# Patient Record
Sex: FEMALE | Race: BLACK OR AFRICAN AMERICAN | NOT HISPANIC OR LATINO | Employment: FULL TIME | URBAN - METROPOLITAN AREA
[De-identification: names, ages, dates, MRNs, and addresses within clinical notes are randomized per-mention and may not be internally consistent; named-entity substitution may affect disease eponyms.]

---

## 2017-02-27 ENCOUNTER — HOSPITAL ENCOUNTER (EMERGENCY)
Facility: HOSPITAL | Age: 22
Discharge: HOME | End: 2017-02-27
Attending: EMERGENCY MEDICINE
Payer: MEDICAID

## 2017-02-27 VITALS
TEMPERATURE: 98 F | BODY MASS INDEX: 24.75 KG/M2 | DIASTOLIC BLOOD PRESSURE: 79 MMHG | SYSTOLIC BLOOD PRESSURE: 122 MMHG | RESPIRATION RATE: 18 BRPM | HEIGHT: 64 IN | WEIGHT: 145 LBS | HEART RATE: 71 BPM | OXYGEN SATURATION: 98 %

## 2017-02-27 DIAGNOSIS — N12 PYELONEPHRITIS: Primary | ICD-10-CM

## 2017-02-27 LAB
ANION GAP SERPL CALC-SCNC: 9 MMOL/L
B-HCG UR QL: NEGATIVE
BASOPHILS # BLD AUTO: 0.08 K/UL
BASOPHILS NFR BLD: 1.3 %
BILIRUB UR QL STRIP: NEGATIVE
BUN SERPL-MCNC: 10 MG/DL
CALCIUM SERPL-MCNC: 9.6 MG/DL
CHLORIDE SERPL-SCNC: 106 MMOL/L
CLARITY UR: ABNORMAL
CO2 SERPL-SCNC: 22 MMOL/L
COLOR UR: YELLOW
CREAT SERPL-MCNC: 0.8 MG/DL
CTP QC/QA: YES
DIFFERENTIAL METHOD: ABNORMAL
EOSINOPHIL # BLD AUTO: 0.2 K/UL
EOSINOPHIL NFR BLD: 3.5 %
ERYTHROCYTE [DISTWIDTH] IN BLOOD BY AUTOMATED COUNT: 12.5 %
EST. GFR  (AFRICAN AMERICAN): >60 ML/MIN/1.73 M^2
EST. GFR  (NON AFRICAN AMERICAN): >60 ML/MIN/1.73 M^2
GLUCOSE SERPL-MCNC: 89 MG/DL
GLUCOSE UR QL STRIP: NEGATIVE
GRAM STN SPEC: NORMAL
GRAM STN SPEC: NORMAL
HCT VFR BLD AUTO: 47.2 %
HGB BLD-MCNC: 16.2 G/DL
HGB UR QL STRIP: ABNORMAL
KETONES UR QL STRIP: NEGATIVE
LEUKOCYTE ESTERASE UR QL STRIP: NEGATIVE
LYMPHOCYTES # BLD AUTO: 1.8 K/UL
LYMPHOCYTES NFR BLD: 29.5 %
MCH RBC QN AUTO: 32.7 PG
MCHC RBC AUTO-ENTMCNC: 34.3 %
MCV RBC AUTO: 95 FL
MICROSCOPIC COMMENT: NORMAL
MONOCYTES # BLD AUTO: 0.6 K/UL
MONOCYTES NFR BLD: 9.2 %
NEUTROPHILS # BLD AUTO: 3.4 K/UL
NEUTROPHILS NFR BLD: 56.5 %
NITRITE UR QL STRIP: NEGATIVE
PH UR STRIP: 5 [PH] (ref 5–8)
PLATELET # BLD AUTO: 299 K/UL
PMV BLD AUTO: 10.1 FL
POTASSIUM SERPL-SCNC: 4 MMOL/L
PROT UR QL STRIP: NEGATIVE
RBC # BLD AUTO: 4.96 M/UL
RBC #/AREA URNS HPF: 4 /HPF (ref 0–4)
SODIUM SERPL-SCNC: 137 MMOL/L
SP GR UR STRIP: 1.02 (ref 1–1.03)
URN SPEC COLLECT METH UR: ABNORMAL
UROBILINOGEN UR STRIP-ACNC: NEGATIVE EU/DL
WBC # BLD AUTO: 6.01 K/UL

## 2017-02-27 PROCEDURE — 85025 COMPLETE CBC W/AUTO DIFF WBC: CPT

## 2017-02-27 PROCEDURE — 80048 BASIC METABOLIC PNL TOTAL CA: CPT

## 2017-02-27 PROCEDURE — 81000 URINALYSIS NONAUTO W/SCOPE: CPT

## 2017-02-27 PROCEDURE — 87086 URINE CULTURE/COLONY COUNT: CPT

## 2017-02-27 PROCEDURE — 99284 EMERGENCY DEPT VISIT MOD MDM: CPT | Mod: 25

## 2017-02-27 PROCEDURE — 96374 THER/PROPH/DIAG INJ IV PUSH: CPT

## 2017-02-27 PROCEDURE — 87205 SMEAR GRAM STAIN: CPT

## 2017-02-27 PROCEDURE — 63600175 PHARM REV CODE 636 W HCPCS: Performed by: EMERGENCY MEDICINE

## 2017-02-27 PROCEDURE — 81025 URINE PREGNANCY TEST: CPT | Performed by: EMERGENCY MEDICINE

## 2017-02-27 RX ORDER — ACETAMINOPHEN 500 MG
1000 TABLET ORAL 3 TIMES DAILY PRN
Qty: 30 TABLET | Refills: 0
Start: 2017-02-27 | End: 2020-08-05

## 2017-02-27 RX ORDER — ONDANSETRON 4 MG/1
4 TABLET, FILM COATED ORAL 3 TIMES DAILY PRN
Qty: 20 TABLET | Refills: 0 | Status: SHIPPED | OUTPATIENT
Start: 2017-02-27 | End: 2017-08-07

## 2017-02-27 RX ORDER — KETOROLAC TROMETHAMINE 30 MG/ML
10 INJECTION, SOLUTION INTRAMUSCULAR; INTRAVENOUS
Status: COMPLETED | OUTPATIENT
Start: 2017-02-27 | End: 2017-02-27

## 2017-02-27 RX ORDER — METRONIDAZOLE 500 MG/1
500 TABLET ORAL 3 TIMES DAILY
COMMUNITY
End: 2017-08-07

## 2017-02-27 RX ORDER — AMOXICILLIN AND CLAVULANATE POTASSIUM 875; 125 MG/1; MG/1
1 TABLET, FILM COATED ORAL EVERY 12 HOURS
Qty: 28 TABLET | Refills: 0 | Status: SHIPPED | OUTPATIENT
Start: 2017-02-27 | End: 2017-03-09

## 2017-02-27 RX ORDER — KETOROLAC TROMETHAMINE 10 MG/1
10 TABLET, FILM COATED ORAL EVERY 12 HOURS PRN
Qty: 6 TABLET | Refills: 0 | Status: SHIPPED | OUTPATIENT
Start: 2017-02-27 | End: 2017-03-02

## 2017-02-27 RX ADMIN — KETOROLAC TROMETHAMINE 10 MG: 30 INJECTION, SOLUTION INTRAMUSCULAR at 11:02

## 2017-02-27 NOTE — ED NOTES
Pt reports that she drove herself to the ER but she needs someone to assist her with providing urine sample. Explained that due to the high volume of patients being triaged right now I am unable to assist her with getting on/off the toilet but if she gets the urge to provide sample, she can alert me and I will get assistance. Pt verbalized understanding.

## 2017-02-27 NOTE — ED AVS SNAPSHOT
[facility]  [address] [address]               Heydi SANDOVAL Czaplinski   2017 10:07 AM   ED    Description:  Female : 1995   Department:  [facility]           Your Care was Coordinated By:     Provider Role From To    Antonio Armenta MD Attending Provider 17 1008 --      Reason for Visit     Flank Pain           Diagnoses this Visit        Comments    Pyelonephritis    -  Primary       ED Disposition     ED Disposition Condition Comment    Discharge             To Do List           Follow-up Information     Follow up with ROCÍO Castro MD.    Specialty:  Urology    Why:  Recurrent UTI    Contact information:    [address] [facility]  [address]  [place] [address]  [phone]          Follow up with [facility].    Specialty:  Emergency Medicine    Why:  As needed, If symptoms worsen    Contact information:    [address] [place] [id number] [phone]       These Medications        Disp Refills Start End    ondansetron (ZOFRAN) 4 MG tablet 20 tablet 0 2017     Take 1 tablet (4 mg total) by mouth 3 (three) times daily as needed for Nausea. - Oral    acetaminophen (TYLENOL) 500 MG tablet 30 tablet 0 2017     Take 2 tablets (1,000 mg total) by mouth 3 (three) times daily as needed for Pain. - Oral    amoxicillin-clavulanate 875-125mg (AUGMENTIN) 875-125 mg per tablet 28 tablet 0 2017 3/9/2017    Take 1 tablet by mouth every 12 (twelve) hours. - Oral    ketorolac (TORADOL) 10 mg tablet 6 tablet 0 2017 3/2/2017    Take 1 tablet (10 mg total) by mouth every 12 (twelve) hours as needed for Pain. - Oral      [facility]     [facility] -  Assistance  Registered nurses in the [facility] provide clinical advisement, health education, appointment booking, and other advisory services.  Call for this free service at [phone].              Medications           Message regarding Medications     Verify the changes and/or additions to your medication regime listed below are the same as discussed with your clinician today.  If any of these changes or additions are incorrect, please notify your healthcare provider.        START taking these NEW medications        Refills    ondansetron (ZOFRAN) 4 MG tablet 0    Sig: Take 1 tablet (4 mg total) by mouth 3 (three) times daily as needed for Nausea.    Class: Print    Route: Oral    acetaminophen (TYLENOL) 500 MG tablet 0    Sig: Take 2 tablets (1,000 mg total) by mouth 3 (three) times daily as needed for Pain.    Class: No Print    Route: Oral    amoxicillin-clavulanate 875-125mg (AUGMENTIN) 875-125 mg per tablet 0    Sig: Take 1 tablet by mouth every 12 (twelve) hours.    Class: Print    Route: Oral    ketorolac (TORADOL) 10 mg tablet 0    Sig: Take 1 tablet (10 mg total) by mouth every 12 (twelve) hours as needed for Pain.    Class: Print    Route: Oral      These medications were administered today        Dose Freq    ketorolac injection 10 mg 10 mg ED 1 Time    Sig: Inject 10 mg into the vein ED 1 Time.    Class: Normal    Route: Intravenous           Verify that the below list of medications is an accurate representation of the medications you are currently taking.  If none reported, the list may be blank. If incorrect, please contact your healthcare provider. Carry this list with you in case of emergency.           Current Medications     metronidazole (FLAGYL) 500 MG tablet Take 500 mg by mouth 3 (three) times daily.    acetaminophen (TYLENOL) 500 MG tablet Take 2 tablets (1,000 mg total) by mouth 3 (three) times daily as needed for Pain.    amoxicillin-clavulanate 875-125mg (AUGMENTIN) 875-125 mg per tablet Take 1 tablet by mouth every 12 (twelve) hours.    ketorolac (TORADOL) 10 mg tablet Take 1 tablet (10 mg total) by mouth every 12 (twelve) hours as needed for Pain.    ondansetron  "(ZOFRAN) 4 MG tablet Take 1 tablet (4 mg total) by mouth 3 (three) times daily as needed for Nausea.           Clinical Reference Information           Your Vitals Were     BP                   126/82 (BP Location: Right arm, Patient Position: Sitting, BP Method: Automatic)           Allergies as of 2/27/2017        Reactions    Ciprofloxacin     Patient stated that when taking this medication she burns and itches and upon getting it via IV she can see the redness travel up her arm.      Immunizations Administered on Date of Encounter - 2/27/2017     None      ED Micro, Lab, POCT     Start Ordered       Status Ordering Provider    02/27/17 1243 02/27/17 1243  Urine culture  STAT      Ordered     02/27/17 1013 02/27/17 1012  CBC auto differential  Once      Final result     02/27/17 1013 02/27/17 1012  Basic metabolic panel  STAT      Final result     02/27/17 1013 02/27/17 1012  Gram stain  STAT      Final result     02/27/17 1005 02/27/17 1004  POCT urine pregnancy  Once      Final result     02/27/17 1005 02/27/17 1004  Urinalysis  Once      Final result     02/27/17 1004 02/27/17 1004  Urinalysis Microscopic  Once      Final result       ED Imaging Orders     None        Discharge Instructions           Kidney Infection (Adult, Female)    An infection in one or both kidneys is called "pyelonephritis." It usually happens when bacteria (or rarely, viruses, fungi, or other disease-causing organisms) get into the kidney. The bacteria (or other disease-causing organisms) can enter the kidneys from the bladder or blood traveling from other parts of the body. A kidney infection can become serious. It can cause severe illness, scarring of the kidneys, or kidney failure if not treated properly.  Common causes for this problem include:  · Not keeping the genital area clean and dry, which promotes the growth of bacteria  · Wiping back to front which drags bacteria from the rectum toward the urinary opening " (urethra)  · Wearing tight pants or underwear (this lets moisture build up in the genital area, which helps bacteria grow)  · Holding urine in for long periods of time  · Dehydration  Kidney infections can cause symptoms similar to a bladder infection. Symptoms include:  · Pain (or burning) when urinating  · Having to urinate more often than usual  · Blood in the urine (pink or red)  · Abdominal pain or discomfort, usually in the lower abdomen  · Pain in the side or back  · Pain above the pubic bone  · Fever or chills  · Vomiting  · Loss of appetite  Treatment is oral antibiotics, or in more severe cases, intramuscular or IV antibiotics. These are started right away and may be changed once urine culture results determine the infecting organisms. Treatment helps prevent a more serious kidney infection.  Medicines  Medicines can help in the treatment of a bladder infection:  · Take antibiotics until they are used up, even if you feel better. It is important to finish them to make sure the infection is gone.  · Unless another medicine was prescribed, you can use over-the-counter medicines for pain, fever, or discomfort. If you have chronic liver of kidney disease, talk with your healthcare provider before using these medicines. Also talk with your provider if you've ever had a stomach ulcer or gastrointestinal (GI) bleeding, or are taking blood thinners.  Home care  The following are general care guidelines:  · Stay home from work or school. Rest in bed until your fever breaks and you are feeling better, or as advised by your healthcare provider.  · Drink lots of fluid unless you must restrict fluids for other medical reasons. This will force the medicine into your urinary system and flush the bacteria out of your body. Ask your healthcare provider how much you should drink.  · Avoid sex until you have finished all of your medicine and your symptoms are gone.  · Avoid caffeine, alcohol, and spicy foods. These foods may  irritate the kidneys and bladder.  · Avoid taking bubble baths. Sensitivity to the chemicals in bubble baths can irritate the urethra.  · Make sure you wipe from front to back after using the toilet.  · Wear loose cloths and cotton underwear.  Prevention  These self-care steps can help prevent future infections:  · Drink plenty of fluids to prevent dehydration and flush out the bladder. Do this unless you must restrict fluids for other health reasons, or your healthcare provider told you not to.  · Proper cleaning after going to the bathroom in important. Make sure you wipe from front to back after using the toilet.  · Urinate more often. Don't try to hold urine in for a long time.  · Avoid tight-fitting pants and underwear.  · Improve your diet to prevent constipation. Eat more fruits, vegetables, and fiber. Eat less junk and fatty foods. Constipation can make a urinary tract infection more likely. Talk with your healthcare provider if you have trouble with bowel movements.  · Urinate right after intercourse to flush out the bladder.  Follow-up care  Follow up with your healthcare provider, or as advised. Additional testing may be needed to make sure the infection has cleared. Close follow-up and further testing is very important to find the cause and to prevent future infections.  If a urine culture was done, you will be contacted if your treatment needs to be changed. If directed, you may call to find out the results.  If you had an X-ray, CT scan, or other diagnostic test, you will be notified of any new findings that may affect your care.  Call 911  Call 911 if any of the following occur:  · Trouble breathing  · Fainting or loss of consciousness  · Rapid or very slow heart rate  · Weakness, dizziness, or fainting  · Difficulty arousing or confusion  When to seek medical advice  Call your healthcare provider right away if any of these occur:  · Fever 100.4°F (38°C) or higher after 48 hours of treatment, or as  advised by your healthcare provider  · Not feeling better within 1 to 2 days after starting antibiotics  · Any symptom that continues after 3 days of treatment  · Increasing pain in the stomach, back, side, or groin area  · Repeated vomiting  · Not able to take prescribed medicine due to nausea or another reason  · Bloody, dark-colored, or foul smelling urine  · Trouble urinating or decreased urine output  · No urine for 8 hours, no tears when crying, sunken eyes, or dry mouth  Date Last Reviewed: 10/1/2016  © 1296-5164 [employer]. [address] [place] [address]. All rights reserved. This information is not intended as a substitute for professional medical care. Always follow your healthcare professional's instructions.          [facility] Sign-Up     Activating your [facility] account is as easy as 1-2-3!     1) Visit [email or web address], select Sign Up Now, enter this activation code and your date of birth, then select Next.  [email or web address]  Expires: 4/13/2017 12:44 PM      2) Create a username and password to use when you visit [facility] in the future and select a security question in case you lose your password and select Next.    3) Enter your e-mail address and click Sign Up!    Additional Information  If you have questions, please e-mail [email or web address] or call [phone] to talk to our [facility] staff. Remember, [facility] is NOT to be used for urgent needs. For medical emergencies, dial 911.         Smoking Cessation     If you would like to quit smoking:   You may be eligible for free services if you are a [place] resident and started smoking cigarettes before September 1, 1988.  Call the [employer]oking [employer]ess[employer] ([employer]) toll free at (285) 116-3934 or (293) 052-1134.   Call 6-331-QUIT-NOW if you do not meet the above criteria.             [facility] complies with applicable Federal civil rights laws and does not discriminate on the basis of race, color,  national origin, age, disability, or sex.        Language Assistance Services     ATTENTION: Language assistance services are available, free of charge. Please call [phone].      ATENCIÓN: Si habla kurtis, tiene a pitt disposición servicios gratuitos de asistencia lingüística. Llame al [phone].     CHÚ Ý: N?u b?n nói Ti?ng Vi?t, có các d?ch v? h? tr? ngôn ng? mi?n phí dành cho b?n. G?i s? [phone].

## 2017-02-27 NOTE — ED PROVIDER NOTES
Encounter Date: 2/27/2017       History     Chief Complaint   Patient presents with    Flank Pain     left side X 5 days     Review of patient's allergies indicates:   Allergen Reactions    Ciprofloxacin      Patient stated that when taking this medication she burns and itches and upon getting it via IV she can see the redness travel up her arm.     HPI Comments: Pt presents with bilateral flank pain. Pain is sharp. Left worse than right. Left sided pain started 5 days ago.  Right-sided pain started yesterday.  States this is similar presentation when she was diagnosed with a recently passed kidney stone and a urinary tract infection.  She denies any fever or chills or vomiting.    The history is provided by the patient.     Past Medical History:   Diagnosis Date    Gastroenteritis     Kidney stone     Ovarian cyst 10/21/2013    PID (pelvic inflammatory disease) 10/21/2013     Past Surgical History:   Procedure Laterality Date    NONE  10/21/2013     Family History   Problem Relation Age of Onset    Diabetes Neg Hx     Hypertension Neg Hx     Stroke Neg Hx     Heart disease Neg Hx      Social History   Substance Use Topics    Smoking status: Current Every Day Smoker     Packs/day: 0.50     Types: Cigarettes    Smokeless tobacco: None    Alcohol use Yes     Review of Systems   Constitutional: Negative for fever.   HENT: Negative for sore throat.    Respiratory: Negative for shortness of breath.    Cardiovascular: Negative for chest pain.   Gastrointestinal: Negative for nausea.   Genitourinary: Positive for flank pain, frequency, hematuria and urgency. Negative for decreased urine volume, dysuria, menstrual problem, pelvic pain, vaginal bleeding and vaginal discharge.   Musculoskeletal: Negative for back pain.   Skin: Negative for rash.   Neurological: Negative for weakness.   Hematological: Does not bruise/bleed easily.       Physical Exam   Initial Vitals   BP Pulse Resp Temp SpO2   02/27/17 1002  02/27/17 1002 02/27/17 1002 02/27/17 1002 02/27/17 1002   118/81 87 17 98.2 °F (36.8 °C) 97 %     Physical Exam    Vitals reviewed.  Constitutional: She appears well-developed and well-nourished.   HENT:   Head: Normocephalic and atraumatic.   Nose: Nose normal.   Mouth/Throat: No oropharyngeal exudate.   Eyes: EOM are normal. Pupils are equal, round, and reactive to light.   Neck: Normal range of motion. Neck supple. No JVD present.   Cardiovascular: Regular rhythm and normal heart sounds. Exam reveals no gallop and no friction rub.    No murmur heard.  Pulmonary/Chest: Breath sounds normal. No stridor. No respiratory distress. She has no wheezes. She has no rhonchi. She has no rales. She exhibits no tenderness.   Abdominal: Soft. Bowel sounds are normal. She exhibits no distension and no mass. There is no tenderness. There is CVA tenderness (Tenderness to percussion of the left CVA.  Right-sided does not elicit pain response). There is no rebound and no guarding.   Musculoskeletal: Normal range of motion. She exhibits no edema or tenderness.   Neurological: She is alert and oriented to person, place, and time. She has normal strength. No sensory deficit.   Skin: Skin is warm and dry.   Psychiatric: She has a normal mood and affect. Thought content normal.         ED Course   Procedures  Labs Reviewed   URINALYSIS - Abnormal; Notable for the following:        Result Value    Appearance, UA Hazy (*)     Occult Blood UA 1+ (*)     All other components within normal limits   CBC W/ AUTO DIFFERENTIAL - Abnormal; Notable for the following:     Hemoglobin 16.2 (*)     MCH 32.7 (*)     All other components within normal limits   BASIC METABOLIC PANEL - Abnormal; Notable for the following:     CO2 22 (*)     All other components within normal limits   GRAM STAIN   CULTURE, URINE   URINALYSIS MICROSCOPIC   POCT URINE PREGNANCY             Medical Decision Making:   History:   Old Medical Records: I decided to obtain old  medical records.  Old Records Summarized: records from previous admission(s).       <> Summary of Records: December 4, 2016 the patient was diagnosed with pyelonephritis with possibility of recently passed ureteral stone on CT scan.  Differential Diagnosis:   UTI, pyelonephritis, urosepsis, ureteral stones  Clinical Tests:   Lab Tests: Reviewed and Ordered  Medical decision-making:    The patient received a medical screening exam. If performed, the EKG was independently evaluated by me and is pending final cardiology evaluation.  If performed, all radiographic studies were independently evaluated by me and are pending final radiology evaluation. If labs were ordered, they were reviewed. Vital signs are independently assessed by me.  If performed, the pulse oximetry was independently evaluated by me.  I decided to obtain the patient's past medical record.  If available, I reviewed the patient's past medical record, including most recent labs and radiology reports.    Vital sign review shows the patient is afebrile with a normal pulse and respiratory rate.  Patient's blood pressure is within a normal range.  Patient's pulse ox shows that the patient is oxygenating appropriately and is above 95% on Room Air.     Pt with flank pain and urinary symptoms. UA shows no infection, but gram stain is positive for GNR. Last UC showed a susceptibility to Augmentin.  Will recent urine culture and start Augmentin.  Patient is afebrile and without leukocytosis.  I do not think she is septic.  Will treat the patient for pyelonephritis.  Supportive care and referral to urology for recurrent urinary tract infections.    The results and physical exam findings were reviewed with the patient. Pt agrees with assessment, disposition and treatment plan and has no further questions or complaints at this time.    YANI Armenta M.D. 12:48 PM 2/27/2017                     ED Course     Clinical Impression:   The encounter diagnosis was  Pyelonephritis.          Antonio Armenta MD  02/27/17 1665

## 2017-03-01 LAB — BACTERIA UR CULT: NORMAL

## 2017-08-07 ENCOUNTER — HOSPITAL ENCOUNTER (EMERGENCY)
Facility: HOSPITAL | Age: 22
Discharge: HOME | End: 2017-08-07
Payer: MEDICAID

## 2017-08-07 VITALS
WEIGHT: 148 LBS | OXYGEN SATURATION: 96 % | HEART RATE: 124 BPM | TEMPERATURE: 99 F | DIASTOLIC BLOOD PRESSURE: 81 MMHG | SYSTOLIC BLOOD PRESSURE: 142 MMHG | HEIGHT: 64 IN | BODY MASS INDEX: 25.27 KG/M2 | RESPIRATION RATE: 20 BRPM

## 2017-08-07 DIAGNOSIS — R50.9 FEVER: ICD-10-CM

## 2017-08-07 DIAGNOSIS — J02.9 PHARYNGITIS, UNSPECIFIED ETIOLOGY: Primary | ICD-10-CM

## 2017-08-07 LAB
ALBUMIN SERPL BCP-MCNC: 3.9 G/DL
ALP SERPL-CCNC: 90 U/L
ALT SERPL W/O P-5'-P-CCNC: 11 U/L
ANION GAP SERPL CALC-SCNC: 7 MMOL/L
AST SERPL-CCNC: 16 U/L
B-HCG UR QL: NEGATIVE
BACTERIA #/AREA URNS HPF: ABNORMAL /HPF
BASOPHILS # BLD AUTO: 0.04 K/UL
BASOPHILS NFR BLD: 0.3 %
BILIRUB SERPL-MCNC: 0.6 MG/DL
BILIRUB UR QL STRIP: NEGATIVE
BUN SERPL-MCNC: 7 MG/DL
CALCIUM SERPL-MCNC: 9.6 MG/DL
CHLORIDE SERPL-SCNC: 105 MMOL/L
CLARITY UR: CLEAR
CO2 SERPL-SCNC: 25 MMOL/L
COLOR UR: ABNORMAL
CREAT SERPL-MCNC: 0.8 MG/DL
CTP QC/QA: YES
DEPRECATED S PYO AG THROAT QL EIA: NEGATIVE
DIFFERENTIAL METHOD: ABNORMAL
EOSINOPHIL # BLD AUTO: 0.1 K/UL
EOSINOPHIL NFR BLD: 0.4 %
ERYTHROCYTE [DISTWIDTH] IN BLOOD BY AUTOMATED COUNT: 11.7 %
EST. GFR  (AFRICAN AMERICAN): >60 ML/MIN/1.73 M^2
EST. GFR  (NON AFRICAN AMERICAN): >60 ML/MIN/1.73 M^2
GLUCOSE SERPL-MCNC: 92 MG/DL
GLUCOSE UR QL STRIP: NEGATIVE
HCT VFR BLD AUTO: 47.1 %
HGB BLD-MCNC: 15.8 G/DL
HGB UR QL STRIP: ABNORMAL
KETONES UR QL STRIP: NEGATIVE
LEUKOCYTE ESTERASE UR QL STRIP: NEGATIVE
LYMPHOCYTES # BLD AUTO: 0.7 K/UL
LYMPHOCYTES NFR BLD: 5.1 %
MCH RBC QN AUTO: 32.3 PG
MCHC RBC AUTO-ENTMCNC: 33.5 G/DL
MCV RBC AUTO: 96 FL
MICROSCOPIC COMMENT: ABNORMAL
MONOCYTES # BLD AUTO: 1 K/UL
MONOCYTES NFR BLD: 6.9 %
NEUTROPHILS # BLD AUTO: 12.8 K/UL
NEUTROPHILS NFR BLD: 87.1 %
NITRITE UR QL STRIP: NEGATIVE
PH UR STRIP: 8 [PH] (ref 5–8)
PLATELET # BLD AUTO: 244 K/UL
PMV BLD AUTO: 9.9 FL
POTASSIUM SERPL-SCNC: 4.2 MMOL/L
PROT SERPL-MCNC: 7.9 G/DL
PROT UR QL STRIP: NEGATIVE
RBC # BLD AUTO: 4.89 M/UL
RBC #/AREA URNS HPF: 10 /HPF (ref 0–4)
SODIUM SERPL-SCNC: 137 MMOL/L
SP GR UR STRIP: 1.01 (ref 1–1.03)
SQUAMOUS #/AREA URNS HPF: 3 /HPF
URN SPEC COLLECT METH UR: ABNORMAL
UROBILINOGEN UR STRIP-ACNC: NEGATIVE EU/DL
WBC # BLD AUTO: 14.65 K/UL
WBC #/AREA URNS HPF: 3 /HPF (ref 0–5)

## 2017-08-07 PROCEDURE — 85025 COMPLETE CBC W/AUTO DIFF WBC: CPT

## 2017-08-07 PROCEDURE — 99284 EMERGENCY DEPT VISIT MOD MDM: CPT | Mod: 25

## 2017-08-07 PROCEDURE — 96360 HYDRATION IV INFUSION INIT: CPT

## 2017-08-07 PROCEDURE — 87040 BLOOD CULTURE FOR BACTERIA: CPT | Mod: 59

## 2017-08-07 PROCEDURE — 25000003 PHARM REV CODE 250: Performed by: NURSE PRACTITIONER

## 2017-08-07 PROCEDURE — 80053 COMPREHEN METABOLIC PANEL: CPT

## 2017-08-07 PROCEDURE — 87081 CULTURE SCREEN ONLY: CPT

## 2017-08-07 PROCEDURE — 81025 URINE PREGNANCY TEST: CPT

## 2017-08-07 PROCEDURE — 81000 URINALYSIS NONAUTO W/SCOPE: CPT

## 2017-08-07 PROCEDURE — 63600175 PHARM REV CODE 636 W HCPCS: Performed by: NURSE PRACTITIONER

## 2017-08-07 PROCEDURE — 87880 STREP A ASSAY W/OPTIC: CPT

## 2017-08-07 PROCEDURE — 96372 THER/PROPH/DIAG INJ SC/IM: CPT

## 2017-08-07 RX ORDER — IBUPROFEN 200 MG
200 TABLET ORAL EVERY 6 HOURS PRN
Status: ON HOLD | COMMUNITY
End: 2018-05-09 | Stop reason: HOSPADM

## 2017-08-07 RX ORDER — ACETAMINOPHEN 325 MG/1
650 TABLET ORAL
Status: COMPLETED | OUTPATIENT
Start: 2017-08-07 | End: 2017-08-07

## 2017-08-07 RX ADMIN — SODIUM CHLORIDE 1000 ML: 0.9 INJECTION, SOLUTION INTRAVENOUS at 11:08

## 2017-08-07 RX ADMIN — PENICILLIN G BENZATHINE 1.2 MILLION UNITS: 1200000 INJECTION, SUSPENSION INTRAMUSCULAR at 03:08

## 2017-08-07 RX ADMIN — ACETAMINOPHEN 650 MG: 325 TABLET, FILM COATED ORAL at 11:08

## 2017-08-07 NOTE — ED PROVIDER NOTES
Encounter Date: 8/7/2017       History     Chief Complaint   Patient presents with    Generalized Body Aches     generalized body aches; fever 103.2;symptoms x  4 days; nausea today     Chief complaint fever    History of present illness: Patient is a 22-year-old female who presents with 4 days of back pain that is intermittent and shooting she's tried ibuprofen and Tylenol states that the pain radiates by shocking to all extremities and is currently at 10 over 10.  Her fever has been as high as 103.2 accompanied by chills and decreased appetite she reports that she feels congested and has runny nose due to crying also has some ear pain and some nausea denies urinary changes including frequency urgency hematuria and negative vaginal bleeding or vaginal discharge negative abdominal pain.    The history is provided by the patient. No  was used.     Review of patient's allergies indicates:   Allergen Reactions    Ciprofloxacin      Patient stated that when taking this medication she burns and itches and upon getting it via IV she can see the redness travel up her arm.     Past Medical History:   Diagnosis Date    Gastroenteritis     Kidney infection     Kidney stone     Ovarian cyst 10/21/2013    PID (pelvic inflammatory disease) 10/21/2013     Past Surgical History:   Procedure Laterality Date    MOUTH SURGERY      NONE  10/21/2013     Family History   Problem Relation Age of Onset    Diabetes Neg Hx     Hypertension Neg Hx     Stroke Neg Hx     Heart disease Neg Hx      Social History   Substance Use Topics    Smoking status: Current Every Day Smoker     Packs/day: 0.50     Types: Cigarettes    Smokeless tobacco: Never Used    Alcohol use Yes     Review of Systems   Constitutional: Positive for appetite change (decreased), chills and fever (Fpyp=965.2F). Negative for fatigue.   HENT: Positive for congestion, ear pain and rhinorrhea. Negative for ear discharge, postnasal drip, sinus  pressure, sneezing, sore throat and voice change.    Eyes: Negative for discharge and itching.   Respiratory: Negative for cough, shortness of breath and wheezing.    Cardiovascular: Negative for chest pain, palpitations and leg swelling.   Gastrointestinal: Positive for nausea. Negative for abdominal pain, constipation, diarrhea and vomiting.   Endocrine: Negative for polydipsia, polyphagia and polyuria.   Genitourinary: Negative for dysuria, frequency, hematuria, urgency, vaginal bleeding, vaginal discharge and vaginal pain.   Musculoskeletal: Negative for arthralgias and myalgias.   Skin: Negative for rash and wound.   Neurological: Negative for dizziness, seizures, syncope, weakness and numbness.   Hematological: Negative for adenopathy. Does not bruise/bleed easily.   Psychiatric/Behavioral: Negative for self-injury and suicidal ideas. The patient is not nervous/anxious.        Physical Exam     Initial Vitals [08/07/17 1057]   BP Pulse Resp Temp SpO2   124/73 (!) 115 20 (!) 100.7 °F (38.2 °C) 100 %      MAP       90         Physical Exam    Nursing note and vitals reviewed.  Constitutional: She appears well-developed and well-nourished.   HENT:   Head: Normocephalic and atraumatic.   Right Ear: External ear normal.   Left Ear: External ear normal.   Nose: Nose normal. No epistaxis.   Mouth/Throat: Posterior oropharyngeal edema and posterior oropharyngeal erythema present.   Eyes: Conjunctivae and EOM are normal. Pupils are equal, round, and reactive to light. Right eye exhibits no discharge. Left eye exhibits no discharge.   Neck: Normal range of motion.   Cardiovascular: Regular rhythm, S1 normal, S2 normal and normal heart sounds. Exam reveals no gallop.    No murmur heard.  Pulmonary/Chest: Effort normal and breath sounds normal. No respiratory distress. She has no decreased breath sounds. She has no wheezes. She has no rhonchi. She has no rales.   Abdominal: Soft. Normal appearance and bowel sounds are  normal. She exhibits no distension. There is no tenderness.   Musculoskeletal: Normal range of motion.   Neurological: She is alert and oriented to person, place, and time.   Skin: Skin is dry. Capillary refill takes less than 2 seconds.         ED Course   Procedures  Labs Reviewed   CULTURE, BLOOD   CULTURE, BLOOD   URINALYSIS   CBC W/ AUTO DIFFERENTIAL   COMPREHENSIVE METABOLIC PANEL   POCT URINE PREGNANCY             Medical Decision Making:   Initial Assessment:   22 y.o. female presents for emergent evaluation of fever  Differential Diagnosis:   Febrile illness, viral illness, sepsis  ED Management:  Following a thorough history and physical, the patient had a thorough laboratory workup.  Pregnancy test was negative.  UA is negative for infection, no nitrites, leukocytes, or protein present.  UA was positive for occult blood but this is often positive in women with intact uteri.  Rapid strep test was negative.  CBC was positive for white blood cell count of 14.65 but otherwise negative for anemia with an H&H of 15.8 and 47.1 and platelets were 244 indicating a tendency towards bleeding. The chemistry was negative for hypo-or hyper natremia, kalemia, chloridemia, or other electrolyte abnormalities; BUN and creatinine were within normal limits indicating normal kidney function, ALT and AST were within normal limits indicating normal liver function, there was no transaminitis.  Chest x-ray is negative for cardiopulmonary abnormalities, or bony abnormalities.  Patient was discharged home following Bicillin LA 1.2 million units injection intramuscularly for presumed strep pharyngitis following independent examination by Dr. Rajendra Aguilar.  She was given instructions to Tylenol alternating with ibuprofen and understands that she can follow-up in the emergency department for any new or worrisome symptoms or failure to improve.  She said also follow-up with her primary care provider in a timely manner.    Care of the  patient discussed with Dr. Aguilar who agreed with the assessment and plan.                Attending Attestation:     Physician Attestation Statement for NP/PA:   I have conducted a face to face encounter with this patient in addition to the NP/PA, due to    Other NP/PA Attestation Additions:     Physical Exam: Patient was differential erythema and tender right anterior cervical node 1 x 2 cm no meningismus  Diffuse myalgias to rest, lumbar paraspinal musculature                  ED Course     Clinical Impression:   The primary encounter diagnosis was Pharyngitis, unspecified etiology. A diagnosis of Fever was also pertinent to this visit.    Disposition:   Disposition: Discharged  Condition: Stable                        Costa Eckert, [facility]  08/07/17 1536

## 2017-08-07 NOTE — DISCHARGE INSTRUCTIONS
Use cepacol roxanne for your sore throat, available over the counter. Alternate Tylenol and advil every 3 hours for fever/body aches    Return to the Emergency department for any worsening or failure to improve, otherwise follow up with your primary care provider.

## 2017-08-12 LAB
BACTERIA BLD CULT: NORMAL
BACTERIA BLD CULT: NORMAL
BACTERIA THROAT CULT: NORMAL

## 2018-05-03 ENCOUNTER — HOSPITAL ENCOUNTER (EMERGENCY)
Facility: HOSPITAL | Age: 23
Discharge: HOME | End: 2018-05-03
Attending: EMERGENCY MEDICINE
Payer: MEDICAID

## 2018-05-03 VITALS
HEART RATE: 79 BPM | RESPIRATION RATE: 18 BRPM | DIASTOLIC BLOOD PRESSURE: 86 MMHG | SYSTOLIC BLOOD PRESSURE: 137 MMHG | WEIGHT: 142 LBS | BODY MASS INDEX: 24.24 KG/M2 | TEMPERATURE: 99 F | OXYGEN SATURATION: 96 % | HEIGHT: 64 IN

## 2018-05-03 DIAGNOSIS — O36.80X0 PREGNANCY OF UNKNOWN ANATOMIC LOCATION: ICD-10-CM

## 2018-05-03 DIAGNOSIS — R10.9 LEFT FLANK PAIN: Primary | ICD-10-CM

## 2018-05-03 LAB — HCG INTACT+B SERPL-ACNC: 339 MIU/ML

## 2018-05-03 PROCEDURE — 84702 CHORIONIC GONADOTROPIN TEST: CPT

## 2018-05-03 PROCEDURE — 87086 URINE CULTURE/COLONY COUNT: CPT

## 2018-05-03 PROCEDURE — 99284 EMERGENCY DEPT VISIT MOD MDM: CPT | Mod: 25,27

## 2018-05-03 PROCEDURE — 25000003 PHARM REV CODE 250: Performed by: PHYSICIAN ASSISTANT

## 2018-05-03 PROCEDURE — 87077 CULTURE AEROBIC IDENTIFY: CPT

## 2018-05-03 PROCEDURE — 63600175 PHARM REV CODE 636 W HCPCS: Performed by: PHYSICIAN ASSISTANT

## 2018-05-03 PROCEDURE — 87186 SC STD MICRODIL/AGAR DIL: CPT

## 2018-05-03 PROCEDURE — 96372 THER/PROPH/DIAG INJ SC/IM: CPT

## 2018-05-03 PROCEDURE — 87088 URINE BACTERIA CULTURE: CPT

## 2018-05-03 RX ORDER — LIDOCAINE HYDROCHLORIDE 10 MG/ML
5 INJECTION INFILTRATION; PERINEURAL
Status: COMPLETED | OUTPATIENT
Start: 2018-05-03 | End: 2018-05-03

## 2018-05-03 RX ORDER — CEFTRIAXONE 1 G/1
1 INJECTION, POWDER, FOR SOLUTION INTRAMUSCULAR; INTRAVENOUS
Status: COMPLETED | OUTPATIENT
Start: 2018-05-03 | End: 2018-05-03

## 2018-05-03 RX ORDER — CEPHALEXIN 500 MG/1
1000 CAPSULE ORAL EVERY 8 HOURS
Qty: 84 CAPSULE | Refills: 0 | Status: SHIPPED | OUTPATIENT
Start: 2018-05-03 | End: 2018-05-17

## 2018-05-03 RX ADMIN — CEFTRIAXONE SODIUM 1 G: 1 INJECTION, POWDER, FOR SOLUTION INTRAMUSCULAR; INTRAVENOUS at 10:05

## 2018-05-03 RX ADMIN — LIDOCAINE HYDROCHLORIDE 5 ML: 10 INJECTION, SOLUTION INFILTRATION; PERINEURAL at 10:05

## 2018-05-03 NOTE — DISCHARGE INSTRUCTIONS
Take all antibiotics as prescribed. Drink lots of fluids, stay well hydrated. Establish care with a gynecologist for repeat hcg testing, for management of this pregnancy. Return to this ED if you begin with abdominal pain, if unable to tolerate by mouth intake, if you begin with fever, if any other problems occur.

## 2018-05-03 NOTE — ED NOTES
"Returned from u/s, reports she was informed by the [employer]/s tech that " there was nothing there" requesting repeat urine pregnancy and beta labs, made aware, provider will be made aware, provider Kirk made aware and pt placed in discharge room to speak to provider  "

## 2018-05-03 NOTE — ED PROVIDER NOTES
Encounter Date: 5/3/2018  23-year-old female complains of left flank pain. She was evaluated in the ED for same complaint this morning, also with positive UPT, but left ED before evaluation and treatment was complete.  The patient returning for same complaint.  Vital signs stable. She is alert. She will be seen in exam room 4 full evaluation and treatment.  Marc Feldman PA-C 10:01 a.m.      History   No chief complaint on file.    24yo female presents to ED complaining of left flank pain ×2-3 days.  She also admits to dysuria.  No fever.  No nausea or emesis.  No abdominal pain.  Patient was evaluated at this ED earlier this morning, however had to leave due to some family issue.  Presents to ED for conclusion of her workup.  No radiation of pain.  No alleviating factors.  Worsened with movement.  Flank pain is constant.          Review of patient's allergies indicates:   Allergen Reactions    Ciprofloxacin      Patient stated that when taking this medication she burns and itches and upon getting it via IV she can see the redness travel up her arm.     Past Medical History:   Diagnosis Date    Gastroenteritis     Kidney infection     Kidney stone     Ovarian cyst 10/21/2013    PID (pelvic inflammatory disease) 10/21/2013     Past Surgical History:   Procedure Laterality Date    MOUTH SURGERY      NONE  10/21/2013     Family History   Problem Relation Age of Onset    Diabetes Neg Hx     Hypertension Neg Hx     Stroke Neg Hx     Heart disease Neg Hx      Social History   Substance Use Topics    Smoking status: Current Every Day Smoker     Packs/day: 0.50     Types: Cigarettes    Smokeless tobacco: Never Used    Alcohol use Yes     Review of Systems   Constitutional: Negative for fever.   HENT: Negative for sore throat.    Eyes: Negative.    Respiratory: Negative for shortness of breath.    Cardiovascular: Negative for chest pain.   Gastrointestinal: Negative for abdominal pain, nausea and vomiting.         Left flank pain   Endocrine: Negative.    Genitourinary: Positive for dysuria.   Musculoskeletal: Negative for back pain, neck pain and neck stiffness.   Skin: Negative for rash.   Neurological: Negative for weakness and headaches.   Hematological: Does not bruise/bleed easily.   Psychiatric/Behavioral: Negative.    All other systems reviewed and are negative.      Physical Exam     Initial Vitals   BP Pulse Resp Temp SpO2   -- -- -- -- --      MAP       --         Physical Exam    Nursing note and vitals reviewed.  Constitutional: She appears well-developed and well-nourished. She is not diaphoretic. No distress.   HENT:   Head: Normocephalic and atraumatic.   Eyes: Conjunctivae and EOM are normal. Pupils are equal, round, and reactive to light.   Neck: Normal range of motion. Neck supple. No tracheal deviation present.   Cardiovascular: Intact distal pulses.   Pulmonary/Chest: Breath sounds normal. No stridor. No respiratory distress. She has no wheezes.   Abdominal: Soft. Bowel sounds are normal. She exhibits no distension and no mass. There is no tenderness. There is no rebound and no guarding.   L flank and CVA ttp. Negative ford's. No pain over McBurney's point.   Lymphadenopathy:     She has no cervical adenopathy.   Neurological: She is alert and oriented to person, place, and time.   Skin: Skin is warm and dry. Capillary refill takes less than 2 seconds.   Psychiatric: She has a normal mood and affect. Her behavior is normal. Judgment and thought content normal.       ED Course   Procedures  Labs Reviewed   CULTURE, URINE   HCG, QUANTITATIVE, PREGNANCY            Medical Decision Making:   ED Management:  23-year-old female chief complaint dysuria and left flank pain.  History of nephrolithiasis and pyelonephritis.  No fever.  No nausea or emesis.  No abdominal pain.  No vaginal complaints.  LMP earlier last month.  UPT positive.  Differential this time includes nephrolithiasis, pyelonephritis, UTI.   Overall well-appearing nontoxic.  Vitals reassuring.  Left flank and CVA tenderness on exam.  Pelvic ultrasound to rule out ectopic.  Renal ultrasound rule out obvious kidney abnormality.    Renal u/s with mild hydronephrosis bilaterally. Given urinalysis, flank pain, will treat as pyelonephritis. She is afebrile without significant comorbidity. I do think we can manage this as an outpatient. Pelvic U/S without obvious pregnancy; pregnancy of unknown location. Will get hcg and have pt f/u with OB for repeat testing. She does understand and agree with this treatment plan. Tylenol as needed for discomfort. Return precautions given.  Other:   I have discussed this case with another health care provider.              Attending Attestation:     Physician Attestation Statement for NP/PA:   I discussed this assessment and plan of this patient with the NP/PA, but I did not personally examine the patient. The face to face encounter was performed by the NP/PA.                     Clinical Impression:   The primary encounter diagnosis was Left flank pain. A diagnosis of Pregnancy of unknown anatomic location was also pertinent to this visit.    Disposition:   Disposition: Discharged  Condition: Stable                        Kirk Jones PA-C  05/03/18 1501       Zaheer Crawford MD  05/03/18 1544

## 2018-05-05 LAB — BACTERIA UR CULT: NORMAL

## 2018-05-08 ENCOUNTER — HOSPITAL ENCOUNTER (OUTPATIENT)
Facility: OTHER | Age: 23
Discharge: HOME | End: 2018-05-09
Attending: EMERGENCY MEDICINE | Admitting: OBSTETRICS & GYNECOLOGY
Payer: MEDICAID

## 2018-05-08 DIAGNOSIS — N93.9 VAGINAL BLEEDING: ICD-10-CM

## 2018-05-08 DIAGNOSIS — O36.80X0 PREGNANCY OF UNKNOWN ANATOMIC LOCATION: ICD-10-CM

## 2018-05-08 DIAGNOSIS — O00.202 LEFT OVARIAN PREGNANCY WITHOUT INTRAUTERINE PREGNANCY: Primary | ICD-10-CM

## 2018-05-08 PROBLEM — Z87.42 HISTORY OF ACUTE PID: Status: ACTIVE | Noted: 2018-05-08

## 2018-05-08 LAB
ANION GAP SERPL CALC-SCNC: 8 MMOL/L
B-HCG UR QL: POSITIVE
BASOPHILS # BLD AUTO: 0.1 K/UL
BASOPHILS NFR BLD: 0.8 %
BILIRUB UR QL STRIP: NEGATIVE
BUN SERPL-MCNC: 8 MG/DL
CALCIUM SERPL-MCNC: 9.9 MG/DL
CHLORIDE SERPL-SCNC: 104 MMOL/L
CLARITY UR REFRACT.AUTO: ABNORMAL
CO2 SERPL-SCNC: 25 MMOL/L
COLOR UR AUTO: YELLOW
CREAT SERPL-MCNC: 0.7 MG/DL
CTP QC/QA: YES
DIFFERENTIAL METHOD: ABNORMAL
EOSINOPHIL # BLD AUTO: 0.1 K/UL
EOSINOPHIL NFR BLD: 0.8 %
ERYTHROCYTE [DISTWIDTH] IN BLOOD BY AUTOMATED COUNT: 11.7 %
EST. GFR  (AFRICAN AMERICAN): >60 ML/MIN/1.73 M^2
EST. GFR  (NON AFRICAN AMERICAN): >60 ML/MIN/1.73 M^2
GLUCOSE SERPL-MCNC: 93 MG/DL
GLUCOSE UR QL STRIP: NEGATIVE
HCG INTACT+B SERPL-ACNC: 1839 MIU/ML
HCT VFR BLD AUTO: 45.2 %
HGB BLD-MCNC: 15.3 G/DL
HGB UR QL STRIP: ABNORMAL
IMM GRANULOCYTES # BLD AUTO: 0.08 K/UL
IMM GRANULOCYTES NFR BLD AUTO: 0.7 %
KETONES UR QL STRIP: ABNORMAL
LEUKOCYTE ESTERASE UR QL STRIP: NEGATIVE
LYMPHOCYTES # BLD AUTO: 1.6 K/UL
LYMPHOCYTES NFR BLD: 13.6 %
MCH RBC QN AUTO: 32.5 PG
MCHC RBC AUTO-ENTMCNC: 33.8 G/DL
MCV RBC AUTO: 96 FL
MICROSCOPIC COMMENT: NORMAL
MONOCYTES # BLD AUTO: 0.8 K/UL
MONOCYTES NFR BLD: 6.6 %
NEUTROPHILS # BLD AUTO: 9.3 K/UL
NEUTROPHILS NFR BLD: 77.5 %
NITRITE UR QL STRIP: NEGATIVE
NRBC BLD-RTO: 0 /100 WBC
PH UR STRIP: 5 [PH] (ref 5–8)
PLATELET # BLD AUTO: 328 K/UL
PMV BLD AUTO: 9.7 FL
POTASSIUM SERPL-SCNC: 3.7 MMOL/L
PROT UR QL STRIP: NEGATIVE
RBC # BLD AUTO: 4.71 M/UL
RBC #/AREA URNS AUTO: 1 /HPF (ref 0–4)
SODIUM SERPL-SCNC: 137 MMOL/L
SP GR UR STRIP: 1.02 (ref 1–1.03)
SQUAMOUS #/AREA URNS AUTO: 2 /HPF
URN SPEC COLLECT METH UR: ABNORMAL
UROBILINOGEN UR STRIP-ACNC: NEGATIVE EU/DL
WBC # BLD AUTO: 12.06 K/UL
WBC #/AREA URNS AUTO: 1 /HPF (ref 0–5)

## 2018-05-08 PROCEDURE — 25000003 PHARM REV CODE 250: Performed by: STUDENT IN AN ORGANIZED HEALTH CARE EDUCATION/TRAINING PROGRAM

## 2018-05-08 PROCEDURE — 84702 CHORIONIC GONADOTROPIN TEST: CPT

## 2018-05-08 PROCEDURE — 81025 URINE PREGNANCY TEST: CPT | Performed by: EMERGENCY MEDICINE

## 2018-05-08 PROCEDURE — G0378 HOSPITAL OBSERVATION PER HR: HCPCS

## 2018-05-08 PROCEDURE — 80048 BASIC METABOLIC PNL TOTAL CA: CPT

## 2018-05-08 PROCEDURE — 85025 COMPLETE CBC W/AUTO DIFF WBC: CPT

## 2018-05-08 PROCEDURE — 25000003 PHARM REV CODE 250: Performed by: EMERGENCY MEDICINE

## 2018-05-08 PROCEDURE — 81001 URINALYSIS AUTO W/SCOPE: CPT

## 2018-05-08 PROCEDURE — 99285 EMERGENCY DEPT VISIT HI MDM: CPT

## 2018-05-08 RX ORDER — ACETAMINOPHEN 500 MG
1000 TABLET ORAL
Status: COMPLETED | OUTPATIENT
Start: 2018-05-08 | End: 2018-05-08

## 2018-05-08 RX ORDER — ONDANSETRON 8 MG/1
8 TABLET, ORALLY DISINTEGRATING ORAL EVERY 8 HOURS PRN
Status: DISCONTINUED | OUTPATIENT
Start: 2018-05-08 | End: 2018-05-09 | Stop reason: HOSPADM

## 2018-05-08 RX ORDER — CEPHALEXIN 500 MG/1
1000 CAPSULE ORAL
Status: DISCONTINUED | OUTPATIENT
Start: 2018-05-09 | End: 2018-05-09 | Stop reason: CLARIF

## 2018-05-08 RX ORDER — CEPHALEXIN 500 MG/1
500 CAPSULE ORAL EVERY 12 HOURS
Status: DISCONTINUED | OUTPATIENT
Start: 2018-05-08 | End: 2018-05-08

## 2018-05-08 RX ORDER — SODIUM CHLORIDE 0.9 % (FLUSH) 0.9 %
3 SYRINGE (ML) INJECTION
Status: DISCONTINUED | OUTPATIENT
Start: 2018-05-08 | End: 2018-05-09 | Stop reason: HOSPADM

## 2018-05-08 RX ORDER — IBUPROFEN 600 MG/1
600 TABLET ORAL EVERY 6 HOURS PRN
Status: DISCONTINUED | OUTPATIENT
Start: 2018-05-08 | End: 2018-05-09 | Stop reason: HOSPADM

## 2018-05-08 RX ORDER — CEPHALEXIN 500 MG/1
1000 CAPSULE ORAL
Status: DISCONTINUED | OUTPATIENT
Start: 2018-05-09 | End: 2018-05-09

## 2018-05-08 RX ORDER — SODIUM CHLORIDE, SODIUM LACTATE, POTASSIUM CHLORIDE, CALCIUM CHLORIDE 600; 310; 30; 20 MG/100ML; MG/100ML; MG/100ML; MG/100ML
INJECTION, SOLUTION INTRAVENOUS CONTINUOUS
Status: DISCONTINUED | OUTPATIENT
Start: 2018-05-08 | End: 2018-05-09 | Stop reason: HOSPADM

## 2018-05-08 RX ORDER — MORPHINE SULFATE 15 MG/1
15 TABLET ORAL ONCE
Status: COMPLETED | OUTPATIENT
Start: 2018-05-08 | End: 2018-05-08

## 2018-05-08 RX ORDER — HYDROCODONE BITARTRATE AND ACETAMINOPHEN 5; 325 MG/1; MG/1
1 TABLET ORAL EVERY 4 HOURS PRN
Status: DISCONTINUED | OUTPATIENT
Start: 2018-05-08 | End: 2018-05-09 | Stop reason: HOSPADM

## 2018-05-08 RX ORDER — IBUPROFEN 400 MG/1
400 TABLET ORAL
Status: COMPLETED | OUTPATIENT
Start: 2018-05-08 | End: 2018-05-08

## 2018-05-08 RX ORDER — OXYCODONE AND ACETAMINOPHEN 5; 325 MG/1; MG/1
1 TABLET ORAL
Status: COMPLETED | OUTPATIENT
Start: 2018-05-08 | End: 2018-05-08

## 2018-05-08 RX ADMIN — CEPHALEXIN 500 MG: 500 CAPSULE ORAL at 09:05

## 2018-05-08 RX ADMIN — MORPHINE SULFATE 15 MG: 15 TABLET ORAL at 05:05

## 2018-05-08 RX ADMIN — HYDROCODONE BITARTRATE AND ACETAMINOPHEN 1 TABLET: 5; 325 TABLET ORAL at 10:05

## 2018-05-08 RX ADMIN — ACETAMINOPHEN 1000 MG: 500 TABLET, FILM COATED ORAL at 09:05

## 2018-05-08 RX ADMIN — IBUPROFEN 400 MG: 400 TABLET, FILM COATED ORAL at 04:05

## 2018-05-08 RX ADMIN — SODIUM CHLORIDE, SODIUM LACTATE, POTASSIUM CHLORIDE, AND CALCIUM CHLORIDE: .6; .31; .03; .02 INJECTION, SOLUTION INTRAVENOUS at 10:05

## 2018-05-08 RX ADMIN — OXYCODONE HYDROCHLORIDE AND ACETAMINOPHEN 1 TABLET: 5; 325 TABLET ORAL at 01:05

## 2018-05-08 NOTE — ED NOTES
Pt reports pain on a scale of 7/10. Dr. Giordano notified and gave a verbal order for Oxycodone-acetaminophen 5-325mg (1 tablet).

## 2018-05-08 NOTE — LETTER
May 9, 2018         [address]  [place] [address]  Phone: [phone]  Fax: [phone]       Patient: Heydi Goodman   YOB: 1995  Date of Visit: 05/09/2018    To Whom It May Concern:    Heydi Goodman  was at [facility] on 05/08/2018- 05/09/2018. She may return to work/school on 05/10/2018 with no restrictions. If you have any questions or concerns, or if I can be of further assistance, please do not hesitate to contact me.    Sincerely,        Odette Piña MD

## 2018-05-08 NOTE — ED PROVIDER NOTES
Encounter Date: 5/8/2018    SCRIBE #1 NOTE: I, Mary Prado, am scribing for, and in the presence of, Dr. Giordano .       History     Chief Complaint   Patient presents with    Vaginal Bleeding     spotting, cramping, unknow how pregnant     Time seen by provider: 9:10 AM    This is a 23 y.o. female with medical conditions including PID, Kidney stoned and infection, who presents with complaint of vaginal bleeding. The patient was seen at the [facility] ED 5 days ago for dysuria and was treated for a UTI, incidentally she was also found to be pregnant and had an ultrasound done. No IUP was found at that time. The patient reports she has irregular periods and is not able to state when her LNMP was. Now she return with heavy vaginal bleeding and left lower quadrant/flank soreness as well as pelvic cramping. The patient reports she has had light spotting for the past 2 days but woke up this morning with heavy bleeding. She reports that she had intercourse last night. She also complains of intermittent pelvic cramping, abdominal distention, and left flank pain. She denies any fevers, chills, and states her dysuria has improved.       The history is provided by the patient.     Review of patient's allergies indicates:   Allergen Reactions    Ciprofloxacin      Patient stated that when taking this medication she burns and itches and upon getting it via IV she can see the redness travel up her arm.     Past Medical History:   Diagnosis Date    Gastroenteritis     Kidney infection     Kidney stone     Ovarian cyst 10/21/2013    PID (pelvic inflammatory disease) 10/21/2013     Past Surgical History:   Procedure Laterality Date    MOUTH SURGERY      NONE  10/21/2013     Family History   Problem Relation Age of Onset    Diabetes Neg Hx     Hypertension Neg Hx     Stroke Neg Hx     Heart disease Neg Hx      Social History   Substance Use Topics    Smoking status: Current Every Day Smoker     Packs/day: 0.50      Types: Cigarettes    Smokeless tobacco: Never Used    Alcohol use Yes     Review of Systems   Constitutional: Negative for chills and fever.   HENT: Negative for facial swelling and nosebleeds.    Eyes: Negative for visual disturbance.   Respiratory: Negative for shortness of breath.    Cardiovascular: Negative for chest pain.   Gastrointestinal: Positive for abdominal pain.   Genitourinary: Positive for flank pain, pelvic pain and vaginal bleeding. Negative for dysuria.   Musculoskeletal: Negative for gait problem.   Skin: Negative for rash.   Neurological: Negative for speech difficulty.       Physical Exam     Initial Vitals [05/08/18 0815]   BP Pulse Resp Temp SpO2   123/80 84 18 98.2 °F (36.8 °C) 97 %      MAP       94.33         Physical Exam    Nursing note and vitals reviewed.  Constitutional: She appears well-developed and well-nourished. No distress.   HENT:   Head: Normocephalic and atraumatic.   Eyes: EOM are normal. Pupils are equal, round, and reactive to light.   Neck: Normal range of motion. Neck supple.   Cardiovascular: Normal rate and regular rhythm.   Pulmonary/Chest: Breath sounds normal. No respiratory distress.   Abdominal: Soft. She exhibits no distension. There is tenderness (Minimal lower abdominal tenderness).   Genitourinary:   Genitourinary Comments: Small amount of dark blood in the vault, cervical os is closed   Musculoskeletal: Normal range of motion. She exhibits no edema.   Neurological: She is alert and oriented to person, place, and time. She has normal strength.   Skin: Skin is warm and dry.         ED Course   Procedures  Labs Reviewed   CBC W/ AUTO DIFFERENTIAL - Abnormal; Notable for the following:        Result Value    MCH 32.5 (*)     Immature Granulocytes 0.7 (*)     Gran # (ANC) 9.3 (*)     Immature Grans (Abs) 0.08 (*)     Gran% 77.5 (*)     Lymph% 13.6 (*)     All other components within normal limits   URINALYSIS, REFLEX TO URINE CULTURE - Abnormal; Notable for the  following:     Appearance, UA Hazy (*)     Ketones, UA Trace (*)     Occult Blood UA 3+ (*)     All other components within normal limits    Narrative:     Preferred Collection Type->Urine, Clean Catch   POCT URINE PREGNANCY - Abnormal; Notable for the following:     POC Preg Test, Ur Positive (*)     All other components within normal limits   HCG, QUANTITATIVE, PREGNANCY   BASIC METABOLIC PANEL   URINALYSIS MICROSCOPIC    Narrative:     Preferred Collection Type->Urine, Clean Catch             Medical Decision Making:   History:   Old Medical Records: I decided to obtain old medical records.  Old Records Summarized: records from clinic visits.       <> Summary of Records: Patient seen at [facility] ED on May 3. There she was treated with a shot of ceftriaxone while in the ED and a course of keflex as outpatient for pyelonephritis. Additionally the patient was found to be pregnant with a bHCG of 339. Formal ultrasound was done but did not show IUP or free fluid in the abdomen.   Initial Assessment:   24 yo f, , here with VB x 2 days, lower abd pain.  Being tx'd for cx + pyelo with appropriate abx, reports urinary sx improving.  sono 5 days ago with no IUP, BHCG on 339.    On exam, mild lower abd tend, os closed, + bleeding.  Bedside sono no IUP, no free fluid  Differential Diagnosis:   Early pregnancy with threatened ab  Ectopic pregnancy  ED Management:  Repeat Bhcg  Repeat sono given worsening pain and no IUP            Scribe Attestation:   Scribe #1: I performed the above scribed service and the documentation accurately describes the services I performed. I attest to the accuracy of the note.    Attending Attestation:             Attending ED Notes:   1:14 PM  Ultrasound c/w ectopic on left with free fluid  Transfer request placed, needs stat transfer to [facility] for OB evaluation    1:36 PM  Report given to ED attending at [facility] for transfer             Clinical Impression:   The primary encounter  diagnosis was Left ovarian pregnancy without intrauterine pregnancy. Diagnoses of Vaginal bleeding and Pregnancy of unknown anatomic location were also pertinent to this visit.       I, Dr. Kendra Giordano, personally performed the services described in this documentation. All medical record entries made by the scribe were at my direction and in my presence.  I have reviewed the chart and agree that the record reflects my personal performance and is accurate and complete. Kendra Giordano MD.  10:40 AM 05/10/2018                        Kendra Giordano MD  05/10/18 0856

## 2018-05-08 NOTE — ED PROVIDER NOTES
Encounter Date: 5/8/2018    SCRIBE #1 NOTE: I, Magi Hooper, am scribing for, and in the presence of, Dr. Montano.       History     Chief Complaint   Patient presents with    Vaginal Bleeding     spotting, cramping, unknow how pregnant     Time seen by provider: 5:56 PM    This is a 23 y.o. female who presents with complaint of vaginal bleeding. Patient was transferred from [facility] To be evaluated by Ob/Gyn. She received US today that did not show an IUP, but suspicion for ectopic within the left adnexa. She is unsure many weeks pregnant. She reports assoicated abdominal pain that is located in the LLQ. She states pain has improved. She denies fever, chills, nausea, and vomiting. She reports two previous ovarian cysts.       The history is provided by the patient.     Review of patient's allergies indicates:   Allergen Reactions    Ciprofloxacin      Patient stated that when taking this medication she burns and itches and upon getting it via IV she can see the redness travel up her arm.     Past Medical History:   Diagnosis Date    Gastroenteritis     Kidney infection     Kidney stone     Ovarian cyst 10/21/2013    PID (pelvic inflammatory disease) 10/21/2013     Past Surgical History:   Procedure Laterality Date    MOUTH SURGERY      NONE  10/21/2013     Family History   Problem Relation Age of Onset    Diabetes Neg Hx     Hypertension Neg Hx     Stroke Neg Hx     Heart disease Neg Hx      Social History   Substance Use Topics    Smoking status: Current Every Day Smoker     Packs/day: 0.50     Types: Cigarettes    Smokeless tobacco: Never Used    Alcohol use Yes     Review of Systems   Constitutional: Negative for chills and fever.   HENT: Negative for sore throat.    Respiratory: Negative for shortness of breath.    Cardiovascular: Negative for chest pain.   Gastrointestinal: Positive for abdominal pain. Negative for nausea and vomiting.   Genitourinary: Positive for vaginal  bleeding. Negative for dysuria.   Musculoskeletal: Negative for back pain.   Skin: Negative for rash.   Neurological: Negative for weakness.   Hematological: Does not bruise/bleed easily.       Physical Exam     Initial Vitals [05/08/18 0815]   BP Pulse Resp Temp SpO2   123/80 84 18 98.2 °F (36.8 °C) 97 %      MAP       94.33         Physical Exam    Nursing note and vitals reviewed.  Constitutional: She appears well-developed and well-nourished. No distress.   HENT:   Head: Normocephalic and atraumatic.   Eyes: EOM are normal.   No pallor or icterus.    Abdominal: Soft. There is no tenderness.   No palpable mass above the pelvic brim.     Neurological: She is alert and oriented to person, place, and time.         ED Course   Procedures  Labs Reviewed   CBC W/ AUTO DIFFERENTIAL - Abnormal; Notable for the following:        Result Value    MCH 32.5 (*)     Immature Granulocytes 0.7 (*)     Gran # (ANC) 9.3 (*)     Immature Grans (Abs) 0.08 (*)     Gran% 77.5 (*)     Lymph% 13.6 (*)     All other components within normal limits   URINALYSIS, REFLEX TO URINE CULTURE - Abnormal; Notable for the following:     Appearance, UA Hazy (*)     Ketones, UA Trace (*)     Occult Blood UA 3+ (*)     All other components within normal limits    Narrative:     Preferred Collection Type->Urine, Clean Catch   POCT URINE PREGNANCY - Abnormal; Notable for the following:     POC Preg Test, Ur Positive (*)     All other components within normal limits   HCG, QUANTITATIVE, PREGNANCY   BASIC METABOLIC PANEL   URINALYSIS MICROSCOPIC    Narrative:     Preferred Collection Type->Urine, Clean Catch             Medical Decision Making:   ED Management:  6:52 PM Ob/Gyn is evaluating.    7:44 PM Decision of OB to admit for observation.                  Attending Attestation:           Physician Attestation for Scribe:  Physician Attestation Statement for Scribe #1: I, Dr. Montano, reviewed documentation, as scribed by Magi Hooper in my presence,  and it is both accurate and complete.          patient presents, referred from [facility] due to concern for ectopic pregnancy.  Patient had lower abdominal pain earlier today.  Ultrasound showed a complex mass in tube concerning for ectopic, without intrauterine pregnancy.  My exam patient is comfortable appearing, benign abdomen.  GYN service consult, evaluate the patient, will admit for observation serial exams           Clinical Impression:     1. Left ovarian pregnancy without intrauterine pregnancy    2. Vaginal bleeding                                 Remi Montano II, MD  05/08/18 2023

## 2018-05-08 NOTE — ED NOTES
"Pt is sitting in room, cursing and upset because she is just "sitting in the room, bleeding out and dying" per patient.   "

## 2018-05-08 NOTE — ED NOTES
Spoke with ultrasound dept.and was given instruction to have patient start drinking water for US prep.  Patient was given water, instructed, and she verbalized understanding.

## 2018-05-08 NOTE — ED NOTES
Pt aware we are awaiting acceptance to [facility] for bed assignment.  No c/o.  Resp E/U

## 2018-05-08 NOTE — ED TRIAGE NOTES
Pregnant female coming to ER due to vaginal bleeding and abdominal cramping since this am.  Pt identifiers checked and correct    LOC:   · The pt is awake, alert, and aware of environment with an appropriate affect,  as well as speaking appropriately; AAOx3 to person, place, and situation  APPEARANCE:   · Pt resting comfortably and in no acute distress; clean and well groomed  SKIN:   · Skin is warm and dry; color consistent with ethnicity; pt has normal skin turgor and moist mucus membranes  · Skin intact w/ no breakdown or brusing noted  MUSCULOSKELETAL:   · Pt moving all extremities well; absent of obvious deformities; Ambulates independently  RESPIRATORY:   · Airway is open and patent; respirations are spontaneous; normal effort and rate noted; absent of accessory-muscle use  · Breath sounds auscultated in all lung fields are noted to be clear and absent of adventitious sounds  CARDIAC:   · Pt denies chest pain; normal heart sounds auscultated; Pt has no peripheral edema noted; capillary refill < 3 seconds  · 2+ pulses palpated in all extremities   ABDOMEN:   · Soft and tender to palpation; no abnormal distention noted/reported; bowel sounds present x 4  NEUROLOGIC:   · PERRL, 3mm bilaterally, eyes open spontaneously; pt follows commands; facial expression symmetrical; equal hand  bilaterally; purposeful motor response noted  · Normal sensation to touch reported in distal region of all extremities

## 2018-05-09 VITALS
DIASTOLIC BLOOD PRESSURE: 75 MMHG | HEART RATE: 61 BPM | RESPIRATION RATE: 17 BRPM | WEIGHT: 141.13 LBS | BODY MASS INDEX: 24.1 KG/M2 | HEIGHT: 64 IN | TEMPERATURE: 98 F | SYSTOLIC BLOOD PRESSURE: 135 MMHG | OXYGEN SATURATION: 99 %

## 2018-05-09 PROBLEM — N12 PYELONEPHRITIS: Status: ACTIVE | Noted: 2018-05-09

## 2018-05-09 PROBLEM — N94.9 ADNEXAL CYST: Status: ACTIVE | Noted: 2018-05-09

## 2018-05-09 PROCEDURE — 25000003 PHARM REV CODE 250: Performed by: STUDENT IN AN ORGANIZED HEALTH CARE EDUCATION/TRAINING PROGRAM

## 2018-05-09 PROCEDURE — 99235 HOSP IP/OBS SAME DATE MOD 70: CPT | Mod: ,,, | Performed by: OBSTETRICS & GYNECOLOGY

## 2018-05-09 PROCEDURE — G0378 HOSPITAL OBSERVATION PER HR: HCPCS

## 2018-05-09 RX ORDER — CEPHALEXIN 500 MG/1
1000 CAPSULE ORAL
Status: DISCONTINUED | OUTPATIENT
Start: 2018-05-10 | End: 2018-05-09 | Stop reason: CLARIF

## 2018-05-09 RX ORDER — CEPHALEXIN 500 MG/1
1000 CAPSULE ORAL
Status: DISCONTINUED | OUTPATIENT
Start: 2018-05-10 | End: 2018-05-09

## 2018-05-09 RX ORDER — CEPHALEXIN 500 MG/1
1000 CAPSULE ORAL
Status: DISCONTINUED | OUTPATIENT
Start: 2018-05-09 | End: 2018-05-09 | Stop reason: HOSPADM

## 2018-05-09 RX ORDER — CEPHALEXIN 500 MG/1
1000 CAPSULE ORAL
Status: DISCONTINUED | OUTPATIENT
Start: 2018-05-09 | End: 2018-05-09

## 2018-05-09 RX ORDER — DOCUSATE SODIUM 100 MG/1
100 CAPSULE, LIQUID FILLED ORAL ONCE
Status: COMPLETED | OUTPATIENT
Start: 2018-05-09 | End: 2018-05-09

## 2018-05-09 RX ORDER — CEPHALEXIN 500 MG/1
1000 CAPSULE ORAL
Status: DISCONTINUED | OUTPATIENT
Start: 2018-05-09 | End: 2018-05-09 | Stop reason: CLARIF

## 2018-05-09 RX ADMIN — SODIUM CHLORIDE, SODIUM LACTATE, POTASSIUM CHLORIDE, AND CALCIUM CHLORIDE: .6; .31; .03; .02 INJECTION, SOLUTION INTRAVENOUS at 06:05

## 2018-05-09 RX ADMIN — CEPHALEXIN 1000 MG: 500 CAPSULE ORAL at 05:05

## 2018-05-09 RX ADMIN — DOCUSATE SODIUM 100 MG: 100 CAPSULE, LIQUID FILLED ORAL at 06:05

## 2018-05-09 NOTE — PLAN OF CARE
05/09/18 0928   Final Note   Assessment Type Final Discharge Note   Discharge Disposition Home   Hospital Follow Up  Appt(s) scheduled? Yes   Discharge plans and expectations educations in teach back method with documentation complete? Yes   Right Care Referral Info   Post Acute Recommendation No Care   Referral Type see AVS

## 2018-05-09 NOTE — ASSESSMENT & PLAN NOTE
- Diagnosed at [facility] ED on 5/3  - Continue Keflex from that ED visit - 500mg BID  - Patient denies symptoms currenlty  - UA with ketones and RBC, culture pending

## 2018-05-09 NOTE — SUBJECTIVE & OBJECTIVE
Interval History: Pt doing well this morning. Denies pain. No nausea/vomiting. Patient not having any vaginal bleeding. Ambulating, voiding, reports mild constipation.       Objective:     Vital Signs (Most Recent):  Temp: 98.7 °F (37.1 °C) (05/09/18 0410)  Pulse: (!) 59 (05/09/18 0410)  Resp: 16 (05/09/18 0410)  BP: 126/70 (05/09/18 0410)  SpO2: 95 % (05/09/18 0410) Vital Signs (24h Range):  Temp:  [98.1 °F (36.7 °C)-98.7 °F (37.1 °C)] 98.7 °F (37.1 °C)  Pulse:  [59-84] 59  Resp:  [16-18] 16  SpO2:  [92 %-100 %] 95 %  BP: (122-134)/(61-84) 126/70     Weight: 64 kg (141 lb 1.5 oz)  Body mass index is 24.22 kg/m².      Intake/Output Summary (Last 24 hours) at 05/09/18 0735  Last data filed at 05/09/18 0500   Gross per 24 hour   Intake                0 ml   Output                0 ml   Net                0 ml     Significant Labs:  Recent Lab Results       05/08/18  0938 05/08/18  0930 05/08/18  0831      Immature Granulocytes  0.7(H)      Immature Grans (Abs)  0.08  Comment:  Mild elevation in immature granulocytes is non specific and   can be seen in a variety of conditions including stress response,   acute inflammation, trauma and pregnancy. Correlation with other   laboratory and clinical findings is essential.  (H)      Anion Gap  8      Appearance, UA Hazy(A)       Baso #  0.10      Basophil%  0.8      Bilirubin (UA) Negative       BUN, Bld  8      Calcium  9.9      Chloride  104      CO2  25      Color, UA Yellow       Creatinine  0.7      Differential Method  Automated      eGFR if   >60.0      eGFR if non   >60.0  Comment:  Calculation used to obtain the estimated glomerular filtration  rate (eGFR) is the CKD-EPI equation.         Eos #  0.1      Eosinophil%  0.8      Glucose  93      Glucose, UA Negative       Gran # (ANC)  9.3(H)      Gran%  77.5(H)      hCG Quant  1839  Comment:  Quantitative HCG Reference Ranges:  Males........................<5.0 mIU/mL  Non-Pregnant  Females.........<5.0 mIU/mL  Pregnancy:  Weeks post-LMP...............Range (mIU/mL)  1-10  weeks.....................202-231,000  11-15 weeks..................22,536-234,990  16-22 weeks...................8,007-50,064  23-40 weeks...................1,600-49,413  NOTE:  This assay is not FDA approved for tumor screening,   diagnosis, or monitoring.        Hematocrit  45.2      Hemoglobin  15.3      Ketones, UA Trace(A)       Leukocytes, UA Negative       Lymph #  1.6      Lymph%  13.6(L)      MCH  32.5(H)      MCHC  33.8      MCV  96      Microscopic Comment SEE COMMENT  Comment:  Other formed elements not mentioned in the report are not   present in the microscopic examination.          Mono #  0.8      Mono%  6.6      MPV  9.7      Nitrite, UA Negative       nRBC  0      Occult Blood UA 3+(A)       pH, UA 5.0       Platelets  328      Potassium  3.7      Preg Test, Ur   Positive(A)     Protein, UA Negative  Comment:  Recommend a 24 hour urine protein or a urine   protein/creatinine ratio if globulin induced proteinuria is  clinically suspected.          Acceptable   Yes     RBC  4.71      RBC, UA 1       RDW  11.7      Sodium  137      Specific Gravity, UA 1.020       Specimen UA Urine, Clean Catch       Squam Epithel, UA 2       Urobilinogen, UA Negative       WBC, UA 1       WBC  12.06            Physical Exam:   Constitutional: She is oriented to person, place, and time. She appears well-developed and well-nourished. No distress.    HENT:   Head: Normocephalic and atraumatic.       Pulmonary/Chest: Effort normal. No respiratory distress.        Abdominal: Soft. She exhibits no distension and no mass. There is no tenderness. There is no rebound and no guarding.             Musculoskeletal: Moves all extremeties.       Neurological: She is alert and oriented to person, place, and time.    Skin: Skin is warm and dry. She is not diaphoretic.    Psychiatric: She has a normal mood and affect. Her  behavior is normal. Judgment and thought content normal.

## 2018-05-09 NOTE — PLAN OF CARE
05/09/18 0926   Discharge Assessment   Assessment Type Discharge Planning Assessment   Confirmed/corrected address and phone number on facesheet? Yes   Assessment information obtained from? Patient;Caregiver;Medical Record   Communicated expected length of stay with patient/caregiver yes   Prior to hospitilization cognitive status: Alert/Oriented   Prior to hospitalization functional status: Independent   Current cognitive status: Alert/Oriented   Current Functional Status: Independent   Able to Return to Prior Arrangements yes   Is patient able to care for self after discharge? Yes   Patient currently being followed by outpatient case management? No   Do you have any problems affording any of your prescribed medications? No   Is the patient taking medications as prescribed? yes   Does the patient have transportation home? Yes   Transportation Available family or friend will provide   Discharge Plan A Home   Patient/Family In Agreement With Plan yes

## 2018-05-09 NOTE — HOSPITAL COURSE
05/08/2018 - Admitted to observation with pregnancy of unknown location/possible ectopic. PE benign, no further bleeding. Consents for blood, D&C, laparoscopy, and methotrexate therapy signed in the ED. NPO at midnight, repeat labs in AM.   05/09/2018 - Pt doing well. No nausea/vomiting, no pain, no vaginal bleeding. Stable for dc with precautions and close follow up. Instructions reviewed with patient.

## 2018-05-09 NOTE — PLAN OF CARE
Discharge Planning:  Patient admitted on 5-8-18  LOS-day 1  Chart reviewed  Care plan discussed  Discussed care plan with treatment team  Current dispo - home today  Case management  to follow  Consults following are: case mgt  Per Ms Valentinsohna. She will follow up for all of her medical needs with Dr Reyes.  Pharmacy- [facility] in [place], [place]

## 2018-05-09 NOTE — ASSESSMENT & PLAN NOTE
Admit to observation overnight due to concern for ectopic (versus threatened/missed ):  - TVUS with no gestational sac and complex cystic structure in L adnexa  - Patient at increased risk for ectopic due to h/o PID  - b-hcg with appropriate rise from 339 on 5/3 to 1839   - Bleeding has stopped, patient denies pain  - Cervix closed, no bleeding, bimanual exam with no tenderness  - CBC: H/H 15/45, WBC 12  - Staff aware  - Consents for blood, D&C, laparoscopy/salpingectomy, and methotrexate signed  - Regular diet now, NPO at midnight  - Maintenance IVF  - Tylenol PRN pain  - Continue home Keflex for pyelonephritis  - Examine in AM and consider repeat b-hCG (versus repeat 48h from last value)

## 2018-05-09 NOTE — HPI
"Heydi Goodman is a 23 y.o.  at unknown gestational age who presented as a transfer from [facility] to  ED with pregnancy of unknown location and US findings concerning for ectopic pregnancy. On presentation this morning, she had CC of severe LLQ pain and pelvic cramping with vaginal bleeding that began this morning when she woke up and was lighter than a period. At [facility] ED she was given morphine x 1. Beta-hCG was 1839. Transabdominal followed by TVUS showed uterus and ovaries WNL, endometrial stripe 0.6cm, moderate volume of free fluid in the pelvis, and a complex cystic structure identified within the left adnexa "suspicious for an ectopic pregnancy." Currently, she says vaginal bleeding is minimal or resolved, and she has no pain.     [place] [facility] [place] with CC of L sided flank/back pain and dysuria. Based on UA and physical exam, as well as h/o pyelonephritis, patient was treated for presumed pyelonephritis and discharged with cefalexin 500 mg BID. She had an incidentally found positive pregnancy test.  Beta-hCG was 339 and US showed no gestational sac. She was instructed to repeat b-hCG in 2 days. She had light spotting at home over the next 2-3 days, before waking up today with significant bleeding and cramping as described above.    PMH is significant for h/o pyelonephritis, h/o recurrent nephrolithiasis, h/o PID with chlamydia infection, and tobacco use. She has never had a PCP and does not know the etiology of her recurrent  infections.     Gynecologic history significant for long history of menometrorrhagia with very irregular cycles and bleeding heavily for long periods of time, requiring blood transfusion in the past. Patient has tried OCPs in the past, but says she did not tolerate them. She has never tried other methods of menstrual cycle regulation or birth control.    Social history significant for recent sexual activity with >1 male partner, although the patient " reports she is  to and continues to be in a relationship with a female partner.

## 2018-05-09 NOTE — H&P
"[facility]  Obstetrics  History & Physical    Patient Name: Heydi Goodman  MRN: [id number]  Admission Date: 2018  Primary Care Provider: Clarissa Ramos MD    Subjective:     Principal Problem:Pregnancy of unknown anatomic location    History of Present Illness:  Heydi Goodman is a 23 y.o.  at unknown gestational age who presented as a transfer from [facility] to  ED with pregnancy of unknown location and US findings concerning for ectopic pregnancy. On presentation this morning, she had CC of severe LLQ pain and pelvic cramping with vaginal bleeding that began this morning when she woke up and was lighter than a period. At [facility] ED she was given morphine x 1. Beta-hCG was 1839. Transabdominal followed by TVUS showed uterus and ovaries WNL, endometrial stripe 0.6cm, moderate volume of free fluid in the pelvis, and a complex cystic structure identified within the left adnexa "suspicious for an ectopic pregnancy." Currently, she says vaginal bleeding is minimal or resolved, and she has no pain.     Patient was seen at the [facility] ED on 5/3/18 with CC of L sided flank/back pain and dysuria. Based on UA and physical exam, as well as h/o pyelonephritis, patient was treated for presumed pyelonephritis and discharged with cefalexin 500 mg BID. She had an incidentally found positive pregnancy test.  Beta-hCG was 339 and US showed no gestational sac. She was instructed to repeat b-hCG in 2 days. She had light spotting at home over the next 2-3 days, before waking up today with significant bleeding and cramping as described above.    PMH is significant for h/o pyelonephritis, h/o recurrent nephrolithiasis, h/o PID with chlamydia infection, and tobacco use. She has never had a PCP and does not know the etiology of her recurrent  infections.     Gynecologic history significant for long history of menometrorrhagia with very irregular cycles and bleeding heavily for long periods of time, " requiring blood transfusion in the past. Patient has tried OCPs in the past, but says she did not tolerate them. She has never tried other methods of menstrual cycle regulation or birth control.    Social history significant for recent sexual activity with >1 male partner, although the patient reports she is  to and continues to be in a relationship with a female partner.      Obstetric History       T0      L0     SAB0   TAB0   Ectopic0   Multiple0   Live Births0       # Outcome Date GA Lbr Fidel/2nd Weight Sex Delivery Anes PTL Lv   1 Current                 Past Medical History:   Diagnosis Date    Gastroenteritis     Kidney infection     Kidney stone     Ovarian cyst 10/21/2013    PID (pelvic inflammatory disease) 10/21/2013     Past Surgical History:   Procedure Laterality Date    MOUTH SURGERY      NONE  10/21/2013     PTA Medications   Medication Sig    cephALEXin (KEFLEX) 500 MG capsule Take 2 capsules (1,000 mg total) by mouth every 8 (eight) hours.    prenatal 21-iron fu-folic acid (PRENATAL COMPLETE) 14 mg iron- 400 mcg Tab Take 1 tablet by mouth once daily.    acetaminophen (TYLENOL) 500 MG tablet Take 2 tablets (1,000 mg total) by mouth 3 (three) times daily as needed for Pain.    ibuprofen (ADVIL,MOTRIN) 200 MG tablet Take 200 mg by mouth every 6 (six) hours as needed for Pain.     Review of patient's allergies indicates:   Allergen Reactions    Ciprofloxacin      Patient stated that when taking this medication she burns and itches and upon getting it via IV she can see the redness travel up her arm.      Family History     None        Social History Main Topics    Smoking status: Current Every Day Smoker     Packs/day: 0.50     Types: Cigarettes    Smokeless tobacco: Never Used    Alcohol use Yes    Drug use: No    Sexual activity: Yes     Partners: Male     Birth control/ protection: None     Review of Systems   Constitutional: Negative.  Negative for activity  change, chills, diaphoresis, fatigue, fever and unexpected weight change.   Respiratory: Negative for cough and shortness of breath.    Cardiovascular: Negative for chest pain and palpitations.   Gastrointestinal: Positive for constipation. Negative for abdominal pain, diarrhea, nausea and vomiting.   Genitourinary: Positive for menorrhagia, menstrual problem, pelvic pain (none currently), vaginal bleeding and dysmenorrhea. Negative for dyspareunia, dysuria (currently resolved), flank pain (currently resolved), frequency, genital sores, hematuria, vaginal discharge, vaginal pain, urinary incontinence, postcoital bleeding and vaginal odor.   Musculoskeletal: Positive for back pain. Negative for myalgias.   Neurological: Negative for headaches.   Psychiatric/Behavioral: Negative for depression.   Breast: Negative for breast pain     Objective:     Vital Signs (Most Recent):  Temp: 98.3 °F (36.8 °C) (05/08/18 2201)  Pulse: 83 (05/08/18 2201)  Resp: 18 (05/08/18 1622)  BP: 130/71 (05/08/18 2201)  SpO2: (!) 92 % (05/08/18 2201) Vital Signs (24h Range):  Temp:  [98.1 °F (36.7 °C)-98.7 °F (37.1 °C)] 98.3 °F (36.8 °C)  Pulse:  [61-84] 83  Resp:  [18] 18  SpO2:  [92 %-100 %] 92 %  BP: (122-134)/(61-84) 130/71     Weight: 64.4 kg (142 lb)  Body mass index is 24.37 kg/m².    Physical Exam:   Constitutional: She is oriented to person, place, and time. She appears well-developed and well-nourished. No distress.    HENT:   Head: Normocephalic and atraumatic.   Mouth/Throat: Oropharynx is clear and moist.    Eyes: Conjunctivae and EOM are normal. No scleral icterus.    Neck: Neck supple.    Cardiovascular: Normal rate, regular rhythm and normal heart sounds.  Exam reveals no edema.     Pulmonary/Chest: Effort normal and breath sounds normal. No respiratory distress.        Abdominal: Soft. Bowel sounds are normal. She exhibits no distension and no mass. There is no tenderness (No tenderness to palpation on superficial or deep  palpation). There is no rebound and no guarding.     Genitourinary: Vagina normal and uterus normal. No vaginal discharge found.   Genitourinary Comments: Vulva and perineum normal appearing without lesions, rash, or loss of normal structures. No blood or discharge at introitus. No masses in bilateral adnexa. Ovaries not palpated. Uterus is midline, small, firm, with smooth contour. No CMT. Cervical os closed, cervix thick and posterior. No tenderness to bimanual palpation. Scant old blood on glove after exam. No red blood.             Musculoskeletal: Normal range of motion and moves all extremeties.       Neurological: She is alert and oriented to person, place, and time.    Skin: Skin is warm and dry. She is not diaphoretic.    Psychiatric: She has a normal mood and affect. Her behavior is normal. Judgment and thought content normal.     Significant Labs:  Lab Results   Component Value Date    GROUPTRH O POS 2011    HEPBSAG Negative 2011     CBC:   Recent Labs  Lab 18  0930   WBC 12.06   RBC 4.71   HGB 15.3   HCT 45.2      MCV 96   MCH 32.5*   MCHC 33.8     I have personallly reviewed all pertinent lab results from the last 24 hours.    Assessment/Plan:     23 y.o. female  at Unknown for:    * Pregnancy of unknown anatomic location    Admit to observation overnight due to concern for ectopic (versus threatened/missed ):  - TVUS with no gestational sac and complex cystic structure in L adnexa  - Patient at increased risk for ectopic due to h/o PID  - b-hcg with appropriate rise from 339 on 5/3 to 1839   - Bleeding has stopped, patient denies pain  - Cervix closed, no bleeding, bimanual exam with no tenderness  - CBC: H/H 15/45, WBC 12  - Staff aware  - Consents for blood, D&C, laparoscopy/salpingectomy, and methotrexate signed  - Regular diet now, NPO at midnight  - Maintenance IVF  - Tylenol PRN pain  - Continue home Keflex for pyelonephritis  - Examine in AM and consider  repeat b-hCG (versus repeat 48h from last value)        Pyelonephritis    - Diagnosed at [facility] ED on 5/3  - Continue Keflex from that ED visit - 500mg BID  - Patient denies symptoms currenlty  - UA with ketones and RBC, culture pending            Dawit Ribeiro MD  Obstetrics  [facility]    Admitted for observation 2nd to pregnancy of unknown location, has ovarian cyst, no gestational sac, appropriate sequential rise in Beta HCG. Risk for ectopic pregnancy.

## 2018-05-09 NOTE — PROGRESS NOTES
"[facility]  Obstetrics  Antepartum Progress Note    Patient Name: Heydi Goodman  MRN: [id number]  Admission Date: 2018  Hospital Length of Stay: 0 days  Attending Physician: Eloy Myles MD  Primary Care Provider: Clarissa Ramos MD    Subjective:     Principal Problem:Pregnancy of unknown anatomic location    HPI:  Heydi Goodman is a 23 y.o.  at unknown gestational age who presented as a transfer from [facility] to  ED with pregnancy of unknown location and US findings concerning for ectopic pregnancy. On presentation this morning, she had CC of severe LLQ pain and pelvic cramping with vaginal bleeding that began this morning when she woke up and was lighter than a period. At [facility] ED she was given morphine x 1. Beta-hCG was 1839. Transabdominal followed by TVUS showed uterus and ovaries WNL, endometrial stripe 0.6cm, moderate volume of free fluid in the pelvis, and a complex cystic structure identified within the left adnexa "suspicious for an ectopic pregnancy." Currently, she says vaginal bleeding is minimal or resolved, and she has no pain.     Patient was seen at the [facility] ED on 5/3/18 with CC of L sided flank/back pain and dysuria. Based on UA and physical exam, as well as h/o pyelonephritis, patient was treated for presumed pyelonephritis and discharged with cefalexin 500 mg BID. She had an incidentally found positive pregnancy test.  Beta-hCG was 339 and US showed no gestational sac. She was instructed to repeat b-hCG in 2 days. She had light spotting at home over the next 2-3 days, before waking up today with significant bleeding and cramping as described above.    PMH is significant for h/o pyelonephritis, h/o recurrent nephrolithiasis, h/o PID with chlamydia infection, and tobacco use. She has never had a PCP and does not know the etiology of her recurrent  infections.     Gynecologic history significant for long history of menometrorrhagia with very " irregular cycles and bleeding heavily for long periods of time, requiring blood transfusion in the past. Patient has tried OCPs in the past, but says she did not tolerate them. She has never tried other methods of menstrual cycle regulation or birth control.    Social history significant for recent sexual activity with >1 male partner, although the patient reports she is  to and continues to be in a relationship with a female partner.    Hospital Course:  05/08/2018 - Admitted to observation with pregnancy of unknown location/possible ectopic. PE benign, no further bleeding. Consents for blood, D&C, laparoscopy, and methotrexate therapy signed in the ED. NPO at midnight, repeat labs in AM.   05/09/2018 - Pt doing well. No nausea/vomiting, no pain, no vaginal bleeding. Stable for dc with precautions and close follow up. Instructions reviewed with patient.    Interval History: Pt doing well this morning. Denies pain. No nausea/vomiting. Patient not having any vaginal bleeding. Ambulating, voiding, reports mild constipation.       Objective:     Vital Signs (Most Recent):  Temp: 98.7 °F (37.1 °C) (05/09/18 0410)  Pulse: (!) 59 (05/09/18 0410)  Resp: 16 (05/09/18 0410)  BP: 126/70 (05/09/18 0410)  SpO2: 95 % (05/09/18 0410) Vital Signs (24h Range):  Temp:  [98.1 °F (36.7 °C)-98.7 °F (37.1 °C)] 98.7 °F (37.1 °C)  Pulse:  [59-84] 59  Resp:  [16-18] 16  SpO2:  [92 %-100 %] 95 %  BP: (122-134)/(61-84) 126/70     Weight: 64 kg (141 lb 1.5 oz)  Body mass index is 24.22 kg/m².      Intake/Output Summary (Last 24 hours) at 05/09/18 0735  Last data filed at 05/09/18 0500   Gross per 24 hour   Intake                0 ml   Output                0 ml   Net                0 ml     Significant Labs:  Recent Lab Results       05/08/18  0938 05/08/18  0930 05/08/18  0831      Immature Granulocytes  0.7(H)      Immature Grans (Abs)  0.08  Comment:  Mild elevation in immature granulocytes is non specific and   can be seen in a  variety of conditions including stress response,   acute inflammation, trauma and pregnancy. Correlation with other   laboratory and clinical findings is essential.  (H)      Anion Gap  8      Appearance, UA Hazy(A)       Baso #  0.10      Basophil%  0.8      Bilirubin (UA) Negative       BUN, Bld  8      Calcium  9.9      Chloride  104      CO2  25      Color, UA Yellow       Creatinine  0.7      Differential Method  Automated      eGFR if   >60.0      eGFR if non   >60.0  Comment:  Calculation used to obtain the estimated glomerular filtration  rate (eGFR) is the CKD-EPI equation.         Eos #  0.1      Eosinophil%  0.8      Glucose  93      Glucose, UA Negative       Gran # (ANC)  9.3(H)      Gran%  77.5(H)      hCG Quant  1839  Comment:  Quantitative HCG Reference Ranges:  Males........................<5.0 mIU/mL  Non-Pregnant Females.........<5.0 mIU/mL  Pregnancy:  Weeks post-LMP...............Range (mIU/mL)  1-10  weeks.....................202-231,000  11-15 weeks..................22,536-234,990  16-22 weeks...................8,007-50,064  23-40 weeks...................1,600-49,413  NOTE:  This assay is not FDA approved for tumor screening,   diagnosis, or monitoring.        Hematocrit  45.2      Hemoglobin  15.3      Ketones, UA Trace(A)       Leukocytes, UA Negative       Lymph #  1.6      Lymph%  13.6(L)      MCH  32.5(H)      MCHC  33.8      MCV  96      Microscopic Comment SEE COMMENT  Comment:  Other formed elements not mentioned in the report are not   present in the microscopic examination.          Mono #  0.8      Mono%  6.6      MPV  9.7      Nitrite, UA Negative       nRBC  0      Occult Blood UA 3+(A)       pH, UA 5.0       Platelets  328      Potassium  3.7      Preg Test, Ur   Positive(A)     Protein, UA Negative  Comment:  Recommend a 24 hour urine protein or a urine   protein/creatinine ratio if globulin induced proteinuria is  clinically suspected.           Acceptable   Yes     RBC  4.71      RBC, UA 1       RDW  11.7      Sodium  137      Specific Gravity, UA 1.020       Specimen UA Urine, Clean Catch       Squam Epithel, UA 2       Urobilinogen, UA Negative       WBC, UA 1       WBC  12.06            Physical Exam:   Constitutional: She is oriented to person, place, and time. She appears well-developed and well-nourished. No distress.    HENT:   Head: Normocephalic and atraumatic.       Pulmonary/Chest: Effort normal. No respiratory distress.        Abdominal: Soft. She exhibits no distension and no mass. There is no tenderness. There is no rebound and no guarding.             Musculoskeletal: Moves all extremeties.       Neurological: She is alert and oriented to person, place, and time.    Skin: Skin is warm and dry. She is not diaphoretic.    Psychiatric: She has a normal mood and affect. Her behavior is normal. Judgment and thought content normal.       Assessment/Plan:     23 y.o. female  at Unknown for:    * Pregnancy of unknown anatomic location    - TVUS with no gestational sac and complex cystic structure in L adnexa  - Patient at increased risk for ectopic due to h/o PID  - b-hcg with appropriate rise from 339 on 5/3 to 1839   - Bleeding has stopped, patient denies pain  - Cervix closed, no bleeding, bimanual exam with no tenderness  - CBC: H/H 15/45, WBC 12   - Consents for blood, D&C, laparoscopy/salpingectomy, and methotrexate signed  - NPO, maintenance fluids  - Tylenol PRN pain  - Continue home Keflex for pyelonephritis  - anticipate dc home with outpatient followup            Pyelonephritis    - Diagnosed at [facility] ED on 5/3  - Continue Keflex from that ED visit - 500mg BID  - Patient denies symptoms currenlty  - UA with ketones and RBC, culture pending          DISPO: dc home with outpatient follow up.    Mason Morataya MD  Obstetrics  [facility]    Pregnancy of unknown location.  Admitted for  observation  Concern for possible ectopic pregnancy  Has appropriate Beta HCG rise over last 5 days, need sequential Beta HCG on Thursday 5/10/18 and follow up USG

## 2018-05-09 NOTE — NURSING
Patient called RN to room for a clot the size of dime in the toilet during urination.  GYN resident paged and notified of clot.  Resident states Dr. Ibarra is coming to see the patient.  Patient updated.  Samara pads provided.

## 2018-05-09 NOTE — ED NOTES
Completed hourly rounding on pt. Pt lying supine in bed, AAOx4, GCS 15, calm, cooperative, responding appropriately to questions, respirations unlabored with equal bilateral chest expansion, NAD noted. Addressed pt needs. Pt denies need to use the bathroom at this time. Bed locked and in low position, call light in reach. WCTM.

## 2018-05-09 NOTE — DISCHARGE SUMMARY
"[facility]  Gynecology  Discharge Summary      Patient Name: Heydi Goodman  MRN: [id number]  Admission Date: 2018  Hospital Length of Stay: 0 days  Discharge Date and Time:  2018 8:52 AM  Attending Physician: Eloy Myles MD   Discharging Provider: Mason Morataya MD  Primary Care Provider: Clarissa Ramos MD    HPI: Heydi Goodman is a 23 y.o.  at unknown gestational age who presented as a transfer from [facility] to  ED with pregnancy of unknown location and US findings concerning for ectopic pregnancy. On presentation this morning, she had CC of severe LLQ pain and pelvic cramping with vaginal bleeding that began this morning when she woke up and was lighter than a period. At [facility] ED she was given morphine x 1. Beta-hCG was 1839. Transabdominal followed by TVUS showed uterus and ovaries WNL, endometrial stripe 0.6cm, moderate volume of free fluid in the pelvis, and a complex cystic structure identified within the left adnexa "suspicious for an ectopic pregnancy." Currently, she says vaginal bleeding is minimal or resolved, and she has no pain.     Patient was seen at the [facility] ED on 5/3/18 with CC of L sided flank/back pain and dysuria. Based on UA and physical exam, as well as h/o pyelonephritis, patient was treated for presumed pyelonephritis and discharged with cefalexin 500 mg BID. She had an incidentally found positive pregnancy test.  Beta-hCG was 339 and US showed no gestational sac. She was instructed to repeat b-hCG in 2 days. She had light spotting at home over the next 2-3 days, before waking up today with significant bleeding and cramping as described above.    PMH is significant for h/o pyelonephritis, h/o recurrent nephrolithiasis, h/o PID with chlamydia infection, and tobacco use. She has never had a PCP and does not know the etiology of her recurrent  infections.     Gynecologic history significant for long history of menometrorrhagia with very " irregular cycles and bleeding heavily for long periods of time, requiring blood transfusion in the past. Patient has tried OCPs in the past, but says she did not tolerate them. She has never tried other methods of menstrual cycle regulation or birth control.    Social history significant for recent sexual activity with >1 male partner, although the patient reports she is  to and continues to be in a relationship with a female partner.    * No surgery found *     Hospital Course:   05/08/2018 - Admitted to observation with pregnancy of unknown location/possible ectopic. PE benign, no further bleeding. Consents for blood, D&C, laparoscopy, and methotrexate therapy signed in the ED. NPO at midnight, repeat labs in AM.   05/09/2018 - Pt doing well. No nausea/vomiting, no pain, no vaginal bleeding. Stable for dc with precautions and close follow up. Instructions reviewed with patient.    Consults         Status Ordering Provider     Inpatient consult to Obstetrics  Once     Provider:  (Not yet assigned)    Completed RICH GASPAR          Final Active Diagnoses:    Diagnosis Date Noted POA    PRINCIPAL PROBLEM:  Pregnancy of unknown anatomic location [Z34.90] 05/08/2018 Not Applicable    Adnexal cyst [N94.9] 05/09/2018 Unknown    Pyelonephritis [N12] 05/09/2018 Unknown    History of acute PID [Z87.42] 05/08/2018 Not Applicable      Problems Resolved During this Admission:    Diagnosis Date Noted Date Resolved POA        Labs: All labs within the past 24 hours have been reviewed    Immunizations     None        Discharged Condition: good    Disposition: Home or Self Care    Follow Up:  Follow-up Information     Dolores Cruz MD. Schedule an appointment as soon as possible for a visit in 1 day.    Specialty:  Obstetrics and Gynecology  Why:  follow up  Contact information:  [id number]  [address] [id number] [phone]                 Patient Instructions:     hCG, quantitative    Standing Status: Future  Standing Exp. Date: 05/09/19     Diet Adult Regular     Activity as tolerated     Notify your health care provider if you experience any of the following:  temperature >100.4     Notify your health care provider if you experience any of the following:  persistent nausea and vomiting or diarrhea     Notify your health care provider if you experience any of the following:  severe uncontrolled pain     Notify your health care provider if you experience any of the following:  difficulty breathing or increased cough     Notify your health care provider if you experience any of the following:  severe persistent headache     Notify your health care provider if you experience any of the following:  worsening rash     Notify your health care provider if you experience any of the following:  persistent dizziness, light-headedness, or visual disturbances     Notify your health care provider if you experience any of the following:  increased confusion or weakness     Notify your health care provider if you experience any of the following:   Order Comments: Heavy vaginal bleeding       Medications:  Current Discharge Medication List      CONTINUE these medications which have NOT CHANGED    Details   cephALEXin (KEFLEX) 500 MG capsule Take 2 capsules (1,000 mg total) by mouth every 8 (eight) hours.  Qty: 84 capsule, Refills: 0      prenatal 21-iron fu-folic acid (PRENATAL COMPLETE) 14 mg iron- 400 mcg Tab Take 1 tablet by mouth once daily.  Qty: 30 tablet, Refills: 0      acetaminophen (TYLENOL) 500 MG tablet Take 2 tablets (1,000 mg total) by mouth 3 (three) times daily as needed for Pain.  Qty: 30 tablet, Refills: 0    Associated Diagnoses: Pyelonephritis         STOP taking these medications       ibuprofen (ADVIL,MOTRIN) 200 MG tablet Comments:   Reason for Stopping:               Mason Morataya MD  Obstetrics  [facility]      The patient is doing well this morning is having  minimal spotting and no significant cramping, her vital signs are stable; her hematocrit is 45.2 and hemoglobin is 15.3  Discussed the problem of the pregnancy of unknown location, the need for sequential beta-hCG follow-up and appropriately timed pelvic ultrasound, the possibility of an ectopic pregnancy and the potential danger of the ectopic pregnancy.  The patient states she understands the probability the problem, the potential seriousness of the problem, the need for follow-up.  She is scheduled to see a gynecologist in her area on the [facility].  She is scheduled for a follow-up beta hCG to be drawn tomorrow at the [facility] lab.  If she develops severe pain, excessive bleeding, lightheadedness, shoulder pain, dizziness or other problems she is free to get back in touch with me and our group.  Patient is ready for discharge.

## 2018-05-09 NOTE — SUBJECTIVE & OBJECTIVE
Obstetric History       T0      L0     SAB0   TAB0   Ectopic0   Multiple0   Live Births0       # Outcome Date GA Lbr Fidel/2nd Weight Sex Delivery Anes PTL Lv   1 Current                 Past Medical History:   Diagnosis Date    Gastroenteritis     Kidney infection     Kidney stone     Ovarian cyst 10/21/2013    PID (pelvic inflammatory disease) 10/21/2013     Past Surgical History:   Procedure Laterality Date    MOUTH SURGERY      NONE  10/21/2013     PTA Medications   Medication Sig    cephALEXin (KEFLEX) 500 MG capsule Take 2 capsules (1,000 mg total) by mouth every 8 (eight) hours.    prenatal 21-iron fu-folic acid (PRENATAL COMPLETE) 14 mg iron- 400 mcg Tab Take 1 tablet by mouth once daily.    acetaminophen (TYLENOL) 500 MG tablet Take 2 tablets (1,000 mg total) by mouth 3 (three) times daily as needed for Pain.    ibuprofen (ADVIL,MOTRIN) 200 MG tablet Take 200 mg by mouth every 6 (six) hours as needed for Pain.     Review of patient's allergies indicates:   Allergen Reactions    Ciprofloxacin      Patient stated that when taking this medication she burns and itches and upon getting it via IV she can see the redness travel up her arm.      Family History     None        Social History Main Topics    Smoking status: Current Every Day Smoker     Packs/day: 0.50     Types: Cigarettes    Smokeless tobacco: Never Used    Alcohol use Yes    Drug use: No    Sexual activity: Yes     Partners: Male     Birth control/ protection: None     Review of Systems   Constitutional: Negative.  Negative for activity change, chills, diaphoresis, fatigue, fever and unexpected weight change.   Respiratory: Negative for cough and shortness of breath.    Cardiovascular: Negative for chest pain and palpitations.   Gastrointestinal: Positive for constipation. Negative for abdominal pain, diarrhea, nausea and vomiting.   Genitourinary: Positive for menorrhagia, menstrual problem, pelvic pain (none  currently), vaginal bleeding and dysmenorrhea. Negative for dyspareunia, dysuria (currently resolved), flank pain (currently resolved), frequency, genital sores, hematuria, vaginal discharge, vaginal pain, urinary incontinence, postcoital bleeding and vaginal odor.   Musculoskeletal: Positive for back pain. Negative for myalgias.   Neurological: Negative for headaches.   Psychiatric/Behavioral: Negative for depression.   Breast: Negative for breast pain     Objective:     Vital Signs (Most Recent):  Temp: 98.3 °F (36.8 °C) (05/08/18 2201)  Pulse: 83 (05/08/18 2201)  Resp: 18 (05/08/18 1622)  BP: 130/71 (05/08/18 2201)  SpO2: (!) 92 % (05/08/18 2201) Vital Signs (24h Range):  Temp:  [98.1 °F (36.7 °C)-98.7 °F (37.1 °C)] 98.3 °F (36.8 °C)  Pulse:  [61-84] 83  Resp:  [18] 18  SpO2:  [92 %-100 %] 92 %  BP: (122-134)/(61-84) 130/71     Weight: 64.4 kg (142 lb)  Body mass index is 24.37 kg/m².    Physical Exam:   Constitutional: She is oriented to person, place, and time. She appears well-developed and well-nourished. No distress.    HENT:   Head: Normocephalic and atraumatic.   Mouth/Throat: Oropharynx is clear and moist.    Eyes: Conjunctivae and EOM are normal. No scleral icterus.    Neck: Neck supple.    Cardiovascular: Normal rate, regular rhythm and normal heart sounds.  Exam reveals no edema.     Pulmonary/Chest: Effort normal and breath sounds normal. No respiratory distress.        Abdominal: Soft. Bowel sounds are normal. She exhibits no distension and no mass. There is no tenderness (No tenderness to palpation on superficial or deep palpation). There is no rebound and no guarding.     Genitourinary: Vagina normal and uterus normal. No vaginal discharge found.   Genitourinary Comments: Vulva and perineum normal appearing without lesions, rash, or loss of normal structures. No blood or discharge at introitus. No masses in bilateral adnexa. Ovaries not palpated. Uterus is midline, small, firm, with smooth contour.  No CMT. Cervical os closed, cervix thick and posterior. No tenderness to bimanual palpation. Scant old blood on glove after exam. No red blood.             Musculoskeletal: Normal range of motion and moves all extremeties.       Neurological: She is alert and oriented to person, place, and time.    Skin: Skin is warm and dry. She is not diaphoretic.    Psychiatric: She has a normal mood and affect. Her behavior is normal. Judgment and thought content normal.     Significant Labs:  Lab Results   Component Value Date    GROUPTRH O POS 08/31/2011    HEPBSAG Negative 12/06/2011     CBC:   Recent Labs  Lab 05/08/18  0930   WBC 12.06   RBC 4.71   HGB 15.3   HCT 45.2      MCV 96   MCH 32.5*   MCHC 33.8     I have personallly reviewed all pertinent lab results from the last 24 hours.

## 2018-05-09 NOTE — DISCHARGE INSTRUCTIONS
Abdominal Pain and Early Pregnancy    (To rule out ectopic pregnancy or miscarriage)  Our tests show that you are pregnant, but the exact cause of your pain isnt clear.  Some pain and bleeding are common early in pregnancy. Often they stop, and you can go on to have a normal pregnancy and baby. Other times the pain or bleeding can be signs of a miscarriage or ectopic pregnancy. An ectopic pregnancy is a very serious problem. At this time it is unclear if your pregnancy will continue normally, if you will have a miscarriage, or if you could have an ectopic pregnancy. Below is some information about this.  Miscarriage  At this time we dont know whether you will have a miscarriage, or if things will clear up and your pregnancy will continue normally. We understand that this is emotionally difficult. There is little we can say to change the way you feel. But understand that miscarriages are common.  About 1 or 2 out of every 10 pregnancies end this way. Some end even before you know you are pregnant. This happens for a number of reasons, and usually we never figure out why. Its important you know that it is not your fault. It didnt happen because you did anything wrong.  Having sex or exercising does not cause a miscarriage. These activities are usually safe unless you have pain or bleeding or your doctor tells you to stop. Even minor falls wont cause a miscarriage. Miscarriages happen because things were not developing as they were supposed to. No medicine can prevent a miscarriage.  Ectopic pregnancy  In a normal pregnancy, the fertilized egg attaches to the wall of the womb (uterus). In an ectopic or tubal pregnancy, the fertilized egg attaches outside the uterus, usually in the fallopian tube. Very rarely, the egg attaches to an ovary or somewhere else in the abdomen. An ectopic pregnancy is much less common than a miscarriage, but it is very serious. The baby cannot survive, and as it grows it can rupture  the tube. This can cause internal bleeding and even death. Risk factors for an ectopic are:  · An ectopic pregnancy in the past  · Pelvic inflammatory disease, or PID  · Endometriosis  · Smoking  · An IUD  Additional tests  Because we dont know whats causing your symptoms, you will need more tests to figure out what the problem is. You may need:  Ultrasound  An ultrasound can usually find a normal pregnancy as early as 4 to 5 weeks along. If the ultrasound does not show the baby inside the uterus, it means that:  · You have a normal pregnancy less than 4 weeks along, or  · You are having or recently had a miscarriage, or  · You have an ectopic pregnancy  Quantitative HCG  This test measures the amount of a pregnancy hormone in your blood. Comparing today's test result to a repeat test in 2 days will show whether you have a normal pregnancy.  Laparoscopy  This is a type of surgery. The healthcare provider will put a tube with a light inside your belly (abdomen) to look directly at your pelvic organs. This test is used when it is not safe to wait 2 days for blood test results.  Important information  If you do have an ectopic pregnancy, there is a small chance that the growing fetus can tear the fallopian tube. This can cause severe internal bleeding. If this happens, you may have:  · Sudden severe pain in your lower abdomen  · Vaginal bleeding  · Weakness, dizziness, and sometimes fainting  If any of these symptoms occur:  · Call 911 or return immediately to the hospital.  · Do not drive yourself.  · Do not go to your healthcare provider's office or to a clinic. Go to the hospital.   Home care  Follow these guidelines to help care for yourself at home:  · Rest until your next exam. Dont do anything strenuous.  · Eat a light diet with foods that are easy to digest.  · Dont have sex until your healthcare provider says its OK.  Follow-up care  Follow up with your healthcare provider, or as advised. If you were told  to have a repeat blood test in 2 days, its important to get it done.  If you had an X-ray or ultrasound, a radiologist will review it. You will be told of any new findings that may affect your care.  Call 911  Call 911 if you have any of these:  · Severe pain and very heavy bleeding  · Severe lightheadedness, passing out, or fainting  · Rapid heart rate  · Trouble breathing  · Confused or difficulty waking up  When to seek medical advice  Call your healthcare provider right away if any of these occur:  · The pain in your abdomen gets worse, either suddenly or gradually.  · You are dizzy or weak when you stand.  · You have heavy vaginal bleeding. This means soaking 1 pad an hour for 3 hours.  · You have vaginal bleeding for more than 5 days.  · You have repeated vomiting or diarrhea.  · The pain in your abdomen moves to the lower right.  · You have blood in your vomit or bowel movements. This will be dark red or black.  · You have a fever of 100.4ºF (38ºC) or higher, or as directed by your healthcare provider  Date Last Reviewed: 10/1/2016  © 7514-6483 [employer]. [address] [place] [address], [place] [address]. All rights reserved. This information is not intended as a substitute for professional medical care. Always follow your healthcare professional's instructions.

## 2018-05-09 NOTE — ED NOTES
Completed hourly rounding on pt. Pt lying in bed, AAOx4, GCS 15, calm, cooperative, responding appropriately to questions, respirations unlabored with equal bilateral chest expansion, NAD noted. Pt recently returned from bathroom. Bed locked and in low position, call light in reach. TM.

## 2018-05-09 NOTE — ED NOTES
Pt resting in stretcher, AAOx4, GCS 15, calm, cooperative, responding appropriately to questions, visitor at bedside. Pt updated on POC, all questions answered at this time. Pt comfort needs addressed. Pt denies pain at this time. Pt connected to continuous pulse ox, BP cycling. Bed locked and in lowest position, side rails x 2, call light in reach.

## 2018-05-09 NOTE — ED NOTES
Pt resting on left side in stretcher, respirations even unlabored with equal bilateral chest expansion, in NAD, AAOx4, GCS 15, calm, cooperative, responding appropriately to questions. Visitor at bedside. Pt reconnected to continuous pulse ox, BP cuff reapplied. Pt updated on POC. Comfort needs addressed. Phone taken to nursing station for charging. Bed locked and in lowest position, side rails x 2, call light in reach. WCTM.

## 2018-05-09 NOTE — ASSESSMENT & PLAN NOTE
- TVUS with no gestational sac and complex cystic structure in L adnexa  - Patient at increased risk for ectopic due to h/o PID  - b-hcg with appropriate rise from 339 on 5/3 to 1839 5/8  - Bleeding has stopped, patient denies pain  - Cervix closed, no bleeding, bimanual exam with no tenderness  - CBC: H/H 15/45, WBC 12   - Consents for blood, D&C, laparoscopy/salpingectomy, and methotrexate signed  - NPO, maintenance fluids  - Tylenol PRN pain  - Continue home Keflex for pyelonephritis  - anticipate dc home with outpatient followup

## 2018-05-10 ENCOUNTER — TELEPHONE (OUTPATIENT)
Dept: OBSTETRICS AND GYNECOLOGY | Facility: CLINIC | Age: 23
End: 2018-05-10

## 2018-05-10 ENCOUNTER — INITIAL PRENATAL (OUTPATIENT)
Dept: OBSTETRICS AND GYNECOLOGY | Facility: CLINIC | Age: 23
End: 2018-05-10
Payer: MEDICAID

## 2018-05-10 ENCOUNTER — LAB VISIT (OUTPATIENT)
Dept: LAB | Facility: OTHER | Age: 23
End: 2018-05-10
Payer: MEDICAID

## 2018-05-10 VITALS
WEIGHT: 140.19 LBS | SYSTOLIC BLOOD PRESSURE: 120 MMHG | DIASTOLIC BLOOD PRESSURE: 70 MMHG | BODY MASS INDEX: 24.07 KG/M2

## 2018-05-10 DIAGNOSIS — O20.9 VAGINAL BLEEDING AFFECTING EARLY PREGNANCY: ICD-10-CM

## 2018-05-10 DIAGNOSIS — Z32.01 POSITIVE PREGNANCY TEST: Primary | ICD-10-CM

## 2018-05-10 DIAGNOSIS — O36.80X0 PREGNANCY OF UNKNOWN ANATOMIC LOCATION: ICD-10-CM

## 2018-05-10 LAB — HCG INTACT+B SERPL-ACNC: 3011 MIU/ML

## 2018-05-10 PROCEDURE — 99214 OFFICE O/P EST MOD 30 MIN: CPT | Mod: S$PBB,TH,, | Performed by: OBSTETRICS & GYNECOLOGY

## 2018-05-10 PROCEDURE — 99999 PR PBB SHADOW E&M-EST. PATIENT-LVL II: CPT | Mod: PBBFAC,,, | Performed by: OBSTETRICS & GYNECOLOGY

## 2018-05-10 PROCEDURE — 88175 CYTOPATH C/V AUTO FLUID REDO: CPT

## 2018-05-10 PROCEDURE — 87491 CHLMYD TRACH DNA AMP PROBE: CPT

## 2018-05-10 PROCEDURE — 99212 OFFICE O/P EST SF 10 MIN: CPT | Mod: PBBFAC,TH | Performed by: OBSTETRICS & GYNECOLOGY

## 2018-05-10 PROCEDURE — 84702 CHORIONIC GONADOTROPIN TEST: CPT

## 2018-05-10 PROCEDURE — 87086 URINE CULTURE/COLONY COUNT: CPT

## 2018-05-10 PROCEDURE — 36415 COLL VENOUS BLD VENIPUNCTURE: CPT

## 2018-05-10 NOTE — PROGRESS NOTES
Chief Complaint   Patient presents with    Initial Prenatal Visit       HPI:  23 y.o. female     No LMP recorded (lmp unknown). Patient is pregnant.    - 5/3/2018 HCG=339 U/S: no IUP; complex cyst, likely corpus luteum, in the left adnexa  - 2018 HCG=1839 U/S: no IUPd; complex mass in left adnexa    - Seen in ED on 5/3/2018 for flank pain and found to be incidentally pregnant; treated with keflex  - Returned to ED on 2018 with vaginal bleeding  - Admitted at [facility] from 2018 to 2018 for serial abdominal exams  - Exam and bleeding improved, and HCG beryl appropriately    Contraception: NONE  Pap: No result found, NO RECENT DOCUMENTED PAP  Mammogram: N/A    Past Medical History:   Diagnosis Date    Gastroenteritis     Kidney infection     Kidney stone     Ovarian cyst 10/21/2013    PID (pelvic inflammatory disease) 10/21/2013     Past Surgical History:   Procedure Laterality Date    MOUTH SURGERY      NONE  10/21/2013       Social History   Substance Use Topics    Smoking status: Current Every Day Smoker     Packs/day: 0.50     Types: Cigarettes    Smokeless tobacco: Never Used    Alcohol use Yes     Family History   Problem Relation Age of Onset    Diabetes Neg Hx     Hypertension Neg Hx     Stroke Neg Hx     Heart disease Neg Hx      OB History    Para Term  AB Living   1             SAB TAB Ectopic Multiple Live Births                  # Outcome Date GA Lbr Fidel/2nd Weight Sex Delivery Anes PTL Lv   1 Current                   MEDICATIONS: Reviewed with patient.  ALLERGIES: Ciprofloxacin     ROS:  Review of Systems   Constitutional: Negative for fever.   Respiratory: Negative for shortness of breath.    Cardiovascular: Negative for chest pain.   Gastrointestinal: Negative for abdominal pain, constipation, nausea and vomiting.   Genitourinary: Positive for pelvic pain and vaginal bleeding. Negative for vaginal discharge.   Neurological: Negative for headaches.        PHYSICAL EXAM:    /70   Wt 63.6 kg (140 lb 3.4 oz)   LMP  (LMP Unknown)   BMI 24.07 kg/m²     Physical Exam:   Constitutional: She is oriented to person, place, and time. She appears well-developed.    HENT:   Head: Normocephalic.       Pulmonary/Chest: Effort normal.        Abdominal: She exhibits no mass. There is no hepatosplenomegaly. There is no tenderness.     Genitourinary: Uterus is not enlarged and not tender. Cervix is normal. Right adnexum displays no tenderness and no fullness. Left adnexum displays no tenderness and no fullness. There is bleeding in the vagina. No vaginal discharge found.   Genitourinary Comments: External genitalia: Normal  Urethra: No tenderness; normal meatus  Bladder: No tenderness               Neurological: She is alert and oriented to person, place, and time.     Psychiatric: She has a normal mood and affect.         ASSESSMENT & PLAN:   Positive pregnancy test  -     hCG, quantitative; Future; Expected date: 05/10/2018  -     US Pelvis Complete Non OB; Future; Expected date: 05/17/2018  -     Type & Screen  -     Liquid-based pap smear, screening  -     C. trachomatis/N. gonorrhoeae by AMP DNA Cervix  -     Urine culture    Vaginal bleeding affecting early pregnancy  -     hCG, quantitative; Future; Expected date: 05/10/2018  -     US Pelvis Complete Non OB; Future; Expected date: 05/17/2018  -     Type & Screen          - Old blood in vault on exam  - No tenderness on exam  - Repeat HCG drawn this morning; result pending  - Repeat HCG on Monday; repeat u/s on Wednesday; return to clinic on Thursday  - Strict pain and bleeding precautions given

## 2018-05-10 NOTE — TELEPHONE ENCOUNTER
----- Message from Hanna Seay sent at 5/10/2018 11:39 AM CDT -----            Name of Who is Calling: gianni      What is the request in detail: pt.would like to know can dr. york prescribe stool softener.      Can the clinic reply by [facility]: no      What Number to Call Back [facility]: [phone]

## 2018-05-12 LAB — BACTERIA UR CULT: NO GROWTH

## 2018-05-13 LAB
C TRACH DNA SPEC QL NAA+PROBE: NOT DETECTED
N GONORRHOEA DNA SPEC QL NAA+PROBE: NOT DETECTED

## 2018-05-14 ENCOUNTER — TELEPHONE (OUTPATIENT)
Dept: OBSTETRICS AND GYNECOLOGY | Facility: CLINIC | Age: 23
End: 2018-05-14

## 2018-05-14 ENCOUNTER — HOSPITAL ENCOUNTER (OUTPATIENT)
Dept: RADIOLOGY | Facility: OTHER | Age: 23
Discharge: HOME | End: 2018-05-14
Attending: OBSTETRICS & GYNECOLOGY
Payer: MEDICAID

## 2018-05-14 ENCOUNTER — ROUTINE PRENATAL (OUTPATIENT)
Dept: OBSTETRICS AND GYNECOLOGY | Facility: CLINIC | Age: 23
End: 2018-05-14
Payer: MEDICAID

## 2018-05-14 ENCOUNTER — HOSPITAL ENCOUNTER (OUTPATIENT)
Facility: OTHER | Age: 23
Discharge: HOME | End: 2018-05-15
Attending: OBSTETRICS & GYNECOLOGY | Admitting: OBSTETRICS & GYNECOLOGY
Payer: MEDICAID

## 2018-05-14 VITALS
WEIGHT: 138.88 LBS | BODY MASS INDEX: 23.84 KG/M2 | DIASTOLIC BLOOD PRESSURE: 70 MMHG | SYSTOLIC BLOOD PRESSURE: 120 MMHG

## 2018-05-14 DIAGNOSIS — Z98.890 S/P DILATION AND CURETTAGE: ICD-10-CM

## 2018-05-14 DIAGNOSIS — O36.80X0 PREGNANCY OF UNKNOWN ANATOMIC LOCATION: Primary | ICD-10-CM

## 2018-05-14 DIAGNOSIS — O00.102 LEFT TUBAL PREGNANCY WITHOUT INTRAUTERINE PREGNANCY: Primary | ICD-10-CM

## 2018-05-14 DIAGNOSIS — R10.2 FEMALE PELVIC PAIN: ICD-10-CM

## 2018-05-14 DIAGNOSIS — O20.9 VAGINAL BLEEDING AFFECTING EARLY PREGNANCY: ICD-10-CM

## 2018-05-14 DIAGNOSIS — O20.9 BLEEDING IN EARLY PREGNANCY: ICD-10-CM

## 2018-05-14 DIAGNOSIS — Z32.01 POSITIVE PREGNANCY TEST: ICD-10-CM

## 2018-05-14 DIAGNOSIS — O00.90 ECTOPIC PREGNANCY: ICD-10-CM

## 2018-05-14 PROCEDURE — 76817 TRANSVAGINAL US OBSTETRIC: CPT | Mod: 26,,, | Performed by: RADIOLOGY

## 2018-05-14 PROCEDURE — 25000003 PHARM REV CODE 250: Performed by: STUDENT IN AN ORGANIZED HEALTH CARE EDUCATION/TRAINING PROGRAM

## 2018-05-14 PROCEDURE — G0379 DIRECT REFER HOSPITAL OBSERV: HCPCS

## 2018-05-14 PROCEDURE — 76801 OB US < 14 WKS SINGLE FETUS: CPT | Mod: 26,,, | Performed by: RADIOLOGY

## 2018-05-14 PROCEDURE — 99999 PR PBB SHADOW E&M-EST. PATIENT-LVL II: CPT | Mod: PBBFAC,,, | Performed by: OBSTETRICS & GYNECOLOGY

## 2018-05-14 PROCEDURE — 76801 OB US < 14 WKS SINGLE FETUS: CPT | Mod: TC

## 2018-05-14 PROCEDURE — G0378 HOSPITAL OBSERVATION PER HR: HCPCS

## 2018-05-14 PROCEDURE — 99212 OFFICE O/P EST SF 10 MIN: CPT | Mod: PBBFAC,25,TH | Performed by: OBSTETRICS & GYNECOLOGY

## 2018-05-14 PROCEDURE — 99214 OFFICE O/P EST MOD 30 MIN: CPT | Mod: S$PBB,TH,, | Performed by: OBSTETRICS & GYNECOLOGY

## 2018-05-14 RX ORDER — ACETAMINOPHEN 325 MG/1
650 TABLET ORAL EVERY 4 HOURS PRN
Status: DISCONTINUED | OUTPATIENT
Start: 2018-05-14 | End: 2018-05-16 | Stop reason: HOSPADM

## 2018-05-14 RX ORDER — ONDANSETRON 8 MG/1
8 TABLET, ORALLY DISINTEGRATING ORAL EVERY 8 HOURS PRN
Status: DISCONTINUED | OUTPATIENT
Start: 2018-05-14 | End: 2018-05-15 | Stop reason: SDUPTHER

## 2018-05-14 RX ORDER — DOCUSATE SODIUM 100 MG/1
100 CAPSULE, LIQUID FILLED ORAL 2 TIMES DAILY
COMMUNITY
End: 2020-08-05

## 2018-05-14 RX ORDER — SODIUM CHLORIDE 0.9 % (FLUSH) 0.9 %
3 SYRINGE (ML) INJECTION
Status: DISCONTINUED | OUTPATIENT
Start: 2018-05-14 | End: 2018-05-16 | Stop reason: HOSPADM

## 2018-05-14 RX ORDER — HYDROCODONE BITARTRATE AND ACETAMINOPHEN 10; 325 MG/1; MG/1
1 TABLET ORAL EVERY 4 HOURS PRN
Status: DISCONTINUED | OUTPATIENT
Start: 2018-05-14 | End: 2018-05-14

## 2018-05-14 RX ORDER — HYDROCODONE BITARTRATE AND ACETAMINOPHEN 5; 325 MG/1; MG/1
1 TABLET ORAL EVERY 4 HOURS PRN
Status: DISCONTINUED | OUTPATIENT
Start: 2018-05-14 | End: 2018-05-16 | Stop reason: HOSPADM

## 2018-05-14 RX ADMIN — HYDROCODONE BITARTRATE AND ACETAMINOPHEN 1 TABLET: 10; 325 TABLET ORAL at 08:05

## 2018-05-14 NOTE — PROGRESS NOTES
Chief Complaint   Patient presents with    Follow-up     u/s       HPI:  23 y.o. female     No LMP recorded (lmp unknown). Patient is pregnant.     - Patient returns today with c/o worsening pain, particularly sharp pain in her left pelvis  - She continues to note some vaginal bleeding    - 5/3/2018 HCG=339    U/S: no IUP; complex cyst, likely corpus luteum, in the left adnexa  - 2018 HCG=1839    U/S: no IUP; complex mass in left adnexa  - 5/10/2018 HCG=3011  - 2018 HCG=pending    U/S: no IUP; complex mass in the left adnexa, separate from uterus and ovary, concerning for ectopic (verbal report)    - Seen in ED on 5/3/2018 for flank pain and found to be incidentally pregnant; treated with keflex  - Returned to ED on 2018 with vaginal bleeding  - Admitted at [facility] from 2018 to 2018 for serial abdominal exams  - Exam and bleeding improved, and HCG beryl appropriately    Contraception: NONE  Pap: 2018, Pending  Mammogram: N/A    Past Medical History:   Diagnosis Date    Gastroenteritis     Kidney infection     Kidney stone     Ovarian cyst 10/21/2013    PID (pelvic inflammatory disease) 10/21/2013     Past Surgical History:   Procedure Laterality Date    MOUTH SURGERY      NONE  10/21/2013       Social History   Substance Use Topics    Smoking status: Current Every Day Smoker     Packs/day: 0.50     Types: Cigarettes    Smokeless tobacco: Never Used    Alcohol use Yes     Family History   Problem Relation Age of Onset    Diabetes Neg Hx     Hypertension Neg Hx     Stroke Neg Hx     Heart disease Neg Hx      OB History    Para Term  AB Living   1             SAB TAB Ectopic Multiple Live Births                  # Outcome Date GA Lbr Fidel/2nd Weight Sex Delivery Anes PTL Lv   1 Current                   MEDICATIONS: Reviewed with patient.  ALLERGIES: Ciprofloxacin     ROS:  Review of Systems   Constitutional: Negative for fever.   Respiratory: Negative for  shortness of breath.    Cardiovascular: Negative for chest pain.   Gastrointestinal: Negative for abdominal pain, constipation, nausea and vomiting.   Genitourinary: Positive for pelvic pain and vaginal bleeding. Negative for vaginal discharge.   Neurological: Negative for headaches.       PHYSICAL EXAM:    /70   Wt 63 kg (138 lb 14.2 oz)   LMP  (LMP Unknown)   BMI 23.84 kg/m²     Physical Exam:   Constitutional: She is oriented to person, place, and time. She appears well-developed.    HENT:   Head: Normocephalic.       Pulmonary/Chest: Effort normal.        Abdominal: She exhibits no mass. There is no hepatosplenomegaly. There is no tenderness.     Genitourinary: Uterus is not enlarged and not tender. Cervix is normal. Right adnexum displays no tenderness and no fullness. Left adnexum displays no tenderness and no fullness. There is bleeding in the vagina. No vaginal discharge found.   Genitourinary Comments: External genitalia: Normal  Urethra: No tenderness; normal meatus  Bladder: No tenderness               Neurological: She is alert and oriented to person, place, and time.     Psychiatric: She has a normal mood and affect.         ASSESSMENT & PLAN:   Female pelvic pain    Bleeding in early pregnancy  -     US Pelvis Complete Non OB; Future; Expected date: 05/14/2018          - Reviewed u/s images and spoke with Radiology  - Likely left ectopic pregnancy  - HCG pending  - Recommended admission to hospital  - Counseled patient on management options including D&C with methotrexate therapy versus laparoscopic salpingectomy  - Counseled patient on increased risk of failure of ectopic therapy when HCG level is above 5000  - If HCG is less than 5000, will plan for suction D&C with frozen path followed by methotrexate  - If HCG is greater than 5000, will plan for diagnostic laparoscopy with possible salpingectomy    - Patient plans to leave for an hour to pick-up her   - Strongly recommended direct  admission.  Patient understands risk of rupture and internal bleeding.  Against medical advice, she plans to delay admission until later this evening.  - Will arrange for direct admission this evening  - Patient given strict precautions to proceed to the hospital immediately for worsening pain

## 2018-05-14 NOTE — TELEPHONE ENCOUNTER
----- Message from Kendra Spencer sent at 5/14/2018  9:40 AM CDT -----  Contact: RICARDO GUPTA [[id number]]            Name of Who is Calling: RICARDO GUPTA [[id number]]      What is the request in detail: pt would like to discuss side pain she is experiencing. Please call      Can the clinic reply by [facility]: no    [facility] [phone]

## 2018-05-14 NOTE — TELEPHONE ENCOUNTER
Returned pt call and pt stated that she is having side pains. Scheduled pt 5/14/2018 for appt. Pt verbalized understanding

## 2018-05-15 ENCOUNTER — ANESTHESIA EVENT (OUTPATIENT)
Dept: SURGERY | Facility: OTHER | Age: 23
End: 2018-05-15
Payer: MEDICAID

## 2018-05-15 ENCOUNTER — ANESTHESIA (OUTPATIENT)
Dept: SURGERY | Facility: OTHER | Age: 23
End: 2018-05-15
Payer: MEDICAID

## 2018-05-15 VITALS
WEIGHT: 138.88 LBS | HEIGHT: 64 IN | DIASTOLIC BLOOD PRESSURE: 68 MMHG | RESPIRATION RATE: 18 BRPM | HEART RATE: 71 BPM | BODY MASS INDEX: 23.71 KG/M2 | SYSTOLIC BLOOD PRESSURE: 112 MMHG | TEMPERATURE: 99 F | OXYGEN SATURATION: 99 %

## 2018-05-15 PROBLEM — Z98.890 S/P DILATION AND CURETTAGE: Status: ACTIVE | Noted: 2018-05-15

## 2018-05-15 LAB
ALBUMIN SERPL BCP-MCNC: 3.7 G/DL
ALP SERPL-CCNC: 68 U/L
ALT SERPL W/O P-5'-P-CCNC: 21 U/L
ANION GAP SERPL CALC-SCNC: 7 MMOL/L
AST SERPL-CCNC: 17 U/L
BILIRUB SERPL-MCNC: 0.4 MG/DL
BUN SERPL-MCNC: 11 MG/DL
CALCIUM SERPL-MCNC: 9.2 MG/DL
CHLORIDE SERPL-SCNC: 105 MMOL/L
CO2 SERPL-SCNC: 25 MMOL/L
CREAT SERPL-MCNC: 0.7 MG/DL
EST. GFR  (AFRICAN AMERICAN): >60 ML/MIN/1.73 M^2
EST. GFR  (NON AFRICAN AMERICAN): >60 ML/MIN/1.73 M^2
GLUCOSE SERPL-MCNC: 88 MG/DL
POTASSIUM SERPL-SCNC: 4.1 MMOL/L
PROT SERPL-MCNC: 6.6 G/DL
SODIUM SERPL-SCNC: 137 MMOL/L

## 2018-05-15 PROCEDURE — 00840 ANES IPER PX LOWER ABD NOS: CPT | Performed by: OBSTETRICS & GYNECOLOGY

## 2018-05-15 PROCEDURE — 37000008 HC ANESTHESIA 1ST 15 MINUTES: Performed by: OBSTETRICS & GYNECOLOGY

## 2018-05-15 PROCEDURE — G0378 HOSPITAL OBSERVATION PER HR: HCPCS

## 2018-05-15 PROCEDURE — 63600175 PHARM REV CODE 636 W HCPCS: Performed by: NURSE ANESTHETIST, CERTIFIED REGISTERED

## 2018-05-15 PROCEDURE — 36415 COLL VENOUS BLD VENIPUNCTURE: CPT

## 2018-05-15 PROCEDURE — 36000704 HC OR TIME LEV I 1ST 15 MIN: Performed by: OBSTETRICS & GYNECOLOGY

## 2018-05-15 PROCEDURE — 58120 DILATION AND CURETTAGE: CPT | Mod: ,,, | Performed by: OBSTETRICS & GYNECOLOGY

## 2018-05-15 PROCEDURE — 88305 TISSUE EXAM BY PATHOLOGIST: CPT | Performed by: PATHOLOGY

## 2018-05-15 PROCEDURE — 88331 PATH CONSLTJ SURG 1 BLK 1SPC: CPT | Mod: 26,,, | Performed by: PATHOLOGY

## 2018-05-15 PROCEDURE — 80053 COMPREHEN METABOLIC PANEL: CPT

## 2018-05-15 PROCEDURE — 88331 PATH CONSLTJ SURG 1 BLK 1SPC: CPT | Performed by: PATHOLOGY

## 2018-05-15 PROCEDURE — 25000003 PHARM REV CODE 250: Performed by: STUDENT IN AN ORGANIZED HEALTH CARE EDUCATION/TRAINING PROGRAM

## 2018-05-15 PROCEDURE — 63600175 PHARM REV CODE 636 W HCPCS: Performed by: OBSTETRICS & GYNECOLOGY

## 2018-05-15 PROCEDURE — 88305 TISSUE EXAM BY PATHOLOGIST: CPT | Mod: 26,,, | Performed by: PATHOLOGY

## 2018-05-15 PROCEDURE — 99900035 HC TECH TIME PER 15 MIN (STAT)

## 2018-05-15 PROCEDURE — 63600175 PHARM REV CODE 636 W HCPCS: Performed by: ANESTHESIOLOGY

## 2018-05-15 PROCEDURE — 71000033 HC RECOVERY, INTIAL HOUR: Performed by: OBSTETRICS & GYNECOLOGY

## 2018-05-15 PROCEDURE — 36000705 HC OR TIME LEV I EA ADD 15 MIN: Performed by: OBSTETRICS & GYNECOLOGY

## 2018-05-15 PROCEDURE — 25000003 PHARM REV CODE 250: Performed by: NURSE ANESTHETIST, CERTIFIED REGISTERED

## 2018-05-15 PROCEDURE — 37000009 HC ANESTHESIA EA ADD 15 MINS: Performed by: OBSTETRICS & GYNECOLOGY

## 2018-05-15 PROCEDURE — 25000003 PHARM REV CODE 250: Performed by: ANESTHESIOLOGY

## 2018-05-15 RX ORDER — OXYCODONE HYDROCHLORIDE 5 MG/1
5 TABLET ORAL
Status: DISCONTINUED | OUTPATIENT
Start: 2018-05-15 | End: 2018-05-15 | Stop reason: HOSPADM

## 2018-05-15 RX ORDER — SODIUM CHLORIDE 0.9 % (FLUSH) 0.9 %
3 SYRINGE (ML) INJECTION
Status: DISCONTINUED | OUTPATIENT
Start: 2018-05-15 | End: 2018-05-16 | Stop reason: HOSPADM

## 2018-05-15 RX ORDER — HYDROMORPHONE HYDROCHLORIDE 2 MG/ML
0.4 INJECTION, SOLUTION INTRAMUSCULAR; INTRAVENOUS; SUBCUTANEOUS EVERY 5 MIN PRN
Status: DISCONTINUED | OUTPATIENT
Start: 2018-05-15 | End: 2018-05-15 | Stop reason: HOSPADM

## 2018-05-15 RX ORDER — PROPOFOL 10 MG/ML
VIAL (ML) INTRAVENOUS
Status: DISCONTINUED | OUTPATIENT
Start: 2018-05-15 | End: 2018-05-15

## 2018-05-15 RX ORDER — ONDANSETRON 2 MG/ML
4 INJECTION INTRAMUSCULAR; INTRAVENOUS DAILY PRN
Status: DISCONTINUED | OUTPATIENT
Start: 2018-05-15 | End: 2018-05-15 | Stop reason: HOSPADM

## 2018-05-15 RX ORDER — IBUPROFEN 600 MG/1
600 TABLET ORAL EVERY 6 HOURS PRN
Status: DISCONTINUED | OUTPATIENT
Start: 2018-05-15 | End: 2018-05-16 | Stop reason: HOSPADM

## 2018-05-15 RX ORDER — LIDOCAINE HCL/PF 100 MG/5ML
SYRINGE (ML) INTRAVENOUS
Status: DISCONTINUED | OUTPATIENT
Start: 2018-05-15 | End: 2018-05-15

## 2018-05-15 RX ORDER — ONDANSETRON 8 MG/1
8 TABLET, ORALLY DISINTEGRATING ORAL EVERY 8 HOURS PRN
Status: DISCONTINUED | OUTPATIENT
Start: 2018-05-15 | End: 2018-05-15 | Stop reason: SDUPTHER

## 2018-05-15 RX ORDER — FENTANYL CITRATE 50 UG/ML
INJECTION, SOLUTION INTRAMUSCULAR; INTRAVENOUS
Status: DISCONTINUED | OUTPATIENT
Start: 2018-05-15 | End: 2018-05-15

## 2018-05-15 RX ORDER — HYDROCODONE BITARTRATE AND ACETAMINOPHEN 5; 325 MG/1; MG/1
1 TABLET ORAL EVERY 4 HOURS PRN
Qty: 14 TABLET | Refills: 0 | Status: SHIPPED | OUTPATIENT
Start: 2018-05-15 | End: 2020-08-05 | Stop reason: ALTCHOICE

## 2018-05-15 RX ORDER — ONDANSETRON 2 MG/ML
4 INJECTION INTRAMUSCULAR; INTRAVENOUS DAILY PRN
Status: DISCONTINUED | OUTPATIENT
Start: 2018-05-15 | End: 2018-05-15 | Stop reason: SDUPTHER

## 2018-05-15 RX ORDER — OXYCODONE HYDROCHLORIDE 5 MG/1
5 TABLET ORAL
Status: DISCONTINUED | OUTPATIENT
Start: 2018-05-15 | End: 2018-05-15 | Stop reason: SDUPTHER

## 2018-05-15 RX ORDER — METHOTREXATE 25 MG/ML
50 INJECTION INTRA-ARTERIAL; INTRAMUSCULAR; INTRATHECAL; INTRAVENOUS ONCE
Status: COMPLETED | OUTPATIENT
Start: 2018-05-15 | End: 2018-05-15

## 2018-05-15 RX ORDER — FENTANYL CITRATE 50 UG/ML
25 INJECTION, SOLUTION INTRAMUSCULAR; INTRAVENOUS EVERY 5 MIN PRN
Status: DISCONTINUED | OUTPATIENT
Start: 2018-05-15 | End: 2018-05-15 | Stop reason: SDUPTHER

## 2018-05-15 RX ORDER — HYDROMORPHONE HYDROCHLORIDE 2 MG/ML
0.4 INJECTION, SOLUTION INTRAMUSCULAR; INTRAVENOUS; SUBCUTANEOUS EVERY 5 MIN PRN
Status: DISCONTINUED | OUTPATIENT
Start: 2018-05-15 | End: 2018-05-15 | Stop reason: SDUPTHER

## 2018-05-15 RX ORDER — MIDAZOLAM HYDROCHLORIDE 1 MG/ML
INJECTION INTRAMUSCULAR; INTRAVENOUS
Status: DISCONTINUED | OUTPATIENT
Start: 2018-05-15 | End: 2018-05-15

## 2018-05-15 RX ORDER — HYDROCODONE BITARTRATE AND ACETAMINOPHEN 5; 325 MG/1; MG/1
1 TABLET ORAL EVERY 4 HOURS PRN
Status: DISCONTINUED | OUTPATIENT
Start: 2018-05-15 | End: 2018-05-16 | Stop reason: HOSPADM

## 2018-05-15 RX ORDER — DIPHENHYDRAMINE HYDROCHLORIDE 50 MG/ML
25 INJECTION INTRAMUSCULAR; INTRAVENOUS EVERY 4 HOURS PRN
Status: DISCONTINUED | OUTPATIENT
Start: 2018-05-15 | End: 2018-05-16 | Stop reason: HOSPADM

## 2018-05-15 RX ORDER — DIPHENHYDRAMINE HCL 25 MG
25 CAPSULE ORAL EVERY 4 HOURS PRN
Status: DISCONTINUED | OUTPATIENT
Start: 2018-05-15 | End: 2018-05-16 | Stop reason: HOSPADM

## 2018-05-15 RX ORDER — SODIUM CHLORIDE, SODIUM LACTATE, POTASSIUM CHLORIDE, CALCIUM CHLORIDE 600; 310; 30; 20 MG/100ML; MG/100ML; MG/100ML; MG/100ML
INJECTION, SOLUTION INTRAVENOUS CONTINUOUS PRN
Status: DISCONTINUED | OUTPATIENT
Start: 2018-05-15 | End: 2018-05-15

## 2018-05-15 RX ORDER — MEPERIDINE HYDROCHLORIDE 50 MG/ML
12.5 INJECTION INTRAMUSCULAR; INTRAVENOUS; SUBCUTANEOUS ONCE AS NEEDED
Status: DISCONTINUED | OUTPATIENT
Start: 2018-05-15 | End: 2018-05-15 | Stop reason: HOSPADM

## 2018-05-15 RX ORDER — IBUPROFEN 600 MG/1
600 TABLET ORAL EVERY 8 HOURS PRN
Qty: 30 TABLET | Refills: 1 | Status: SHIPPED | OUTPATIENT
Start: 2018-05-15 | End: 2020-08-05

## 2018-05-15 RX ORDER — ONDANSETRON 8 MG/1
8 TABLET, ORALLY DISINTEGRATING ORAL EVERY 8 HOURS PRN
Status: DISCONTINUED | OUTPATIENT
Start: 2018-05-15 | End: 2018-05-16 | Stop reason: HOSPADM

## 2018-05-15 RX ORDER — ONDANSETRON 2 MG/ML
INJECTION INTRAMUSCULAR; INTRAVENOUS
Status: DISCONTINUED | OUTPATIENT
Start: 2018-05-15 | End: 2018-05-15

## 2018-05-15 RX ORDER — FENTANYL CITRATE 50 UG/ML
25 INJECTION, SOLUTION INTRAMUSCULAR; INTRAVENOUS EVERY 5 MIN PRN
Status: COMPLETED | OUTPATIENT
Start: 2018-05-15 | End: 2018-05-15

## 2018-05-15 RX ORDER — MEPERIDINE HYDROCHLORIDE 50 MG/ML
12.5 INJECTION INTRAMUSCULAR; INTRAVENOUS; SUBCUTANEOUS ONCE AS NEEDED
Status: DISCONTINUED | OUTPATIENT
Start: 2018-05-15 | End: 2018-05-15 | Stop reason: SDUPTHER

## 2018-05-15 RX ADMIN — HYDROCODONE BITARTRATE AND ACETAMINOPHEN 1 TABLET: 5; 325 TABLET ORAL at 08:05

## 2018-05-15 RX ADMIN — FENTANYL CITRATE 25 MCG: 50 INJECTION, SOLUTION INTRAMUSCULAR; INTRAVENOUS at 02:05

## 2018-05-15 RX ADMIN — MIDAZOLAM HYDROCHLORIDE 2 MG: 1 INJECTION, SOLUTION INTRAMUSCULAR; INTRAVENOUS at 01:05

## 2018-05-15 RX ADMIN — LIDOCAINE HYDROCHLORIDE 75 MG: 20 INJECTION, SOLUTION INTRAVENOUS at 01:05

## 2018-05-15 RX ADMIN — METHOTREXATE 85 MG: 25 INJECTION, SOLUTION INTRA-ARTERIAL; INTRAMUSCULAR; INTRATHECAL; INTRAVENOUS at 08:05

## 2018-05-15 RX ADMIN — ONDANSETRON 4 MG: 2 INJECTION INTRAMUSCULAR; INTRAVENOUS at 01:05

## 2018-05-15 RX ADMIN — HYDROCODONE BITARTRATE AND ACETAMINOPHEN 1 TABLET: 5; 325 TABLET ORAL at 09:05

## 2018-05-15 RX ADMIN — HYDROCODONE BITARTRATE AND ACETAMINOPHEN 1 TABLET: 5; 325 TABLET ORAL at 12:05

## 2018-05-15 RX ADMIN — PROPOFOL 200 MG: 10 INJECTION, EMULSION INTRAVENOUS at 01:05

## 2018-05-15 RX ADMIN — FENTANYL CITRATE 100 MCG: 50 INJECTION, SOLUTION INTRAMUSCULAR; INTRAVENOUS at 01:05

## 2018-05-15 RX ADMIN — CARBOXYMETHYLCELLULOSE SODIUM 2 DROP: 2.5 SOLUTION/ DROPS OPHTHALMIC at 01:05

## 2018-05-15 RX ADMIN — OXYCODONE HYDROCHLORIDE 5 MG: 5 TABLET ORAL at 02:05

## 2018-05-15 RX ADMIN — HYDROCODONE BITARTRATE AND ACETAMINOPHEN 1 TABLET: 5; 325 TABLET ORAL at 05:05

## 2018-05-15 RX ADMIN — SODIUM CHLORIDE, SODIUM LACTATE, POTASSIUM CHLORIDE, AND CALCIUM CHLORIDE: 600; 310; 30; 20 INJECTION, SOLUTION INTRAVENOUS at 01:05

## 2018-05-15 NOTE — TRANSFER OF CARE
"Anesthesia Transfer of Care Note    Patient: Heydi Goodman    Procedure(s) Performed: Procedure(s) (LRB):  DILATION-CURETTAGE OF UTERUS (D AND C) (N/A)    Patient location: PACU    Anesthesia Type: general    Transport from OR: Transported from OR on room air with adequate spontaneous ventilation    Post pain: adequate analgesia    Post assessment: no apparent anesthetic complications    Post vital signs: stable    Level of consciousness: awake    Nausea/Vomiting: no nausea/vomiting    Complications: none    Transfer of care protocol was followed      Last vitals:   Visit Vitals  BP (!) 112/55 (BP Location: Right arm, Patient Position: Lying)   Pulse 72   Temp 36.6 °C (97.9 °F) (Oral)   Resp 18   Ht 5' 4" (1.626 m)   Wt 63 kg (138 lb 14.2 oz)   LMP  (Within Months)   SpO2 98%   Breastfeeding? No   BMI 23.84 kg/m²     "

## 2018-05-15 NOTE — INTERVAL H&P NOTE
The patient has been examined and the H&P has been reviewed:    I concur with the findings and no changes have occurred since H&P was written.    Surgery risks, benefits and alternative options discussed and understood by patient/family.          Active Hospital Problems    Diagnosis  POA    *Ectopic pregnancy [O00.90]  Yes      Resolved Hospital Problems    Diagnosis Date Resolved POA   No resolved problems to display.

## 2018-05-15 NOTE — HOSPITAL COURSE
05/14/2018 Admitted for observation and planned surgical procedure for suspected ectopic pregnancy.

## 2018-05-15 NOTE — ASSESSMENT & PLAN NOTE
- Beta decreased from  3011 to 2262  - Patient is good candidate for MTX given beta levels and ectopic size  - CMP shows normal liver function  - benign abdominal exam  - To OR for d&c today, plan for MTX therapy after

## 2018-05-15 NOTE — PROGRESS NOTES
[facility]  Obstetrics  Postpartum Progress Note    Patient Name: Heydi Goodman  MRN: [id number]  Admission Date: 5/14/2018  Hospital Length of Stay: 0 days  Attending Physician: DEON Morris MD  Primary Care Provider: Dolores Cruz MD    Subjective:     Principal Problem:Ectopic pregnancy    Hospital course: 05/14/2018 Admitted for observation and planned surgical procedure for suspected ectopic pregnancy.    Interval History: Pt is doing well today. Denies pain. No nausea/vomiting. Tolerating regular diet (now NPO since midnight) - ambulating without difficulty. Denies vaginal bleeding.    Objective:     Vital Signs (Most Recent):  Temp: 98.4 °F (36.9 °C) (05/15/18 0355)  Pulse: 65 (05/15/18 0355)  Resp: 18 (05/15/18 0355)  BP: (!) 94/44 (05/15/18 0355)  SpO2: 98 % (05/15/18 0355) Vital Signs (24h Range):  Temp:  [98.4 °F (36.9 °C)] 98.4 °F (36.9 °C)  Pulse:  [] 65  Resp:  [16-18] 18  SpO2:  [96 %-98 %] 98 %  BP: ()/(44-84) 94/44     Weight: 63 kg (138 lb 14.2 oz)  Body mass index is 23.84 kg/m².      Intake/Output Summary (Last 24 hours) at 05/15/18 0630  Last data filed at 05/15/18 0000   Gross per 24 hour   Intake              320 ml   Output              400 ml   Net              -80 ml       Significant Labs:  Lab Results   Component Value Date    GROUPTRH O POS 08/31/2011    HEPBSAG Negative 12/06/2011     No results for input(s): HGB, HCT in the last 48 hours.    I have personallly reviewed all pertinent lab results from the last 24 hours.    Physical Exam:   Constitutional: She is oriented to person, place, and time. She appears well-developed and well-nourished. No distress.    HENT:   Head: Normocephalic and atraumatic.       Pulmonary/Chest: Effort normal. No respiratory distress.        Abdominal: Soft. She exhibits no distension. There is no tenderness. There is no rebound and no guarding.             Musculoskeletal: Moves all extremeties.        Neurological: She is alert and oriented to person, place, and time.    Skin: Skin is warm and dry. She is not diaphoretic.    Psychiatric: She has a normal mood and affect. Her behavior is normal. Judgment and thought content normal.       Assessment/Plan:     23 y.o. female  for:    * Ectopic pregnancy    - Beta decreased from  3011 to 2262  - Patient is good candidate for MTX given beta levels and ectopic size  - CMP shows normal liver function  - benign abdominal exam  - To OR for d&c today, plan for MTX therapy after            Disposition: As patient meets milestones, will plan to discharge after surgery.    Mason Morataya MD  Obstetrics  [facility]

## 2018-05-15 NOTE — ANESTHESIA POSTPROCEDURE EVALUATION
"Anesthesia Post Evaluation    Patient: Heydi Goodman    Procedure(s) Performed: Procedure(s) (LRB):  DILATION-CURETTAGE OF UTERUS (D AND C) (N/A)    Final Anesthesia Type: general  Patient location during evaluation: PACU  Patient participation: Yes- Able to Participate  Level of consciousness: awake and alert  Post-procedure vital signs: reviewed and stable  Pain management: adequate  Airway patency: patent  PONV status at discharge: No PONV  Anesthetic complications: no      Cardiovascular status: blood pressure returned to baseline  Respiratory status: unassisted  Hydration status: euvolemic  Follow-up not needed.        Visit Vitals  /80 (Patient Position: Lying)   Pulse 71   Temp 36.9 °C (98.4 °F) (Oral)   Resp 16   Ht 5' 4" (1.626 m)   Wt 63 kg (138 lb 14.2 oz)   LMP  (Within Months)   SpO2 100%   Breastfeeding? No   BMI 23.84 kg/m²       Pain/Jessie Score: Pain Assessment Performed: Yes (5/15/2018  2:45 PM)  Presence of Pain: complains of pain/discomfort (5/15/2018  2:16 PM)  Pain Rating Prior to Med Admin: 5 (5/15/2018  2:40 PM)  Pain Rating Post Med Admin: 3 (5/15/2018  2:45 PM)  Jessie Score: 10 (5/15/2018  2:45 PM)      "

## 2018-05-15 NOTE — PLAN OF CARE
Problem: Patient Care Overview  Goal: Plan of Care Review  Outcome: Ongoing (interventions implemented as appropriate)  · PROBLEM: ectopic pregnacy  ·   · INTERVENTION: pain managed with po pain medication, uneventful night, peripads red spotting,red blood small clots mixed with urine.  Seen by inhouse OB-GYN, anticipate D&C in am, npo after midnight.   ·   · EVALUATION/PLAN: continue current plan of care, mother at bedside with patient.   ·   ·

## 2018-05-15 NOTE — SUBJECTIVE & OBJECTIVE
Hospital course: 05/14/2018 Admitted for observation and planned surgical procedure for suspected ectopic pregnancy.    Interval History: Pt is doing well today. Denies pain. No nausea/vomiting. Tolerating regular diet (now NPO since midnight) - ambulating without difficulty. Denies vaginal bleeding.    Objective:     Vital Signs (Most Recent):  Temp: 98.4 °F (36.9 °C) (05/15/18 0355)  Pulse: 65 (05/15/18 0355)  Resp: 18 (05/15/18 0355)  BP: (!) 94/44 (05/15/18 0355)  SpO2: 98 % (05/15/18 0355) Vital Signs (24h Range):  Temp:  [98.4 °F (36.9 °C)] 98.4 °F (36.9 °C)  Pulse:  [] 65  Resp:  [16-18] 18  SpO2:  [96 %-98 %] 98 %  BP: ()/(44-84) 94/44     Weight: 63 kg (138 lb 14.2 oz)  Body mass index is 23.84 kg/m².      Intake/Output Summary (Last 24 hours) at 05/15/18 0630  Last data filed at 05/15/18 0000   Gross per 24 hour   Intake              320 ml   Output              400 ml   Net              -80 ml       Significant Labs:  Lab Results   Component Value Date    GROUPTRH O POS 08/31/2011    HEPBSAG Negative 12/06/2011     No results for input(s): HGB, HCT in the last 48 hours.    I have personallly reviewed all pertinent lab results from the last 24 hours.    Physical Exam:   Constitutional: She is oriented to person, place, and time. She appears well-developed and well-nourished. No distress.    HENT:   Head: Normocephalic and atraumatic.       Pulmonary/Chest: Effort normal. No respiratory distress.        Abdominal: Soft. She exhibits no distension. There is no tenderness. There is no rebound and no guarding.             Musculoskeletal: Moves all extremeties.       Neurological: She is alert and oriented to person, place, and time.    Skin: Skin is warm and dry. She is not diaphoretic.    Psychiatric: She has a normal mood and affect. Her behavior is normal. Judgment and thought content normal.

## 2018-05-15 NOTE — PLAN OF CARE
SW met with pt at bedside to complete discharge assessment, mother, Nancy present.  Pt Clarissa Ramos PCP and uses [facility] [address] in [place].  Pt lives with mother and brother, 20yo.  Pt's mother will provide transportation home.  No needs identified at this time by SW or pt.     05/15/18 0933   Discharge Assessment   Assessment Type Discharge Planning Assessment   Confirmed/corrected address and phone number on facesheet? Yes   Assessment information obtained from? Patient   Communicated expected length of stay with patient/caregiver no   Prior to hospitilization cognitive status: Alert/Oriented   Prior to hospitalization functional status: Independent   Current cognitive status: Alert/Oriented   Current Functional Status: Independent   Lives With parent(s);sibling(s)   Able to Return to Prior Arrangements yes   Is patient able to care for self after discharge? Yes   Patient's perception of discharge disposition home or selfcare   Readmission Within The Last 30 Days no previous admission in last 30 days   Patient currently being followed by outpatient case management? No   Patient currently receives any other outside agency services? No   Equipment Currently Used at Home none   Do you have any problems affording any of your prescribed medications? No   Is the patient taking medications as prescribed? yes   Does the patient have transportation home? Yes   Transportation Available family or friend will provide   Does the patient receive services at the [facility]? No   Discharge Plan A Home   Patient/Family In Agreement With Plan yes

## 2018-05-15 NOTE — DISCHARGE SUMMARY
Operative Report    Procedure: Suction Dilation and Curettage    Date of Surgery 05/15/2018    Surgeon: Dr. Joseph Morris MD    Assistant: Mason Morataya MD PGY1    Pre-Op Diagnosis: ectopic pregnancy    Post-op Diagnosis:  same    EBL: minimal    IVF: 500 cc    Urine Output: 25 cc     Specimen: endometrial contents    Findings:   1. Normal appearing external genitalia  2. Uterus sounded to 3.75 inches  3. Cervix serially dilated to size of #8 Hegar  4. #7 suction tip used to perform suction curettage of uterine cavity  5. Sharp curettage performed until gritty feeling observed in all 4 uterine quadrants    Procedure in Detail:  The patient was taken to the OR where general anesthesia was found to be adequate.  She was placed in the dorsal lithotomy position and prepped and draped in the normal sterile fashion.  The weighted speculum was inserted into the posterior vagina, and the anterior lip of the cervix was grasped with the single tooth tenaculum.  The cervix was dilated with the Froilan dilators until the 7mm suction catheter was able to be inserted into the cervix without difficulty.  The uterine cavity was suctioned with multiple passes of the  catheter.  A suni curette was then used for gentle curettage to remove any remaining products of conception until the four quadrants of the uterine cavity were left with a gritty texture.  One final pass of the suction curette was made to ensure that all products had been removed.  The single tooth tenaculum was removed from the anterior lip of the cervix, and the cervix was noted to be hemostatic.  The weighted speculum was removed from the vagina.  Sponge, lap, and instrument count were correct times two.  The endometrial contents were sent to pathology for evaluation. The patient tolerated the procedure well.  She was transferred to the PACU in stable condition.      Mason Morataya MD  PGY1, Obstetrics & Gynecology  [facility]

## 2018-05-15 NOTE — DISCHARGE SUMMARY
[facility]  Brief Op Note/Discharge Note  Short Stay    Admit Date: 5/14/2018    Discharge Date: 05/15/2018    Attending Physician: DEON Morris MD     Surgery Date: 5/15/2018     Surgeon(s) and Role:     * DEON Morris MD - Primary    Assisting Surgeon: Mason Morataya MD PGY1    Pre-op Diagnosis:  Ectopic pregnancy [O00.90]  Pregnancy of unknown anatomic location [Z34.90]    Post-op Diagnosis:  Post-Op Diagnosis Codes:     * Ectopic pregnancy [O00.90]     * Pregnancy of unknown anatomic location [Z34.90]    Procedure(s) (LRB):  DILATION-CURETTAGE OF UTERUS (D AND C) (N/A)    Anesthesia: General    Findings/Key Components:   *please see op note for full report*  1. Normal appearing external genitalia  2. Uterus sounded to 3.75 inches  3. Cervix serially dilated to size of #8 Hegar  4. #7 suction tip used to perform suction curettage of uterine cavity  5. Sharp curettage performed until gritty feeling observed in all 4 uterine quadrants    Estimated Blood Loss: * No values recorded between 5/15/2018  1:37 PM and 5/15/2018  2:00 PM *         Specimens:   Specimen (12h ago through future)    Start     Ordered    05/15/18 1346  Specimen to Pathology - Surgery  Once     Comments:  1. Products of conception (FROZEN)2.  Endometrial scrapings (FROZEN)      05/15/18 1348          Discharge Provider: Mason Morataya    Diagnoses:  Active Hospital Problems    Diagnosis  POA    *Ectopic pregnancy [O00.90]  Yes    S/P dilation and curettage [Z98.890]  Not Applicable      Resolved Hospital Problems    Diagnosis Date Resolved POA   No resolved problems to display.       Discharged Condition: good    Hospital Course:   Patient was admitted for outpatient procedure as above, and tolerated the procedure well with no complications. Please see operative report for further details. Following the procedure, the patient was awakened from anesthesia and transferred to the recovery area in stable condition. She was  discharged to home once ambulating, voiding, tolerating PO intake, and pain was well-controlled. Patient was given 1 dose of methotrexate and counseled extensively regarding pain/bleeding precautions. Patient has a beta hcg ordered in 4 days and in 7 days. Pt to follow up with Dr. Morris in 1 week.     Final Diagnoses: Same as principal problem.    Disposition: Home or Self Care    Follow up/Patient Instructions:    Medications:  Reconciled Home Medications:      Medication List      START taking these medications    hydrocodone-acetaminophen 5-325 mg per tablet  Commonly known as:  NORCO  Take 1 tablet by mouth every 4 (four) hours as needed for Pain.     ibuprofen 600 MG tablet  Commonly known as:  ADVIL,MOTRIN  Take 1 tablet (600 mg total) by mouth every 8 (eight) hours as needed for Pain.        CONTINUE taking these medications    acetaminophen 500 MG tablet  Commonly known as:  TYLENOL  Take 2 tablets (1,000 mg total) by mouth 3 (three) times daily as needed for Pain.     cephALEXin 500 MG capsule  Commonly known as:  KEFLEX  Take 2 capsules (1,000 mg total) by mouth every 8 (eight) hours.     docusate sodium 100 MG capsule  Commonly known as:  COLACE  Take 100 mg by mouth 2 (two) times daily.     prenatal 21-iron fu-folic acid 14 mg iron- 400 mcg Tab  Commonly known as:  PRENATAL COMPLETE  Take 1 tablet by mouth once daily.            Discharge Procedure Orders  Diet general     Activity as tolerated     Call MD for:  temperature >100.4     Call MD for:  persistent nausea and vomiting     Call MD for:  severe uncontrolled pain     Call MD for:  difficulty breathing, headache or visual disturbances     Call MD for:  hives     Call MD for:  persistent dizziness or light-headedness     Call MD for:  extreme fatigue     Call MD for:   Order Comments: Heavy vaginal bleeding (>1 pad/hr)       Follow-up Information     RODNEY Morris MD. Schedule an appointment as soon as possible for a visit in 4 weeks.     Specialties:  Obstetrics and Gynecology, Obstetrics, Gynecology  Why:  Post Op Check  Contact information:  [address] [id number] [phone]                 Mason Morataya MD  PGY1, OBGYN  [facility]

## 2018-05-15 NOTE — SUBJECTIVE & OBJECTIVE
Obstetric History       T0      L0     SAB0   TAB0   Ectopic0   Multiple0   Live Births0       # Outcome Date GA Lbr Fidel/2nd Weight Sex Delivery Anes PTL Lv   1 Current                 Past Medical History:   Diagnosis Date    Gastroenteritis     Kidney infection     Kidney stone     Ovarian cyst 10/21/2013    PID (pelvic inflammatory disease) 10/21/2013     Past Surgical History:   Procedure Laterality Date    MOUTH SURGERY      NONE  10/21/2013       PTA Medications   Medication Sig    acetaminophen (TYLENOL) 500 MG tablet Take 2 tablets (1,000 mg total) by mouth 3 (three) times daily as needed for Pain.    cephALEXin (KEFLEX) 500 MG capsule Take 2 capsules (1,000 mg total) by mouth every 8 (eight) hours.    docusate sodium (COLACE) 100 MG capsule Take 100 mg by mouth 2 (two) times daily.    prenatal 21-iron fu-folic acid (PRENATAL COMPLETE) 14 mg iron- 400 mcg Tab Take 1 tablet by mouth once daily.       Review of patient's allergies indicates:   Allergen Reactions    Ciprofloxacin      Patient stated that when taking this medication she burns and itches and upon getting it via IV she can see the redness travel up her arm.        Family History     None        Social History Main Topics    Smoking status: Current Every Day Smoker     Packs/day: 0.50     Years: 6.00     Types: Cigarettes    Smokeless tobacco: Never Used    Alcohol use 6.0 oz/week     5 Shots of liquor, 5 Glasses of wine per week      Comment: none since may 1 , normally daily drinker, states alot,     Drug use: No    Sexual activity: Yes     Partners: Male     Birth control/ protection: None     Review of Systems   Constitutional: Negative for appetite change, chills, diaphoresis, fatigue and fever.   Respiratory: Negative for cough and shortness of breath.    Cardiovascular: Negative for chest pain and palpitations.   Gastrointestinal: Negative for abdominal pain, constipation, diarrhea, nausea and vomiting.    Genitourinary: Positive for pelvic pain and vaginal bleeding. Negative for dysuria, flank pain, frequency, genital sores, vaginal discharge, vaginal pain and vaginal odor.   Neurological: Negative for numbness and headaches.   Psychiatric/Behavioral: Negative for depression. The patient is not nervous/anxious.    Breast: Negative for skin changes     Objective:     Vital Signs (Most Recent):  Temp: 98.4 °F (36.9 °C) (05/14/18 2013)  Pulse: 108 (05/14/18 2013)  Resp: 16 (05/14/18 2013)  BP: 121/77 (05/14/18 2013) Vital Signs (24h Range):  Temp:  [98.4 °F (36.9 °C)] 98.4 °F (36.9 °C)  Pulse:  [108] 108  Resp:  [16] 16  BP: (120-121)/(70-77) 121/77        Body mass index is 23.84 kg/m².      Physical Exam:   Constitutional: She is oriented to person, place, and time. She appears well-developed and well-nourished.       Cardiovascular: Normal rate, regular rhythm, normal heart sounds and intact distal pulses.  Exam reveals no friction rub, no clubbing and no edema.    No murmur heard.   Pulmonary/Chest: Effort normal and breath sounds normal. No respiratory distress.        Abdominal: Soft. Bowel sounds are normal. There is no tenderness. There is no guarding.             Musculoskeletal: She exhibits no edema or tenderness.       Neurological: She is alert and oriented to person, place, and time.    Skin: Skin is warm and dry. Nails show no clubbing.    Psychiatric: She has a normal mood and affect. Her behavior is normal.     Significant Labs:  Lab Results   Component Value Date    [facility]    HEPBSAG Negative 12/06/2011       Recent Lab Results       05/14/18  1425      hCG Quant 2262  Comment:  Quantitative HCG Reference Ranges:  Males........................<5.0 mIU/mL  Non-Pregnant Females.........<5.0 mIU/mL  Pregnancy:  Weeks post-LMP...............Range (mIU/mL)  1-10  weeks.....................202-231,000  11-15 weeks..................22,536-360,990  16-22  weeks...................8,029-50,142  23-40 weeks...................1,457-17,368  NOTE:  This assay is not FDA approved for tumor screening,   diagnosis, or monitoring.

## 2018-05-15 NOTE — H&P (VIEW-ONLY)
Chief Complaint   Patient presents with    Follow-up     u/s       HPI:  23 y.o. female     No LMP recorded (lmp unknown). Patient is pregnant.     - Patient returns today with c/o worsening pain, particularly sharp pain in her left pelvis  - She continues to note some vaginal bleeding    - 5/3/2018 HCG=339    U/S: no IUP; complex cyst, likely corpus luteum, in the left adnexa  - 2018 HCG=1839    U/S: no IUP; complex mass in left adnexa  - 5/10/2018 HCG=3011  - 2018 HCG=pending    U/S: no IUP; complex mass in the left adnexa, separate from uterus and ovary, concerning for ectopic (verbal report)    - Seen in ED on 5/3/2018 for flank pain and found to be incidentally pregnant; treated with keflex  - Returned to ED on 2018 with vaginal bleeding  - Admitted at [facility] from 2018 to 2018 for serial abdominal exams  - Exam and bleeding improved, and HCG beryl appropriately    Contraception: NONE  Pap: 2018, Pending  Mammogram: N/A    Past Medical History:   Diagnosis Date    Gastroenteritis     Kidney infection     Kidney stone     Ovarian cyst 10/21/2013    PID (pelvic inflammatory disease) 10/21/2013     Past Surgical History:   Procedure Laterality Date    MOUTH SURGERY      NONE  10/21/2013       Social History   Substance Use Topics    Smoking status: Current Every Day Smoker     Packs/day: 0.50     Types: Cigarettes    Smokeless tobacco: Never Used    Alcohol use Yes     Family History   Problem Relation Age of Onset    Diabetes Neg Hx     Hypertension Neg Hx     Stroke Neg Hx     Heart disease Neg Hx      OB History    Para Term  AB Living   1             SAB TAB Ectopic Multiple Live Births                  # Outcome Date GA Lbr Fidel/2nd Weight Sex Delivery Anes PTL Lv   1 Current                   MEDICATIONS: Reviewed with patient.  ALLERGIES: Ciprofloxacin     ROS:  Review of Systems   Constitutional: Negative for fever.   Respiratory: Negative for  shortness of breath.    Cardiovascular: Negative for chest pain.   Gastrointestinal: Negative for abdominal pain, constipation, nausea and vomiting.   Genitourinary: Positive for pelvic pain and vaginal bleeding. Negative for vaginal discharge.   Neurological: Negative for headaches.       PHYSICAL EXAM:    /70   Wt 63 kg (138 lb 14.2 oz)   LMP  (LMP Unknown)   BMI 23.84 kg/m²     Physical Exam:   Constitutional: She is oriented to person, place, and time. She appears well-developed.    HENT:   Head: Normocephalic.       Pulmonary/Chest: Effort normal.        Abdominal: She exhibits no mass. There is no hepatosplenomegaly. There is no tenderness.     Genitourinary: Uterus is not enlarged and not tender. Cervix is normal. Right adnexum displays no tenderness and no fullness. Left adnexum displays no tenderness and no fullness. There is bleeding in the vagina. No vaginal discharge found.   Genitourinary Comments: External genitalia: Normal  Urethra: No tenderness; normal meatus  Bladder: No tenderness               Neurological: She is alert and oriented to person, place, and time.     Psychiatric: She has a normal mood and affect.         ASSESSMENT & PLAN:   Female pelvic pain    Bleeding in early pregnancy  -     US Pelvis Complete Non OB; Future; Expected date: 05/14/2018          - Reviewed u/s images and spoke with Radiology  - Likely left ectopic pregnancy  - HCG pending  - Recommended admission to hospital  - Counseled patient on management options including D&C with methotrexate therapy versus laparoscopic salpingectomy  - Counseled patient on increased risk of failure of ectopic therapy when HCG level is above 5000  - If HCG is less than 5000, will plan for suction D&C with frozen path followed by methotrexate  - If HCG is greater than 5000, will plan for diagnostic laparoscopy with possible salpingectomy    - Patient plans to leave for an hour to pick-up her   - Strongly recommended direct  admission.  Patient understands risk of rupture and internal bleeding.  Against medical advice, she plans to delay admission until later this evening.  - Will arrange for direct admission this evening  - Patient given strict precautions to proceed to the hospital immediately for worsening pain

## 2018-05-15 NOTE — PLAN OF CARE
05/15/18 1422   Final Note   Assessment Type Final Discharge Note   Discharge Disposition Home   What phone number can be called within the next 1-3 days to see how you are doing after discharge? [phone]   Discharge plans and expectations educations in teach back method with documentation complete? Yes   Right Care Referral Info   Post Acute Recommendation No Care

## 2018-05-15 NOTE — ANESTHESIA PREPROCEDURE EVALUATION
05/15/2018  Heydi Goodman is a 23 y.o., female.    Anesthesia Evaluation    I have reviewed the Patient Summary Reports.    I have reviewed the Nursing Notes.   I have reviewed the Medications.     Review of Systems  Anesthesia Hx:  No problems with previous Anesthesia  Denies Family Hx of Anesthesia complications.   Denies Personal Hx of Anesthesia complications.   Social:  Alcohol Use 1 ppd   Hematology/Oncology:  Hematology Normal   Oncology Normal     EENT/Dental:EENT/Dental Normal   Cardiovascular:  Cardiovascular Normal Exercise tolerance: good     Pulmonary:  Pulmonary Normal    Renal/:  Renal/ Normal  pyelonephritis   Hepatic/GI:   gastroenteritis   Musculoskeletal:  Musculoskeletal Normal    OB/GYN/PEDS:  PID   Neurological:  Neurology Normal    Endocrine:  Endocrine Normal    Dermatological:  Skin Normal    Psych:  Psychiatric Normal           Physical Exam  General:  Well nourished    Airway/Jaw/Neck:  Airway Findings: Mouth Opening: Normal Tongue: Normal  General Airway Assessment: Adult  Mallampati: I  TM Distance: Normal, at least 6 cm  Jaw/Neck Findings:  Neck ROM: Normal ROM      Dental:  Dental Findings: In tact             Anesthesia Plan  Type of Anesthesia, risks & benefits discussed:  Anesthesia Type:  general  Patient's Preference:   Intra-op Monitoring Plan:   Intra-op Monitoring Plan Comments:   Post Op Pain Control Plan:   Post Op Pain Control Plan Comments:   Induction:   IV  Beta Blocker:         Informed Consent: Patient understands risks and agrees with Anesthesia plan.  Questions answered. Anesthesia consent signed with patient.  ASA Score: 2     Day of Surgery Review of History & Physical:    H&P update referred to the surgeon.         Ready For Surgery From Anesthesia Perspective.

## 2018-05-15 NOTE — HPI
24 y/o  admitted for management of likely ectopic pregnancy. Patient seen in clinic today with complaints of lower abdominal pain that is intermittent as well as light vaginal bleeding. HcG has since decreased from 3011 to 2262 She denies nausea, emesis.     - 5/3/2018        HCG=339                          U/S: no IUP; complex cyst, likely corpus luteum, in the left adnexa  - 2018        HCG=1839                          U/S: no IUP; complex mass in left adnexa  - 5/10/2018      HCG=3011  - 2018      HCG= 2262                          U/S: no IUP; complex mass in the left adnexa, separate from uterus and ovary, concerning for ectopic     - Seen in ED on 5/3/2018 for flank pain and found to be incidentally pregnant; treated with keflex  - Returned to ED on 2018 with vaginal bleeding  - Admitted at [facility] from 2018 to 2018 for serial abdominal exams  - Exam and bleeding improved, and HCG beryl appropriately

## 2018-05-15 NOTE — ASSESSMENT & PLAN NOTE
- Beta decreased from  3011 to 2262  - Patient is good candidate for MTX given beta levels and ectopic size, will check CMP for assessment of liver fxn  - Exam not concerning for acute abdomen tonight  - To OR tomorrow for D&C and then likely administration of MTX

## 2018-05-15 NOTE — H&P
[facility]  Obstetrics  History & Physical    Patient Name: Heydi Goodman  MRN: [id number]  Admission Date: 2018  Primary Care Provider: Dolores Cruz MD    Subjective:     Principal Problem:Ectopic pregnancy    History of Present Illness:  22 y/o  admitted for management of likely ectopic pregnancy. Patient seen in clinic today with complaints of lower abdominal pain that is intermittent as well as light vaginal bleeding. HcG has since decreased from 3011 to 2262. Ultrasound findings today concerning for left-sided ectopic. She denies nausea, emesis.     - 5/3/2018        HCG=339                          U/S: no IUP; complex cyst, likely corpus luteum, in the left adnexa  - 2018        HCG=1839                          U/S: no IUP; complex mass in left adnexa  - 5/10/2018      HCG=3011  - 2018      HCG=  2262                          U/S: no IUP; complex mass in the left adnexa, separate from uterus and ovary, concerning for ectopic     - Seen in ED on 5/3/2018 for flank pain and found to be incidentally pregnant; treated with keflex  - Returned to ED on 2018 with vaginal bleeding  - Admitted at [facility] from 2018 to 2018 for serial abdominal exams  - Exam and bleeding improved, and HCG beryl appropriately            Obstetric History       T0      L0     SAB0   TAB0   Ectopic0   Multiple0   Live Births0       # Outcome Date GA Lbr Fidel/2nd Weight Sex Delivery Anes PTL Lv   1 Current                 Past Medical History:   Diagnosis Date    Gastroenteritis     Kidney infection     Kidney stone     Ovarian cyst 10/21/2013    PID (pelvic inflammatory disease) 10/21/2013     Past Surgical History:   Procedure Laterality Date    MOUTH SURGERY      NONE  10/21/2013       PTA Medications   Medication Sig    acetaminophen (TYLENOL) 500 MG tablet Take 2 tablets (1,000 mg total) by mouth 3 (three) times daily as needed for Pain.    cephALEXin  (KEFLEX) 500 MG capsule Take 2 capsules (1,000 mg total) by mouth every 8 (eight) hours.    docusate sodium (COLACE) 100 MG capsule Take 100 mg by mouth 2 (two) times daily.    prenatal 21-iron fu-folic acid (PRENATAL COMPLETE) 14 mg iron- 400 mcg Tab Take 1 tablet by mouth once daily.       Review of patient's allergies indicates:   Allergen Reactions    Ciprofloxacin      Patient stated that when taking this medication she burns and itches and upon getting it via IV she can see the redness travel up her arm.        Family History     None        Social History Main Topics    Smoking status: Current Every Day Smoker     Packs/day: 0.50     Years: 6.00     Types: Cigarettes    Smokeless tobacco: Never Used    Alcohol use 6.0 oz/week     5 Shots of liquor, 5 Glasses of wine per week      Comment: none since may 1 , normally daily drinker, states alot,     Drug use: No    Sexual activity: Yes     Partners: Male     Birth control/ protection: None     Review of Systems   Constitutional: Negative for appetite change, chills, diaphoresis, fatigue and fever.   Respiratory: Negative for cough and shortness of breath.    Cardiovascular: Negative for chest pain and palpitations.   Gastrointestinal: Negative for abdominal pain, constipation, diarrhea, nausea and vomiting.   Genitourinary: Positive for pelvic pain and vaginal bleeding. Negative for dysuria, flank pain, frequency, genital sores, vaginal discharge, vaginal pain and vaginal odor.   Neurological: Negative for numbness and headaches.   Psychiatric/Behavioral: Negative for depression. The patient is not nervous/anxious.    Breast: Negative for skin changes     Objective:     Vital Signs (Most Recent):  Temp: 98.4 °F (36.9 °C) (05/14/18 2013)  Pulse: 108 (05/14/18 2013)  Resp: 16 (05/14/18 2013)  BP: 121/77 (05/14/18 2013) Vital Signs (24h Range):  Temp:  [98.4 °F (36.9 °C)] 98.4 °F (36.9 °C)  Pulse:  [108] 108  Resp:  [16] 16  BP: (120-121)/(70-77) 121/77         Body mass index is 23.84 kg/m².      Physical Exam:   Constitutional: She is oriented to person, place, and time. She appears well-developed and well-nourished.       Cardiovascular: Normal rate, regular rhythm, normal heart sounds and intact distal pulses.  Exam reveals no friction rub, no clubbing and no edema.    No murmur heard.   Pulmonary/Chest: Effort normal and breath sounds normal. No respiratory distress.        Abdominal: Soft. Bowel sounds are normal. There is no tenderness. There is no guarding.             Musculoskeletal: She exhibits no edema or tenderness.       Neurological: She is alert and oriented to person, place, and time.    Skin: Skin is warm and dry. Nails show no clubbing.    Psychiatric: She has a normal mood and affect. Her behavior is normal.     Significant Labs:  Lab Results   Component Value Date    GROUPTRH O POS 2011    HEPBSAG Negative 2011     TV U/S results   Left adnexal mass distinct from left ovary in keeping with ectopic pregnancy.  Moderate volume of complex fluid, likely hemoperitoneum.    COMMUNICATION  This critical result was discovered/received at 4:20 p.m..  The critical information above was relayed directly by me by telephone to Dr. Morris on 18 at 4:47 p.m..      Recent Lab Results       18  1425      hCG Quant 2262  Comment:  Quantitative HCG Reference Ranges:  Males........................<5.0 mIU/mL  Non-Pregnant Females.........<5.0 mIU/mL  Pregnancy:  Weeks post-LMP...............Range (mIU/mL)  1-10  weeks.....................202-231,000  11-15 weeks..................22,536-234,990  16-22 weeks...................8,007-50,064  23-40 weeks...................1,600-49,413  NOTE:  This assay is not FDA approved for tumor screening,   diagnosis, or monitoring.           Assessment/Plan:     23 y.o. female  at Unknown for:    * Ectopic pregnancy    - Beta decreased from  3011 to 2262  - Patient is good candidate for MTX given beta  levels and ectopic size, will check CMP for assessment of renal and liver fxn  - Exam not concerning for acute abdomen tonight  - To OR tomorrow for D&C and then likely administration of MTX            B Jasbir Buchanan MD  Obstetrics  [facility]

## 2018-05-15 NOTE — NURSING
Received to unit, friend with patient. No apparent or verbalized distress. AAO. States she has occasional, intermittent left abdominal pain that is sharp at other times, some generalized cramps.

## 2018-05-16 NOTE — NURSING
Discharge Note    Patient discussed with and verbalized understanding of plan of care ion discharge, instructed to call doctor's office for any further questions or concerns      Methotrexate injections given by [facility] supervisor, in bilateral vertical gluteal with proper care  Stable,  VSS  RA, tolerating   IV in RH removed, cath tip intact, pt tolerated, no bleeding   Wheelchair on discharge to family car with mother  Mother also verbalized understanding of care  Medication list and prescriptions given to patient     No further questions or concerns at this time

## 2018-05-18 ENCOUNTER — LAB VISIT (OUTPATIENT)
Dept: LAB | Facility: OTHER | Age: 23
End: 2018-05-18
Payer: MEDICAID

## 2018-05-18 DIAGNOSIS — O36.80X0 PREGNANCY OF UNKNOWN ANATOMIC LOCATION: ICD-10-CM

## 2018-05-18 LAB
HCG INTACT+B SERPL-ACNC: 1989 MIU/ML
HCG INTACT+B SERPL-ACNC: 1989 MIU/ML

## 2018-05-18 PROCEDURE — 84702 CHORIONIC GONADOTROPIN TEST: CPT

## 2018-05-18 PROCEDURE — 36415 COLL VENOUS BLD VENIPUNCTURE: CPT

## 2018-05-24 ENCOUNTER — LAB VISIT (OUTPATIENT)
Dept: LAB | Facility: OTHER | Age: 23
End: 2018-05-24
Payer: MEDICAID

## 2018-05-24 ENCOUNTER — TELEPHONE (OUTPATIENT)
Dept: OBSTETRICS AND GYNECOLOGY | Facility: HOSPITAL | Age: 23
End: 2018-05-24

## 2018-05-24 ENCOUNTER — TELEPHONE (OUTPATIENT)
Dept: OBSTETRICS AND GYNECOLOGY | Facility: CLINIC | Age: 23
End: 2018-05-24

## 2018-05-24 DIAGNOSIS — O00.90 ECTOPIC PREGNANCY, UNSPECIFIED LOCATION, UNSPECIFIED WHETHER INTRAUTERINE PREGNANCY PRESENT: Primary | ICD-10-CM

## 2018-05-24 DIAGNOSIS — O00.90 ECTOPIC PREGNANCY, UNSPECIFIED LOCATION, UNSPECIFIED WHETHER INTRAUTERINE PREGNANCY PRESENT: ICD-10-CM

## 2018-05-24 LAB — HCG INTACT+B SERPL-ACNC: 613 MIU/ML

## 2018-05-24 PROCEDURE — 84702 CHORIONIC GONADOTROPIN TEST: CPT

## 2018-05-24 PROCEDURE — 36415 COLL VENOUS BLD VENIPUNCTURE: CPT

## 2018-05-24 NOTE — TELEPHONE ENCOUNTER
Called pt and pt stated that she want the nexplanon. Informed pt she would have to fill out a consent form. Pt verbalized understanding

## 2018-05-24 NOTE — TELEPHONE ENCOUNTER
Pt chart reviewed:    Pt given MTX on 5/15. Beta hcg at that time 2262    Orders placed for repeat beta hcg on 5/18 and 5/21.  --blood drawn on 5/18. Used same blood for both above beta hcg orders.  --no repeat done on 5/21    Called patient to recommend repeat beta hcg level today. Order placed. Will f/u results - may need possible repeat MTX dose. Pt verbalized understanding. Says she will go to the lab now for repeat.    Pt also requesting follow up appointment with Dr. Morris for nexplanon placement. Will message his office to set up.

## 2018-05-24 NOTE — TELEPHONE ENCOUNTER
----- Message from Mason Morataya MD sent at 5/24/2018  1:13 PM CDT -----  Please call patient to schedule appt. Desires nexplanon placement. Also needs f/u s/p MTX therapy for ectopic.

## 2018-05-25 ENCOUNTER — TELEPHONE (OUTPATIENT)
Dept: OBSTETRICS AND GYNECOLOGY | Facility: HOSPITAL | Age: 23
End: 2018-05-25

## 2018-05-25 ENCOUNTER — TELEPHONE (OUTPATIENT)
Dept: OBSTETRICS AND GYNECOLOGY | Facility: CLINIC | Age: 23
End: 2018-05-25

## 2018-05-25 DIAGNOSIS — O00.90 ECTOPIC PREGNANCY, UNSPECIFIED LOCATION, UNSPECIFIED WHETHER INTRAUTERINE PREGNANCY PRESENT: Primary | ICD-10-CM

## 2018-05-25 NOTE — TELEPHONE ENCOUNTER
----- Message from Royer Ivy sent at 5/25/2018 10:45 AM CDT -----  Contact: self [phone]  Re:Consult- Patient request sooner appointment than June 14; to see doctor;per in very bad pain;please call to discuss date/time      Thanks

## 2018-05-25 NOTE — TELEPHONE ENCOUNTER
Discussed lab results with patient. Beta hcg down to 613. Appropriate fall following MTX therapy. Needs repeat beta in 1 week. Patient verbalized understanding. Order placed.    Mason Morataya MD  PGY1, OBGYN [facility]

## 2018-06-11 ENCOUNTER — LAB VISIT (OUTPATIENT)
Dept: LAB | Facility: OTHER | Age: 23
End: 2018-06-11
Payer: MEDICAID

## 2018-06-11 ENCOUNTER — TELEPHONE (OUTPATIENT)
Dept: OBSTETRICS AND GYNECOLOGY | Facility: CLINIC | Age: 23
End: 2018-06-11

## 2018-06-11 DIAGNOSIS — Z30.011 ENCOUNTER FOR INITIAL PRESCRIPTION OF CONTRACEPTIVE PILLS: ICD-10-CM

## 2018-06-11 DIAGNOSIS — Z30.011 ENCOUNTER FOR INITIAL PRESCRIPTION OF CONTRACEPTIVE PILLS: Primary | ICD-10-CM

## 2018-06-11 DIAGNOSIS — O00.90 ECTOPIC PREGNANCY, UNSPECIFIED LOCATION, UNSPECIFIED WHETHER INTRAUTERINE PREGNANCY PRESENT: ICD-10-CM

## 2018-06-11 PROBLEM — Z98.890 S/P DILATION AND CURETTAGE: Status: RESOLVED | Noted: 2018-05-15 | Resolved: 2018-06-11

## 2018-06-11 PROBLEM — O36.80X0 PREGNANCY OF UNKNOWN ANATOMIC LOCATION: Status: RESOLVED | Noted: 2018-05-08 | Resolved: 2018-06-11

## 2018-06-11 LAB — HCG INTACT+B SERPL-ACNC: 7.1 MIU/ML

## 2018-06-11 PROCEDURE — 36415 COLL VENOUS BLD VENIPUNCTURE: CPT

## 2018-06-11 PROCEDURE — 84702 CHORIONIC GONADOTROPIN TEST: CPT

## 2018-06-11 RX ORDER — NORGESTIMATE AND ETHINYL ESTRADIOL 0.25-0.035
1 KIT ORAL DAILY
Qty: 28 TABLET | Refills: 12 | Status: SHIPPED | OUTPATIENT
Start: 2018-06-11 | End: 2018-06-12 | Stop reason: SDUPTHER

## 2018-06-11 NOTE — TELEPHONE ENCOUNTER
Returned pt call and pt wanted her medication sent to [facility]. Informed pt that message will be forwarded to . Pt verbalized understanding

## 2018-06-11 NOTE — TELEPHONE ENCOUNTER
----- Message from DEON Morris MD sent at 6/10/2018 12:49 PM CDT -----  Regarding: FW: HCG  Please schedule patient for a lab appointment this week for repeat HCG level.  Thanks.    ----- Message -----  From: DEON Morris MD  Sent: 5/31/2018  12:57 PM  To: DEON Morris MD  Subject: HCG                                              Repeat HCG

## 2018-06-12 ENCOUNTER — TELEPHONE (OUTPATIENT)
Dept: OBSTETRICS AND GYNECOLOGY | Facility: CLINIC | Age: 23
End: 2018-06-12

## 2018-06-12 DIAGNOSIS — O00.90 ECTOPIC PREGNANCY WITHOUT INTRAUTERINE PREGNANCY, UNSPECIFIED LOCATION: Primary | ICD-10-CM

## 2018-06-12 RX ORDER — NORGESTIMATE AND ETHINYL ESTRADIOL 0.25-0.035
1 KIT ORAL DAILY
Qty: 28 TABLET | Refills: 12 | Status: SHIPPED | OUTPATIENT
Start: 2018-06-12 | End: 2018-06-19 | Stop reason: ALTCHOICE

## 2018-06-19 ENCOUNTER — TELEPHONE (OUTPATIENT)
Dept: OBSTETRICS AND GYNECOLOGY | Facility: CLINIC | Age: 23
End: 2018-06-19

## 2018-06-19 DIAGNOSIS — Z30.013 ENCOUNTER FOR INITIAL PRESCRIPTION OF INJECTABLE CONTRACEPTIVE: Primary | ICD-10-CM

## 2018-06-19 RX ORDER — MEDROXYPROGESTERONE ACETATE 150 MG/ML
150 INJECTION, SUSPENSION INTRAMUSCULAR
Qty: 1 ML | Refills: 3 | Status: SHIPPED | OUTPATIENT
Start: 2018-06-19 | End: 2020-08-05

## 2018-06-19 NOTE — TELEPHONE ENCOUNTER
Called pt and informed pt that depo was sent to her pharmacy. Scheduled appt for injection 6/20/18. Also informed pt that she will also have a pregnancy test done before injection. Pt verbalized understanding

## 2018-06-19 NOTE — TELEPHONE ENCOUNTER
Returned pt call and pt stated she would like the depo injections instead of the pills. Informed pt that message will be forwarded to . Pt verbalized understanding

## 2018-06-19 NOTE — TELEPHONE ENCOUNTER
----- Message from Lisa Gary sent at 6/19/2018  2:17 PM CDT -----  Contact: pt            Name of Who is Calling: RICARDO GUPTA [[id number]]      What is the request in detail: pt calling to discuss getting depo infection and not the pill.. Please advise...      Can the clinic reply by [facility]: no    What Number to [facility] Back [facility] not [facility]: [phone]

## 2018-06-20 ENCOUNTER — CLINICAL SUPPORT (OUTPATIENT)
Dept: OBSTETRICS AND GYNECOLOGY | Facility: CLINIC | Age: 23
End: 2018-06-20
Payer: MEDICAID

## 2018-06-20 DIAGNOSIS — R10.2 FEMALE PELVIC PAIN: Primary | ICD-10-CM

## 2018-06-20 LAB
B-HCG UR QL: NEGATIVE
CTP QC/QA: YES

## 2018-06-20 PROCEDURE — 99212 OFFICE O/P EST SF 10 MIN: CPT | Mod: PBBFAC,25

## 2018-06-20 PROCEDURE — 81025 URINE PREGNANCY TEST: CPT | Mod: PBBFAC

## 2018-06-20 PROCEDURE — 99999 PR PBB SHADOW E&M-EST. PATIENT-LVL II: CPT | Mod: PBBFAC,,,

## 2018-06-20 PROCEDURE — 96372 THER/PROPH/DIAG INJ SC/IM: CPT | Mod: PBBFAC

## 2018-06-20 RX ORDER — MEDROXYPROGESTERONE ACETATE 150 MG/ML
150 INJECTION, SUSPENSION INTRAMUSCULAR
Status: DISCONTINUED | OUTPATIENT
Start: 2018-06-20 | End: 2020-08-05

## 2018-06-20 RX ADMIN — MEDROXYPROGESTERONE ACETATE 150 MG: 150 INJECTION, SUSPENSION INTRAMUSCULAR at 03:06

## 2018-09-17 ENCOUNTER — CLINICAL SUPPORT (OUTPATIENT)
Dept: OBSTETRICS AND GYNECOLOGY | Facility: CLINIC | Age: 23
End: 2018-09-17
Payer: MEDICAID

## 2018-09-17 PROCEDURE — 99212 OFFICE O/P EST SF 10 MIN: CPT | Mod: PBBFAC

## 2018-09-17 PROCEDURE — 99999 PR PBB SHADOW E&M-EST. PATIENT-LVL II: CPT | Mod: PBBFAC,,,

## 2018-09-17 PROCEDURE — 96372 THER/PROPH/DIAG INJ SC/IM: CPT | Mod: PBBFAC

## 2018-09-17 RX ADMIN — MEDROXYPROGESTERONE ACETATE 150 MG: 150 INJECTION, SUSPENSION INTRAMUSCULAR at 01:09

## 2018-09-17 NOTE — PROGRESS NOTES
Here for Depo Provera Injection, date due 9/5 - 9/19/18 . Last injection given  6/20/18 . Patient with no current complaints of pain prior to or after injection. Advised to wait 5 minutes. Return between  December 3 - 17 /2018 for next injection.  PATIENT SUPPLIED MEDICATION.  See medication Card for RX information  Order verified, inspected package,storage verified,expiration verified        Site - RD

## 2019-09-26 ENCOUNTER — TELEPHONE (OUTPATIENT)
Dept: OBSTETRICS AND GYNECOLOGY | Facility: CLINIC | Age: 24
End: 2019-09-26

## 2019-09-26 NOTE — TELEPHONE ENCOUNTER
----- Message from Emely Mata sent at 9/26/2019 11:15 AM CDT -----  Contact: Pt  Pt called to schedule her annual wwe and would like a call back at [phone]

## 2020-07-31 ENCOUNTER — HOSPITAL ENCOUNTER (EMERGENCY)
Facility: OTHER | Age: 25
Discharge: HOME | End: 2020-07-31
Attending: EMERGENCY MEDICINE
Payer: MEDICAID

## 2020-07-31 VITALS
SYSTOLIC BLOOD PRESSURE: 134 MMHG | OXYGEN SATURATION: 97 % | TEMPERATURE: 98 F | DIASTOLIC BLOOD PRESSURE: 73 MMHG | HEIGHT: 65 IN | WEIGHT: 145 LBS | RESPIRATION RATE: 18 BRPM | HEART RATE: 75 BPM | BODY MASS INDEX: 24.16 KG/M2

## 2020-07-31 DIAGNOSIS — O36.80X0 PREGNANCY OF UNKNOWN ANATOMIC LOCATION: ICD-10-CM

## 2020-07-31 DIAGNOSIS — Z34.90 PREGNANCY AT EARLY STAGE: Primary | ICD-10-CM

## 2020-07-31 DIAGNOSIS — R10.2 PELVIC PAIN: ICD-10-CM

## 2020-07-31 LAB
ALBUMIN SERPL BCP-MCNC: 4.2 G/DL (ref 3.5–5.2)
ALP SERPL-CCNC: 89 U/L (ref 55–135)
ALT SERPL W/O P-5'-P-CCNC: 17 U/L (ref 10–44)
ANION GAP SERPL CALC-SCNC: 9 MMOL/L (ref 8–16)
AST SERPL-CCNC: 17 U/L (ref 10–40)
B-HCG UR QL: POSITIVE
BASOPHILS # BLD AUTO: 0.09 K/UL (ref 0–0.2)
BASOPHILS NFR BLD: 0.9 % (ref 0–1.9)
BILIRUB SERPL-MCNC: 0.3 MG/DL (ref 0.1–1)
BILIRUB UR QL STRIP: NEGATIVE
BUN SERPL-MCNC: 12 MG/DL (ref 6–20)
CALCIUM SERPL-MCNC: 9.2 MG/DL (ref 8.7–10.5)
CHLORIDE SERPL-SCNC: 105 MMOL/L (ref 95–110)
CLARITY UR: CLEAR
CO2 SERPL-SCNC: 22 MMOL/L (ref 23–29)
COLOR UR: YELLOW
CREAT SERPL-MCNC: 0.9 MG/DL (ref 0.5–1.4)
CTP QC/QA: YES
DIFFERENTIAL METHOD: ABNORMAL
EOSINOPHIL # BLD AUTO: 0.1 K/UL (ref 0–0.5)
EOSINOPHIL NFR BLD: 1.2 % (ref 0–8)
ERYTHROCYTE [DISTWIDTH] IN BLOOD BY AUTOMATED COUNT: 11.8 % (ref 11.5–14.5)
EST. GFR  (AFRICAN AMERICAN): >60 ML/MIN/1.73 M^2
EST. GFR  (NON AFRICAN AMERICAN): >60 ML/MIN/1.73 M^2
GLUCOSE SERPL-MCNC: 123 MG/DL (ref 70–110)
GLUCOSE UR QL STRIP: NEGATIVE
HCG INTACT+B SERPL-ACNC: 33 MIU/ML
HCT VFR BLD AUTO: 44.2 % (ref 37–48.5)
HGB BLD-MCNC: 14.5 G/DL (ref 12–16)
HGB UR QL STRIP: ABNORMAL
IMM GRANULOCYTES # BLD AUTO: 0.05 K/UL (ref 0–0.04)
IMM GRANULOCYTES NFR BLD AUTO: 0.5 % (ref 0–0.5)
KETONES UR QL STRIP: NEGATIVE
LEUKOCYTE ESTERASE UR QL STRIP: NEGATIVE
LYMPHOCYTES # BLD AUTO: 1.8 K/UL (ref 1–4.8)
LYMPHOCYTES NFR BLD: 18.4 % (ref 18–48)
MCH RBC QN AUTO: 32.2 PG (ref 27–31)
MCHC RBC AUTO-ENTMCNC: 32.8 G/DL (ref 32–36)
MCV RBC AUTO: 98 FL (ref 82–98)
MONOCYTES # BLD AUTO: 0.6 K/UL (ref 0.3–1)
MONOCYTES NFR BLD: 6.5 % (ref 4–15)
NEUTROPHILS # BLD AUTO: 7 K/UL (ref 1.8–7.7)
NEUTROPHILS NFR BLD: 72.5 % (ref 38–73)
NITRITE UR QL STRIP: NEGATIVE
NRBC BLD-RTO: 0 /100 WBC
PH UR STRIP: 7 [PH] (ref 5–8)
PLATELET # BLD AUTO: 291 K/UL (ref 150–350)
PMV BLD AUTO: 9.3 FL (ref 9.2–12.9)
POTASSIUM SERPL-SCNC: 4 MMOL/L (ref 3.5–5.1)
PROT SERPL-MCNC: 7.4 G/DL (ref 6–8.4)
PROT UR QL STRIP: NEGATIVE
RBC # BLD AUTO: 4.51 M/UL (ref 4–5.4)
SODIUM SERPL-SCNC: 136 MMOL/L (ref 136–145)
SP GR UR STRIP: 1.02 (ref 1–1.03)
URN SPEC COLLECT METH UR: ABNORMAL
UROBILINOGEN UR STRIP-ACNC: NEGATIVE EU/DL
WBC # BLD AUTO: 9.65 K/UL (ref 3.9–12.7)

## 2020-07-31 PROCEDURE — 80053 COMPREHEN METABOLIC PANEL: CPT

## 2020-07-31 PROCEDURE — 85025 COMPLETE CBC W/AUTO DIFF WBC: CPT

## 2020-07-31 PROCEDURE — 81025 URINE PREGNANCY TEST: CPT | Performed by: EMERGENCY MEDICINE

## 2020-07-31 PROCEDURE — 81003 URINALYSIS AUTO W/O SCOPE: CPT

## 2020-07-31 PROCEDURE — 99284 EMERGENCY DEPT VISIT MOD MDM: CPT

## 2020-07-31 PROCEDURE — 84702 CHORIONIC GONADOTROPIN TEST: CPT

## 2020-07-31 NOTE — DISCHARGE INSTRUCTIONS
Return to [facility] lab Monday from 8 a.m. to 5:00 p.m., across from JOSE R's on second floor

## 2020-07-31 NOTE — ED PROVIDER NOTES
Encounter Date: 2020       History     Chief Complaint   Patient presents with    Abdominal Pain     pt reports abd cramping with spotting. LMP . reports irregular periods     Patient is a 25-year-old A1 female who is presenting to the emergency department with pelvic cramping and vaginal spotting.  Patient had positive pregnancy test this morning.  She is worried because she has had a prior ectopic pregnancy.  She states cramping is diffuse.  She reports light pink blood, only with wiping.  She denies passing tissue or clots.  She denies nausea, vomiting, dysuria, or lightheadedness.    The history is provided by the patient.     Review of patient's allergies indicates:   Allergen Reactions    Ciprofloxacin      Patient stated that when taking this medication she burns and itches and upon getting it via IV she can see the redness travel up her arm.     Past Medical History:   Diagnosis Date    Gastroenteritis     Kidney infection     Kidney stone     Ovarian cyst 10/21/2013    PID (pelvic inflammatory disease) 10/21/2013     Past Surgical History:   Procedure Laterality Date    MOUTH SURGERY      NONE  10/21/2013     Family History   Problem Relation Age of Onset    Diabetes Neg Hx     Hypertension Neg Hx     Stroke Neg Hx     Heart disease Neg Hx      Social History     Tobacco Use    Smoking status: Current Every Day Smoker     Packs/day: 0.50     Years: 6.00     Pack years: 3.00     Types: Cigarettes    Smokeless tobacco: Never Used   Substance Use Topics    Alcohol use: Yes     Alcohol/week: 10.0 standard drinks     Types: 5 Shots of liquor, 5 Glasses of wine per week     Comment: none since may 1 , normally daily drinker, states alot,     Drug use: No     Review of Systems   Constitutional: Negative for chills and fever.   Respiratory: Negative for shortness of breath.    Cardiovascular: Negative for chest pain.   Gastrointestinal: Positive for abdominal pain. Negative for  diarrhea, nausea and vomiting.   Genitourinary: Positive for pelvic pain and vaginal bleeding (spotting). Negative for dysuria.   Musculoskeletal: Negative for arthralgias.   Skin: Negative for rash.   Allergic/Immunologic: Negative for immunocompromised state.   Neurological: Negative for light-headedness.       Physical Exam     Initial Vitals [07/31/20 1247]   BP Pulse Resp Temp SpO2   134/85 90 18 98.9 °F (37.2 °C) 96 %      MAP       --         Physical Exam    Constitutional: Vital signs are normal. She is cooperative.  Non-toxic appearance. She does not have a sickly appearance. No distress.   HENT:   Head: Normocephalic and atraumatic.   Eyes: Conjunctivae and EOM are normal.   Neck: Normal range of motion. Neck supple.   Cardiovascular: Normal rate, regular rhythm and intact distal pulses.   Pulmonary/Chest: Breath sounds normal. No respiratory distress.   Abdominal: Soft. Bowel sounds are normal. There is no abdominal tenderness.   Genitourinary:    Genitourinary Comments: Normal appearance of the external genitalia, no skin lesions, erythema or masses. Pink vaginal mucosa. Cervix pink with no erythema or discharge noted. Cervical os is closed. No CMT, adnexal tenderness.      Musculoskeletal: Normal range of motion.   Neurological: She is alert and oriented to person, place, and time. GCS eye subscore is 4. GCS verbal subscore is 5. GCS motor subscore is 6.   Skin: Skin is warm and dry. No rash noted.         ED Course   Procedures  Labs Reviewed   URINALYSIS - Abnormal; Notable for the following components:       Result Value    Occult Blood UA Trace (*)     All other components within normal limits   CBC W/ AUTO DIFFERENTIAL - Abnormal; Notable for the following components:    Mean Corpuscular Hemoglobin 32.2 (*)     Immature Grans (Abs) 0.05 (*)     All other components within normal limits   COMPREHENSIVE METABOLIC PANEL - Abnormal; Notable for the following components:    CO2 22 (*)     Glucose 123  (*)     All other components within normal limits   POCT URINE PREGNANCY - Abnormal; Notable for the following components:    POC Preg Test, Ur Positive (*)     All other components within normal limits   HCG, QUANTITATIVE, PREGNANCY   TYPE & SCREEN          Imaging Results    None          Medical Decision Making:   Initial Assessment:   Urgent evaluation of a 25 y.o. female with history of ectopic pregnancy presenting to the emergency department complaining of pelvic cramping and vaginal spotting, positive UPT at home today. Patient is afebrile, nontoxic appearing and hemodynamically stable.  -patient has benign abdominal exam.  I do not have high suspicion for ectopic pregnancy.  Will obtain lab work, hormone level  ED Management:  -blood work without gross abnormalities.  -beta HCG is 33, significantly lower than discriminatory zone.  -pelvic exam normal.  No adnexal pain or masses.  -case discussed with OB Gyne, will place patient beta book.  Patient given strict return precautions to the ED. Advised that this may be implantation bleeding, threatened miscarriage.  Patient voices understanding.  -patient was stuck 3 times and type and screen with unable to be obtained due to hemolysis and improper initials.  Do not think we need to stick patient a 4th time for this given that patient is not actively bleeding, relieved pregnancy.  Type and screen from a few years ago is O-positive.                                   Clinical Impression:     1. Pregnancy at early stage    2. Pregnancy of unknown anatomic location    3. Pelvic pain

## 2020-07-31 NOTE — Clinical Note
Heydi JAIME Evaristoclint was seen and treated in our emergency department on 7/31/2020.  She may return to work on 08/04/2020.       If you have any questions or concerns, please don't hesitate to call.      Supa Flores PA-C

## 2020-08-03 ENCOUNTER — LAB VISIT (OUTPATIENT)
Dept: LAB | Facility: OTHER | Age: 25
End: 2020-08-03
Payer: MEDICAID

## 2020-08-03 DIAGNOSIS — O36.80X0 PREGNANCY OF UNKNOWN ANATOMIC LOCATION: ICD-10-CM

## 2020-08-03 LAB — HCG INTACT+B SERPL-ACNC: 188 MIU/ML

## 2020-08-03 PROCEDURE — 84702 CHORIONIC GONADOTROPIN TEST: CPT

## 2020-08-03 PROCEDURE — 36415 COLL VENOUS BLD VENIPUNCTURE: CPT

## 2020-08-04 ENCOUNTER — TELEPHONE (OUTPATIENT)
Dept: OBSTETRICS AND GYNECOLOGY | Facility: CLINIC | Age: 25
End: 2020-08-04

## 2020-08-04 NOTE — TELEPHONE ENCOUNTER
----- Message from Yanique Cuevas sent at 8/4/2020  1:29 PM CDT -----  Name of Who is Calling: RICARDO GUPTA [[id number]]      What is the request in detail: Pt is calling to request lab results. Please contact to further discuss and advise.        Can the clinic reply by [facility]: N      What Number to Call Back [facility] not [facility]:[phone]

## 2020-08-05 ENCOUNTER — ROUTINE PRENATAL (OUTPATIENT)
Dept: OBSTETRICS AND GYNECOLOGY | Facility: CLINIC | Age: 25
End: 2020-08-05
Payer: MEDICAID

## 2020-08-05 ENCOUNTER — HOSPITAL ENCOUNTER (OUTPATIENT)
Dept: RADIOLOGY | Facility: OTHER | Age: 25
Discharge: HOME | End: 2020-08-05
Attending: STUDENT IN AN ORGANIZED HEALTH CARE EDUCATION/TRAINING PROGRAM
Payer: MEDICAID

## 2020-08-05 VITALS — BODY MASS INDEX: 26.6 KG/M2 | DIASTOLIC BLOOD PRESSURE: 80 MMHG | SYSTOLIC BLOOD PRESSURE: 130 MMHG | WEIGHT: 159.81 LBS

## 2020-08-05 DIAGNOSIS — Z87.59 HISTORY OF ECTOPIC PREGNANCY: ICD-10-CM

## 2020-08-05 DIAGNOSIS — O26.891 ABDOMINAL PAIN DURING PREGNANCY IN FIRST TRIMESTER: ICD-10-CM

## 2020-08-05 DIAGNOSIS — O20.9 VAGINAL BLEEDING IN PREGNANCY, FIRST TRIMESTER: Primary | ICD-10-CM

## 2020-08-05 DIAGNOSIS — O20.9 VAGINAL BLEEDING IN PREGNANCY, FIRST TRIMESTER: ICD-10-CM

## 2020-08-05 DIAGNOSIS — R10.9 ABDOMINAL PAIN DURING PREGNANCY IN FIRST TRIMESTER: ICD-10-CM

## 2020-08-05 DIAGNOSIS — R35.0 URINARY FREQUENCY: ICD-10-CM

## 2020-08-05 PROCEDURE — 76817 TRANSVAGINAL US OBSTETRIC: CPT | Mod: 26,,, | Performed by: RADIOLOGY

## 2020-08-05 PROCEDURE — 76801 US OB <14 WEEKS, TRANSABDOM & TRANSVAG, SINGLE GESTATION (XPD): ICD-10-PCS | Mod: 26,,, | Performed by: RADIOLOGY

## 2020-08-05 PROCEDURE — 76801 OB US < 14 WKS SINGLE FETUS: CPT | Mod: 26,,, | Performed by: RADIOLOGY

## 2020-08-05 PROCEDURE — 99999 PR PBB SHADOW E&M-EST. PATIENT-LVL III: CPT | Mod: PBBFAC,,, | Performed by: OBSTETRICS & GYNECOLOGY

## 2020-08-05 PROCEDURE — 76817 US OB <14 WEEKS, TRANSABDOM & TRANSVAG, SINGLE GESTATION (XPD): ICD-10-PCS | Mod: 26,,, | Performed by: RADIOLOGY

## 2020-08-05 PROCEDURE — 99999 PR PBB SHADOW E&M-EST. PATIENT-LVL III: ICD-10-PCS | Mod: PBBFAC,,, | Performed by: OBSTETRICS & GYNECOLOGY

## 2020-08-05 PROCEDURE — 81001 URINALYSIS AUTO W/SCOPE: CPT

## 2020-08-05 PROCEDURE — 99213 OFFICE O/P EST LOW 20 MIN: CPT | Mod: PBBFAC,PO,25 | Performed by: OBSTETRICS & GYNECOLOGY

## 2020-08-05 PROCEDURE — 76801 OB US < 14 WKS SINGLE FETUS: CPT | Mod: TC

## 2020-08-05 PROCEDURE — 84702 CHORIONIC GONADOTROPIN TEST: CPT

## 2020-08-05 PROCEDURE — 99213 OFFICE O/P EST LOW 20 MIN: CPT | Mod: S$PBB,TH,, | Performed by: OBSTETRICS & GYNECOLOGY

## 2020-08-05 PROCEDURE — 99213 PR OFFICE/OUTPT VISIT, EST, LEVL III, 20-29 MIN: ICD-10-PCS | Mod: S$PBB,TH,, | Performed by: OBSTETRICS & GYNECOLOGY

## 2020-08-05 PROCEDURE — 87086 URINE CULTURE/COLONY COUNT: CPT

## 2020-08-05 RX ORDER — CYCLOBENZAPRINE HCL 10 MG
TABLET ORAL
COMMUNITY
Start: 2020-06-18 | End: 2020-08-06

## 2020-08-05 RX ORDER — MELOXICAM 15 MG/1
TABLET ORAL
COMMUNITY
Start: 2020-06-18 | End: 2020-08-06

## 2020-08-05 NOTE — LETTER
August 5, 2020      [facility]  [address] [place] [address]  Phone: [phone]       Patient: Heydi Goodman   YOB: 1995  Date of Visit: 08/05/2020    To Whom It May Concern:    Brad Goodman  was at [facility] on 08/05/2020. She may return to work/school on 8/6/2020 with no restrictions. If you have any questions or concerns, or if I can be of further assistance, please do not hesitate to contact me.    Sincerely,    Citlali Herrera MD

## 2020-08-05 NOTE — PROGRESS NOTES
Reason for visit: Follow-up (Pain and spotting in early pregnancy.  History of ectopic pregnancy.)    HPI:    25 y.o. female     No LMP recorded. Patient is pregnant.     Patient presents today for follow up after being seen in the ED for abdominal pain and bleeding. Patient was found to be pregnant with a beta of 33. She had a repeat 48 hours later which increased to 188. She continues to experience occasional abdominal pain and spotting. She says she notices the spotting a lot after urinating and does notice some increased frequency. Denies dysuria. She does acknowledge a history of ectopic pregnancy which was treated with Methotrexate. Patient strongly desires this pregnancy. She thinks her LMP was in  but has history of very irregular periods.         Past medical, surgical, social, family, and obstetric history: Reviewed and updated in EMR.  Medications: Reviewed and updated in EMR.  Allergies: Ciprofloxacin       Review of Systems   Constitutional: Negative for fever.   Respiratory: Negative for shortness of breath.    Cardiovascular: Negative for chest pain.   Gastrointestinal: Negative for abdominal pain, nausea and vomiting.   Genitourinary: Positive for frequency, pelvic pain and vaginal bleeding (spotting). Negative for menstrual problem.   Musculoskeletal: Positive for back pain.   Neurological: Negative for headaches.         Vitals: /80   Wt 72.5 kg (159 lb 13.3 oz)   BMI 26.60 kg/m²     Physical Exam  Constitutional:       General: She is not in acute distress.     Appearance: Normal appearance. She is well-developed.   Neck:      Musculoskeletal: Normal range of motion and neck supple.   Pulmonary:      Effort: Pulmonary effort is normal. No respiratory distress.   Abdominal:      General: There is no distension.      Palpations: Abdomen is soft. There is no hepatomegaly, splenomegaly or mass.      Tenderness: There is no abdominal tenderness. There is no guarding or rebound.       Hernia: No hernia is present.   Musculoskeletal:      Right lower leg: No edema.      Left lower leg: No edema.   Neurological:      Mental Status: She is alert and oriented to person, place, and time.   Skin:     Findings: No rash.   Psychiatric:         Mood and Affect: Mood and affect normal.           Assessment & Plan:    Vaginal bleeding in pregnancy, first trimester  -     US OB <14 Wks TransAbd & TransVag, Single Gestation (XPD); Future; Expected date: 08/05/2020  -     hCG, quantitative; Standing  -     hCG, quantitative    Abdominal pain during pregnancy in first trimester  -     US OB <14 Wks TransAbd & TransVag, Single Gestation (XPD); Future; Expected date: 08/05/2020  -     hCG, quantitative; Standing  -     hCG, quantitative    History of ectopic pregnancy  -     US OB <14 Wks TransAbd & TransVag, Single Gestation (XPD); Future; Expected date: 08/05/2020  -     hCG, quantitative; Standing  -     hCG, quantitative    Urinary frequency  -     Urinalysis  -     Urine culture         Patient to have repeat beta hcg drawn today as well has transvaginal ultrasound to look for possible ectopic pregnancy.   Continue to trend beta-hcg 3x a week. Patient in beta book.    Urinalysis with culture sent due to increased urinary frequency.     RTC in 2 weeks.     Citlali Herrera M.D.  OBGYN PGY-2      I have reviewed the Resident's progress note.  I have personally interviewed and examined the patient at bedside and agree with the findings as documented.  All patient questions answered.  -- DEON Morris M.D.    - Strict pain and bleeding precautions given.    Date HCG Comments   7/31 33    8/3 188    8/5 pending

## 2020-08-06 ENCOUNTER — TELEPHONE (OUTPATIENT)
Dept: OBSTETRICS AND GYNECOLOGY | Facility: CLINIC | Age: 25
End: 2020-08-06

## 2020-08-06 ENCOUNTER — HOSPITAL ENCOUNTER (EMERGENCY)
Facility: OTHER | Age: 25
Discharge: HOME | End: 2020-08-06
Attending: EMERGENCY MEDICINE
Payer: MEDICAID

## 2020-08-06 ENCOUNTER — ANESTHESIA EVENT (OUTPATIENT)
Dept: SURGERY | Facility: OTHER | Age: 25
End: 2020-08-06
Payer: MEDICAID

## 2020-08-06 ENCOUNTER — HOSPITAL ENCOUNTER (OUTPATIENT)
Dept: PREADMISSION TESTING | Facility: OTHER | Age: 25
Discharge: HOME | End: 2020-08-06
Attending: OBSTETRICS & GYNECOLOGY
Payer: MEDICAID

## 2020-08-06 VITALS
BODY MASS INDEX: 26.66 KG/M2 | TEMPERATURE: 99 F | BODY MASS INDEX: 26.66 KG/M2 | RESPIRATION RATE: 18 BRPM | SYSTOLIC BLOOD PRESSURE: 125 MMHG | HEIGHT: 65 IN | WEIGHT: 160 LBS | OXYGEN SATURATION: 95 % | HEART RATE: 87 BPM | HEART RATE: 102 BPM | HEIGHT: 65 IN | SYSTOLIC BLOOD PRESSURE: 126 MMHG | DIASTOLIC BLOOD PRESSURE: 79 MMHG | WEIGHT: 160 LBS | DIASTOLIC BLOOD PRESSURE: 67 MMHG | TEMPERATURE: 99 F | OXYGEN SATURATION: 97 %

## 2020-08-06 DIAGNOSIS — O26.90 ABNORMAL PREGNANCY, ANTEPARTUM: Primary | ICD-10-CM

## 2020-08-06 DIAGNOSIS — O00.90 ECTOPIC PREGNANCY WITHOUT INTRAUTERINE PREGNANCY, UNSPECIFIED LOCATION: Primary | ICD-10-CM

## 2020-08-06 LAB
BACTERIA UR CULT: NORMAL
BILIRUB UR QL STRIP: NEGATIVE
CLARITY UR REFRACT.AUTO: CLEAR
COLOR UR AUTO: YELLOW
GLUCOSE UR QL STRIP: NEGATIVE
HCG INTACT+B SERPL-ACNC: 182 MIU/ML
HGB UR QL STRIP: ABNORMAL
KETONES UR QL STRIP: NEGATIVE
LEUKOCYTE ESTERASE UR QL STRIP: NEGATIVE
MICROSCOPIC COMMENT: NORMAL
NITRITE UR QL STRIP: NEGATIVE
PH UR STRIP: 7 [PH] (ref 5–8)
PROT UR QL STRIP: NEGATIVE
RBC #/AREA URNS AUTO: 0 /HPF (ref 0–4)
SP GR UR STRIP: 1 (ref 1–1.03)
URN SPEC COLLECT METH UR: ABNORMAL

## 2020-08-06 PROCEDURE — 99282 EMERGENCY DEPT VISIT SF MDM: CPT | Mod: ,,, | Performed by: OBSTETRICS & GYNECOLOGY

## 2020-08-06 PROCEDURE — 99282 PR EMERGENCY DEPT VISIT,LEVEL II: ICD-10-PCS | Mod: ,,, | Performed by: OBSTETRICS & GYNECOLOGY

## 2020-08-06 PROCEDURE — 99281 EMR DPT VST MAYX REQ PHY/QHP: CPT

## 2020-08-06 RX ORDER — ACETAMINOPHEN 500 MG
1000 TABLET ORAL
Status: CANCELLED | OUTPATIENT
Start: 2020-08-06 | End: 2020-08-06

## 2020-08-06 RX ORDER — SODIUM CHLORIDE, SODIUM LACTATE, POTASSIUM CHLORIDE, CALCIUM CHLORIDE 600; 310; 30; 20 MG/100ML; MG/100ML; MG/100ML; MG/100ML
INJECTION, SOLUTION INTRAVENOUS CONTINUOUS
Status: CANCELLED | OUTPATIENT
Start: 2020-08-06

## 2020-08-06 RX ORDER — ALBUTEROL SULFATE 2.5 MG/.5ML
2.5 SOLUTION RESPIRATORY (INHALATION)
Status: CANCELLED | OUTPATIENT
Start: 2020-08-06 | End: 2020-08-06

## 2020-08-06 RX ORDER — LIDOCAINE HYDROCHLORIDE 10 MG/ML
0.5 INJECTION, SOLUTION EPIDURAL; INFILTRATION; INTRACAUDAL; PERINEURAL ONCE
Status: CANCELLED | OUTPATIENT
Start: 2020-08-06 | End: 2020-08-06

## 2020-08-06 RX ORDER — PREGABALIN 50 MG/1
50 CAPSULE ORAL ONCE
Status: CANCELLED | OUTPATIENT
Start: 2020-08-06 | End: 2020-08-06

## 2020-08-06 NOTE — ED TRIAGE NOTES
Pt sent from PCP for further evaluation of ectopic pregnancy. Pt is supposed to have sx ken to remove left tube, pt reports calling provider for increase in pain and vaginal bleeding. Upon arrival pt reports mild left side pelvic cramping. Pt denies dizziness, fever, chills,n/v/d, sob, cp.

## 2020-08-06 NOTE — ED PROVIDER NOTES
"Encounter Date: 2020       History     Chief Complaint   Patient presents with    Ectopic Pregnancy     pt states that she has sx scheduled tomorrow for tube removal and that her pain was getting worse so they told her to come in but she "feels I can wait till tomorrow"       Patient is a 25-year-old female, , presenting to the emergency department for evaluation of an ectopic pregnancy.  Patient states she has a known left-sided ectopic pregnancy, reports a history of 1 left side also.  She states she is scheduled for surgery tomorrow to remove her to.  She states she contacted her provider today who asked her about pain and bleeding.  She admits that she had a mild increase of bleeding in some mild pain in her back and was told to come to the ED for immediate evaluation.  She denies any severe pain.  She has not taken any medication.  She denies any heavy bleeding.This is the extent of the patient's complaints at this time.       The history is provided by the patient.     Review of patient's allergies indicates:   Allergen Reactions    Ciprofloxacin      Patient stated that when taking this medication she burns and itches and upon getting it via IV she can see the redness travel up her arm.     Past Medical History:   Diagnosis Date    History of ectopic pregnancy 2018    History of kidney stones     History of PID 10/21/2013    History of pyelonephritis      Past Surgical History:   Procedure Laterality Date    DILATION AND CURETTAGE OF UTERUS  2018    MOUTH SURGERY       Family History   Problem Relation Age of Onset    Diabetes Neg Hx     Hypertension Neg Hx     Stroke Neg Hx     Heart disease Neg Hx      Social History     Tobacco Use    Smoking status: Current Every Day Smoker     Packs/day: 0.25     Years: 6.00     Pack years: 1.50     Types: Cigarettes    Smokeless tobacco: Never Used   Substance Use Topics    Alcohol use: Not Currently     Alcohol/week: 10.0 standard drinks     " Types: 5 Glasses of wine, 5 Shots of liquor per week    Drug use: No     Review of Systems   Constitutional: Negative for activity change, appetite change, chills, fatigue and fever.   HENT: Negative for congestion, rhinorrhea and sore throat.    Eyes: Negative for photophobia and visual disturbance.   Respiratory: Negative for cough, shortness of breath and wheezing.    Cardiovascular: Negative for chest pain.   Gastrointestinal: Positive for abdominal pain. Negative for diarrhea, nausea and vomiting.   Genitourinary: Positive for vaginal bleeding. Negative for dysuria, hematuria and urgency.   Musculoskeletal: Negative for back pain, myalgias and neck pain.   Skin: Negative for color change and wound.   Neurological: Negative for weakness and headaches.   Psychiatric/Behavioral: Negative for agitation and confusion.       Physical Exam     Initial Vitals [08/06/20 1353]   BP Pulse Resp Temp SpO2   125/79 87 18 98.7 °F (37.1 °C) 97 %      MAP       --         Physical Exam    Nursing note and vitals reviewed.  Constitutional: Vital signs are normal. She appears well-developed and well-nourished. She is not diaphoretic.  Non-toxic appearance. She does not have a sickly appearance. She does not appear ill. No distress.   Well-appearing,  female unaccompanied the emergency room.  She is smiling on exam, no acute distress.   HENT:   Head: Normocephalic and atraumatic.   Right Ear: External ear normal.   Left Ear: External ear normal.   Nose: Nose normal.   Mouth/Throat: Oropharynx is clear and moist.   Eyes: Conjunctivae and EOM are normal.   Neck: Normal range of motion. Neck supple.   Abdominal: Soft. She exhibits no distension. There is no abdominal tenderness. There is no rebound and no guarding.   No significant tenderness to palpation abdomen.  No rebound or guarding.   Musculoskeletal: Normal range of motion.   Neurological: She is alert and oriented to person, place, and time.   Skin: Skin is  warm.   Psychiatric: She has a normal mood and affect. Her behavior is normal. Judgment and thought content normal.         ED Course   Procedures  Labs Reviewed - No data to display          Medical Decision Making:   Initial Assessment:     Urgent evaluation a 25-year-old female, , presenting to the emergency department for evaluation of an ectopic pregnancy.  Patient is afebrile, nontoxic appearing, hemodynamically stable.  Abdomen is soft nontender.  Patient appears comfortable, smiling laughing on exam.  Per ED chart review, patient has a known left-sided ectopic pregnancy, scheduled for surgery tomorrow.  Will page OBGYN to evaluate her as that is why they sent her in.    ED Management:    2:10 PM Paged OBGYN    2:14 PM Spoke with OBYGN, will come down to see her.    Patient was seen and evaluated by OBGYN.  Stable for discharge home.  Urged to keep her scheduled surgical appointment tomorrow.The patient was instructed to follow up with a primary care provider in 2 days or to return to the emergency department for worsening symptoms. The treatment plan was discussed with the patient who demonstrated understanding and comfort with plan.        This note was created using [employer] Fluency Direct. There may be typographical errors secondary to dictation.                                    Clinical Impression:     1. Ectopic pregnancy without intrauterine pregnancy, unspecified location              ED Disposition Condition    Discharge Stable        ED Prescriptions     None        Follow-up Information     Follow up With Specialties Details Why Contact Info    Surgery   tomorrow as scheduled     [facility], [place], operated by [facility]  If symptoms worsen [address]  [facility] [id number]  [phone]                                     Grace Juarez PA-C  [phone]

## 2020-08-06 NOTE — DISCHARGE INSTRUCTIONS
Information to Prepare you for your Surgery    PRE-ADMIT TESTING -  [phone]    [address]  [place] [facility]          Your surgery has been scheduled at [facility]. We are pleased to have the opportunity to serve you. For Further Information please call [phone].    On the day of surgery please report to the Information Desk on the 1st floor.    · CONTACT YOUR PHYSICIAN'S OFFICE THE DAY PRIOR TO YOUR SURGERY TO OBTAIN YOUR ARRIVAL TIME.     · The evening before surgery do not eat anything after 9 p.m. ( this includes hard candy, chewing gum and mints).  You may only have GATORADE, POWERADE AND WATER  from 9 p.m. until you leave your home.   DO NOT DRINK ANY LIQUIDS ON THE WAY TO THE HOSPITAL.      SPECIAL MEDICATION INSTRUCTIONS: TAKE medications checked off by the Anesthesiologist on your Medication List.    Angiogram Patients: Take medications as instructed by your physician, including aspirin.     Surgery Patients:    If you take ASPIRIN - Your PHYSICIAN/SURGEON will need to inform you IF/OR when you need to stop taking aspirin prior to your surgery.     Do Not take any medications containing IBUPROFEN.  Do Not Wear any make-up or dark nail polish   (especially eye make-up) to surgery. If you come to surgery with makeup on you will be required to remove the makeup or nail polish.    Do not shave your surgical area at least 5 days prior to your surgery. The surgical prep will be performed at the hospital according to Infection Control regulations.    Leave all valuables at home.   Do Not wear any jewelry or watches, including any metal in body piercings. Jewelry must be removed prior to coming to the hospital.  There is a possibility that rings that are unable to be removed may be cut off if they are on the surgical extremity.    Contact Lens must be removed before surgery. Either do not wear the contact lens or bring a case and solution for  storage.  Please bring a container for eyeglasses or dentures as required.  Bring any paperwork your physician has provided, such as consent forms,  history and physicals, doctor's orders, etc.   Bring comfortable clothes that are loose fitting to wear upon discharge. Take into consideration the type of surgery being performed.  Maintain your diet as advised per your physician the day prior to surgery.      Adequate rest the night before surgery is advised.   Park in the Parking lot behind the hospital or in the [facility] across the street from the parking lot. Parking is complimentary.  If you will be discharged the same day as your procedure, please arrange for a responsible adult to drive you home or to accompany you if traveling by taxi.   YOU WILL NOT BE PERMITTED TO DRIVE OR TO LEAVE THE HOSPITAL ALONE AFTER SURGERY.   If you are being discharged the same day, it is strongly recommended that you arrange for someone to remain with you for the first 24 hrs following your surgery.    The Surgeon will speak to your family/visitor after your surgery regarding the outcome of your surgery and post op care.  The Surgeon may speak to you after your surgery, but there is a possibility you may not remember the details.  Please check with your family members regarding the conversation with the Surgeon.    We strongly recommend whoever is bringing you home be present for discharge instructions.  This will ensure a thorough understanding for your post op home care.    ALL CHILDREN MUST ALWAYS BE ACCOMPANIED BY AN ADULT.    Visitors-Refer to current Visitor policy handouts.    Thank you for your cooperation.  The Staff of [facility].                Bathing Instructions with Hibiclens     Shower the evening before and morning of your procedure with Hibiclens:   Wash your face with water and your regular face wash/soap   Apply Hibiclens directly on your skin or on a wet washcloth and wash  gently. When showering: Move away from the shower stream when applying Hibiclens to avoid rinsing off too soon.   Rinse thoroughly with warm water   Do not dilute Hibiclens         Dry off as usual, do not use any deodorant, powder, body lotions, perfume, after shave or cologne.

## 2020-08-06 NOTE — TELEPHONE ENCOUNTER
Spoke with patient about abnormal beta hCG results. Did not go up in 72 hours. This is consistent with abnormal pregnancy. There is concern for ectopic based on patient history of ectopic and ultrasound finding showing significant scarring of left tube cannot rule out ectopic. Patient is stable right now without pain, just occasional spotting.     Patient counseled on options and she would like to proceed with diagnostic scope w/ possible salpingectomy.     Case request placed for 1230 tomorrow.     Citlali Herrera M.D.  [facility] PGY-2

## 2020-08-06 NOTE — ANESTHESIA PREPROCEDURE EVALUATION
08/06/2020  Heydi Goodman is a 25 y.o., female.    Anesthesia Evaluation    I have reviewed the Patient Summary Reports.    I have reviewed the Nursing Notes. I have reviewed the NPO Status.   I have reviewed the Medications.     Review of Systems  Anesthesia Hx:  No problems with previous Anesthesia  History of prior surgery of interest to airway management or planning: Previous anesthesia: General 5/18 D&C with general anesthesia.  Airway issues documented on chart review include laryngeal mask airway used  Denies Family Hx of Anesthesia complications.    Social:  Smoker    Hematology/Oncology:  Hematology Normal   Oncology Normal    -- Denies Anemia (hct 44):    EENT/Dental:EENT/Dental Normal   Cardiovascular:  Cardiovascular Normal     Pulmonary:  Pulmonary Normal    Renal/:   renal calculi    Hepatic/GI:  Hepatic/GI Normal    Musculoskeletal:  Musculoskeletal Normal    OB/GYN/PEDS:  Poss ectopic   Neurological:  Neurology Normal    Endocrine:  Endocrine Normal    Dermatological:  Skin Normal    Psych:  Psychiatric Normal           Physical Exam  General:  Well nourished    Airway/Jaw/Neck:  Airway Findings: Mouth Opening: Normal Tongue: Normal  General Airway Assessment: Adult  Mallampati: I  TM Distance: Normal, at least 6 cm  Jaw/Neck Findings:  Neck ROM: Normal ROM      Dental:  Dental Findings: In tact             Anesthesia Plan  Type of Anesthesia, risks & benefits discussed:  Anesthesia Type:  general  Patient's Preference:   Intra-op Monitoring Plan: standard ASA monitors  Intra-op Monitoring Plan Comments:   Post Op Pain Control Plan: per primary service following discharge from PACU and multimodal analgesia  Post Op Pain Control Plan Comments:   Induction:   IV  Beta Blocker:         Informed Consent: Patient understands risks and agrees with Anesthesia plan.  Questions  answered. Anesthesia consent signed with patient.  ASA Score: 2     Day of Surgery Review of History & Physical:    H&P update referred to the surgeon.     Anesthesia Plan Notes: Recent CBC WNL        Ready For Surgery From Anesthesia Perspective.

## 2020-08-06 NOTE — CONSULTS
Consult Note  Gynecology      SUBJECTIVE:     History of Present Illness:  Patient is a 25 y.o.  with h/o ectopic pregnancy currently being evaluated for abdominal pain and vaginal bleeding. GYN services requested for evaluation of the patient's pain and bleeding. The patient is scheduled for a diagnostic laparoscopy on 20 for a presumed ectopic pregnancy based on patient history and ultrasound finding showing significant scarring of left tube. The patient reports that she received a call from our clinic today to discuss her surgery and during the discussion she reports some increase in pain and bleeding. Our staff advised the patient to come to the ER to be evaluated for these complaints.   However, during my discussion with the patient she states that her pain is located in her back, which she has had for a while. Additionally, she states her bleeding is darker but not increased in amount. She reports no abdominal pain.      GYN History:  LMP 20, cycles regular prior to that  Last pap 5/10/2018  Denies h/o abnormal paps  Sexually active  h/o STDs - PID in 10/2013      PMHx:   Past Medical History:   Diagnosis Date    History of ectopic pregnancy 2018    History of kidney stones     History of PID 10/21/2013    History of pyelonephritis        PSHx:   Past Surgical History:   Procedure Laterality Date    DILATION AND CURETTAGE OF UTERUS  2018    MOUTH SURGERY         All:   Review of patient's allergies indicates:   Allergen Reactions    Ciprofloxacin      Patient stated that when taking this medication she burns and itches and upon getting it via IV she can see the redness travel up her arm.       Meds: (Not in a hospital admission)      SH:   Social History     Socioeconomic History    Marital status:      Spouse name: Not on file    Number of children: Not on file    Years of education: Not on file    Highest education level: Not on file   Occupational History    Not on file    Social Needs    Financial resource strain: Not on file    Food insecurity     Worry: Not on file     Inability: Not on file    Transportation needs     Medical: Not on file     Non-medical: Not on file   Tobacco Use    Smoking status: Current Every Day Smoker     Packs/day: 0.25     Years: 6.00     Pack years: 1.50     Types: Cigarettes    Smokeless tobacco: Never Used   Substance and Sexual Activity    Alcohol use: Not Currently     Alcohol/week: 10.0 standard drinks     Types: 5 Glasses of wine, 5 Shots of liquor per week    Drug use: No    Sexual activity: Not Currently     Partners: Male     Birth control/protection: None   Lifestyle    Physical activity     Days per week: Not on file     Minutes per session: Not on file    Stress: Not on file   Relationships    Social connections     Talks on phone: Not on file     Gets together: Not on file     Attends Rastafari service: Not on file     Active member of club or organization: Not on file     Attends meetings of clubs or organizations: Not on file     Relationship status: Not on file   Other Topics Concern    Not on file   Social History Narrative    Not on file       FH:   Family History   Problem Relation Age of Onset    Diabetes Neg Hx     Hypertension Neg Hx     Stroke Neg Hx     Heart disease Neg Hx        Review of Systems:  Constitutional: no fever or chills  Respiratory: no cough or shortness of breath  Cardiovascular: no chest pain or palpitations  Gastrointestinal: no nausea or vomiting, tolerating diet, negative for change in bowel habits  Genitourinary: no hematuria or dysuria, negative for urinary incontinence, +vaginal bleeding  Integument/Breast: no rash or pruritis, negative for breast lump, nipple discharge and skin lesion(s)  Hematologic/Lymphatic: no easy bruising or lymphadenopathy     OBJECTIVE:     Temp:  [98.7 °F (37.1 °C)] 98.7 °F (37.1 °C)  Pulse:  [] 87  Resp:  [18] 18  SpO2:  [95 %-97 %] 97 %  BP:  (125-126)/(67-79) 125/79    No results found for this or any previous visit (from the past 24 hour(s)).      Physical Exam:  GEN: No apparent distress. Alert and oriented.   NECK: No thyroid tenderness, no palpable masses or nodules.  CV: Regular rate and rhythm.  LUNGS: Normal work of breathing. No distress.  ABDOMEN: Soft, non tender, non-distended. Good bowel sounds. No guarding or rebound tenderness.  EXT: No erythema, no edema  EXT. GENITALIA: appear normal, no lesions noted  VAGINA: Pink, moist, and well-rugated. Some blood noted in vaginal vault, no lesions noted  CERVIX: Appears normal, no lesions noted, no cervical motion tenderness. No active bleeding from cervical os.  UTERUS: 8 wks size, normal contour, mobile, no fundal tenderness  ADNEXA: No palpable masses, no adnexal tenderness        ASSESSMENT/PLAN:     Assessment:  Patient is a 25 y.o.  with h/o ectopic pregnancy currently being evaluated for abdominal pain and vaginal bleeding.     Plan:  - VSS, afebrile  - Pt without abdominal pain on my exam  - No active bleeding noted from cervical os  - Pt stable for discharge, given strict precautions for when to return to ER  - Pt instructed to present tomorrow for scheduled diagnostic laparoscopy      Bobby Erazo M.D.  OB/GYN PGY-1      Discussed plan with staff who was in agreement.      I have reviewed and concur with the resident's history, physical assessment and plan.    Kyung Naqvi MD  Obstetrics and Gynecology  [facility]

## 2020-08-07 ENCOUNTER — ANESTHESIA (OUTPATIENT)
Dept: SURGERY | Facility: OTHER | Age: 25
End: 2020-08-07
Payer: MEDICAID

## 2020-08-07 ENCOUNTER — HOSPITAL ENCOUNTER (OUTPATIENT)
Facility: OTHER | Age: 25
Discharge: HOME | End: 2020-08-07
Attending: OBSTETRICS & GYNECOLOGY | Admitting: OBSTETRICS & GYNECOLOGY
Payer: MEDICAID

## 2020-08-07 VITALS
HEART RATE: 86 BPM | OXYGEN SATURATION: 98 % | SYSTOLIC BLOOD PRESSURE: 110 MMHG | TEMPERATURE: 99 F | DIASTOLIC BLOOD PRESSURE: 60 MMHG | RESPIRATION RATE: 18 BRPM

## 2020-08-07 DIAGNOSIS — Z01.818 PREOP TESTING: ICD-10-CM

## 2020-08-07 DIAGNOSIS — Z98.890 S/P LAPAROSCOPY: Primary | ICD-10-CM

## 2020-08-07 DIAGNOSIS — Z98.890 S/P DILATION AND CURETTAGE: ICD-10-CM

## 2020-08-07 LAB
ABO + RH BLD: NORMAL
BLD GP AB SCN CELLS X3 SERPL QL: NORMAL
HCG INTACT+B SERPL-ACNC: 92 MIU/ML
SARS-COV-2 RDRP RESP QL NAA+PROBE: NEGATIVE

## 2020-08-07 PROCEDURE — 00940 ANES VAGINAL PX NOS: CPT | Performed by: OBSTETRICS & GYNECOLOGY

## 2020-08-07 PROCEDURE — 37000008 HC ANESTHESIA 1ST 15 MINUTES: Performed by: OBSTETRICS & GYNECOLOGY

## 2020-08-07 PROCEDURE — 63600175 PHARM REV CODE 636 W HCPCS: Performed by: NURSE ANESTHETIST, CERTIFIED REGISTERED

## 2020-08-07 PROCEDURE — 58120 DILATION AND CURETTAGE: CPT | Mod: 51,,, | Performed by: OBSTETRICS & GYNECOLOGY

## 2020-08-07 PROCEDURE — 27201423 OPTIME MED/SURG SUP & DEVICES STERILE SUPPLY: Performed by: OBSTETRICS & GYNECOLOGY

## 2020-08-07 PROCEDURE — 25000003 PHARM REV CODE 250: Performed by: SPECIALIST

## 2020-08-07 PROCEDURE — 25000003 PHARM REV CODE 250: Performed by: NURSE ANESTHETIST, CERTIFIED REGISTERED

## 2020-08-07 PROCEDURE — 94761 N-INVAS EAR/PLS OXIMETRY MLT: CPT | Mod: 59

## 2020-08-07 PROCEDURE — 37000009 HC ANESTHESIA EA ADD 15 MINS: Performed by: OBSTETRICS & GYNECOLOGY

## 2020-08-07 PROCEDURE — 25000242 PHARM REV CODE 250 ALT 637 W/ HCPCS: Performed by: ANESTHESIOLOGY

## 2020-08-07 PROCEDURE — 71000016 HC POSTOP RECOV ADDL HR: Performed by: OBSTETRICS & GYNECOLOGY

## 2020-08-07 PROCEDURE — 25000003 PHARM REV CODE 250: Performed by: ANESTHESIOLOGY

## 2020-08-07 PROCEDURE — 71000033 HC RECOVERY, INTIAL HOUR: Performed by: OBSTETRICS & GYNECOLOGY

## 2020-08-07 PROCEDURE — 84702 CHORIONIC GONADOTROPIN TEST: CPT

## 2020-08-07 PROCEDURE — 94640 AIRWAY INHALATION TREATMENT: CPT | Mod: 59

## 2020-08-07 PROCEDURE — 59151 TREAT ECTOPIC PREGNANCY: CPT | Mod: RT,,, | Performed by: OBSTETRICS & GYNECOLOGY

## 2020-08-07 PROCEDURE — 88305 TISSUE EXAM BY PATHOLOGIST: CPT | Mod: 59 | Performed by: PATHOLOGY

## 2020-08-07 PROCEDURE — 86901 BLOOD TYPING SEROLOGIC RH(D): CPT

## 2020-08-07 PROCEDURE — 36000708 HC OR TIME LEV III 1ST 15 MIN: Performed by: OBSTETRICS & GYNECOLOGY

## 2020-08-07 PROCEDURE — 63600175 PHARM REV CODE 636 W HCPCS: Performed by: SPECIALIST

## 2020-08-07 PROCEDURE — 36415 COLL VENOUS BLD VENIPUNCTURE: CPT

## 2020-08-07 PROCEDURE — 58120 PR DILATION/CURETTAGE,DIAGNOSTIC: ICD-10-PCS | Mod: 51,,, | Performed by: OBSTETRICS & GYNECOLOGY

## 2020-08-07 PROCEDURE — 88305 TISSUE EXAM BY PATHOLOGIST: CPT | Mod: 26,,, | Performed by: PATHOLOGY

## 2020-08-07 PROCEDURE — U0002 COVID-19 LAB TEST NON-CDC: HCPCS

## 2020-08-07 PROCEDURE — 59151 PR RX ECTOP PREG BY SCOPE,RMV TUBE/OVRY: ICD-10-PCS | Mod: RT,,, | Performed by: OBSTETRICS & GYNECOLOGY

## 2020-08-07 PROCEDURE — 63600175 PHARM REV CODE 636 W HCPCS: Performed by: ANESTHESIOLOGY

## 2020-08-07 PROCEDURE — 88305 TISSUE EXAM BY PATHOLOGIST: ICD-10-PCS | Mod: 26,,, | Performed by: PATHOLOGY

## 2020-08-07 PROCEDURE — 71000015 HC POSTOP RECOV 1ST HR: Performed by: OBSTETRICS & GYNECOLOGY

## 2020-08-07 PROCEDURE — 71000039 HC RECOVERY, EACH ADD'L HOUR: Performed by: OBSTETRICS & GYNECOLOGY

## 2020-08-07 PROCEDURE — 36000709 HC OR TIME LEV III EA ADD 15 MIN: Performed by: OBSTETRICS & GYNECOLOGY

## 2020-08-07 RX ORDER — LIDOCAINE HYDROCHLORIDE 20 MG/ML
INJECTION INTRAVENOUS
Status: DISCONTINUED | OUTPATIENT
Start: 2020-08-07 | End: 2020-08-07

## 2020-08-07 RX ORDER — SODIUM CHLORIDE, SODIUM LACTATE, POTASSIUM CHLORIDE, CALCIUM CHLORIDE 600; 310; 30; 20 MG/100ML; MG/100ML; MG/100ML; MG/100ML
INJECTION, SOLUTION INTRAVENOUS CONTINUOUS
Status: DISCONTINUED | OUTPATIENT
Start: 2020-08-07 | End: 2020-08-07 | Stop reason: HOSPADM

## 2020-08-07 RX ORDER — NEOSTIGMINE METHYLSULFATE 1 MG/ML
INJECTION, SOLUTION INTRAVENOUS
Status: DISCONTINUED | OUTPATIENT
Start: 2020-08-07 | End: 2020-08-07

## 2020-08-07 RX ORDER — FENTANYL CITRATE 50 UG/ML
INJECTION, SOLUTION INTRAMUSCULAR; INTRAVENOUS
Status: DISCONTINUED | OUTPATIENT
Start: 2020-08-07 | End: 2020-08-07

## 2020-08-07 RX ORDER — DEXAMETHASONE SODIUM PHOSPHATE 4 MG/ML
INJECTION, SOLUTION INTRA-ARTICULAR; INTRALESIONAL; INTRAMUSCULAR; INTRAVENOUS; SOFT TISSUE
Status: DISCONTINUED | OUTPATIENT
Start: 2020-08-07 | End: 2020-08-07

## 2020-08-07 RX ORDER — HYDROMORPHONE HYDROCHLORIDE 2 MG/ML
0.4 INJECTION, SOLUTION INTRAMUSCULAR; INTRAVENOUS; SUBCUTANEOUS EVERY 5 MIN PRN
Status: DISCONTINUED | OUTPATIENT
Start: 2020-08-07 | End: 2020-08-07 | Stop reason: HOSPADM

## 2020-08-07 RX ORDER — MIDAZOLAM HYDROCHLORIDE 1 MG/ML
INJECTION INTRAMUSCULAR; INTRAVENOUS
Status: DISCONTINUED | OUTPATIENT
Start: 2020-08-07 | End: 2020-08-07

## 2020-08-07 RX ORDER — ONDANSETRON 2 MG/ML
4 INJECTION INTRAMUSCULAR; INTRAVENOUS DAILY PRN
Status: DISCONTINUED | OUTPATIENT
Start: 2020-08-07 | End: 2020-08-07 | Stop reason: HOSPADM

## 2020-08-07 RX ORDER — LIDOCAINE HYDROCHLORIDE 10 MG/ML
0.5 INJECTION, SOLUTION EPIDURAL; INFILTRATION; INTRACAUDAL; PERINEURAL ONCE
Status: DISCONTINUED | OUTPATIENT
Start: 2020-08-07 | End: 2020-08-07 | Stop reason: HOSPADM

## 2020-08-07 RX ORDER — OXYCODONE AND ACETAMINOPHEN 5; 325 MG/1; MG/1
1 TABLET ORAL EVERY 4 HOURS PRN
Qty: 15 TABLET | Refills: 0 | Status: SHIPPED | OUTPATIENT
Start: 2020-08-07

## 2020-08-07 RX ORDER — IBUPROFEN 800 MG/1
800 TABLET ORAL 3 TIMES DAILY
Qty: 30 TABLET | Refills: 0 | OUTPATIENT
Start: 2020-08-07 | End: 2023-11-18

## 2020-08-07 RX ORDER — GLYCOPYRROLATE 0.2 MG/ML
INJECTION INTRAMUSCULAR; INTRAVENOUS
Status: DISCONTINUED | OUTPATIENT
Start: 2020-08-07 | End: 2020-08-07

## 2020-08-07 RX ORDER — OXYCODONE HYDROCHLORIDE 5 MG/1
5 TABLET ORAL
Status: DISCONTINUED | OUTPATIENT
Start: 2020-08-07 | End: 2020-08-07 | Stop reason: HOSPADM

## 2020-08-07 RX ORDER — ROCURONIUM BROMIDE 10 MG/ML
INJECTION, SOLUTION INTRAVENOUS
Status: DISCONTINUED | OUTPATIENT
Start: 2020-08-07 | End: 2020-08-07

## 2020-08-07 RX ORDER — PROPOFOL 10 MG/ML
VIAL (ML) INTRAVENOUS
Status: DISCONTINUED | OUTPATIENT
Start: 2020-08-07 | End: 2020-08-07

## 2020-08-07 RX ORDER — SODIUM CHLORIDE 0.9 % (FLUSH) 0.9 %
3 SYRINGE (ML) INJECTION
Status: DISCONTINUED | OUTPATIENT
Start: 2020-08-07 | End: 2020-08-07 | Stop reason: HOSPADM

## 2020-08-07 RX ORDER — ACETAMINOPHEN 500 MG
1000 TABLET ORAL
Status: COMPLETED | OUTPATIENT
Start: 2020-08-07 | End: 2020-08-07

## 2020-08-07 RX ORDER — ALBUTEROL SULFATE 2.5 MG/.5ML
2.5 SOLUTION RESPIRATORY (INHALATION)
Status: COMPLETED | OUTPATIENT
Start: 2020-08-07 | End: 2020-08-07

## 2020-08-07 RX ORDER — PREGABALIN 50 MG/1
50 CAPSULE ORAL ONCE
Status: COMPLETED | OUTPATIENT
Start: 2020-08-07 | End: 2020-08-07

## 2020-08-07 RX ORDER — MEPERIDINE HYDROCHLORIDE 25 MG/ML
12.5 INJECTION INTRAMUSCULAR; INTRAVENOUS; SUBCUTANEOUS ONCE AS NEEDED
Status: DISCONTINUED | OUTPATIENT
Start: 2020-08-07 | End: 2020-08-07 | Stop reason: HOSPADM

## 2020-08-07 RX ORDER — DIPHENHYDRAMINE HYDROCHLORIDE 50 MG/ML
25 INJECTION INTRAMUSCULAR; INTRAVENOUS EVERY 6 HOURS PRN
Status: DISCONTINUED | OUTPATIENT
Start: 2020-08-07 | End: 2020-08-07 | Stop reason: HOSPADM

## 2020-08-07 RX ADMIN — SODIUM CHLORIDE, SODIUM LACTATE, POTASSIUM CHLORIDE, AND CALCIUM CHLORIDE: 600; 310; 30; 20 INJECTION, SOLUTION INTRAVENOUS at 12:08

## 2020-08-07 RX ADMIN — CARBOXYMETHYLCELLULOSE SODIUM 2 DROP: 2.5 SOLUTION/ DROPS OPHTHALMIC at 12:08

## 2020-08-07 RX ADMIN — SODIUM CHLORIDE, SODIUM LACTATE, POTASSIUM CHLORIDE, AND CALCIUM CHLORIDE: 600; 310; 30; 20 INJECTION, SOLUTION INTRAVENOUS at 02:08

## 2020-08-07 RX ADMIN — FENTANYL CITRATE 100 MCG: 50 INJECTION, SOLUTION INTRAMUSCULAR; INTRAVENOUS at 12:08

## 2020-08-07 RX ADMIN — LIDOCAINE HYDROCHLORIDE 100 MG: 20 INJECTION, SOLUTION INTRAVENOUS at 12:08

## 2020-08-07 RX ADMIN — MIDAZOLAM HYDROCHLORIDE 2 MG: 1 INJECTION, SOLUTION INTRAMUSCULAR; INTRAVENOUS at 12:08

## 2020-08-07 RX ADMIN — ACETAMINOPHEN 1000 MG: 500 TABLET, FILM COATED ORAL at 09:08

## 2020-08-07 RX ADMIN — PROPOFOL 200 MG: 10 INJECTION, EMULSION INTRAVENOUS at 12:08

## 2020-08-07 RX ADMIN — DEXAMETHASONE SODIUM PHOSPHATE 4 MG: 4 INJECTION, SOLUTION INTRAMUSCULAR; INTRAVENOUS at 01:08

## 2020-08-07 RX ADMIN — GLYCOPYRROLATE 0.8 MG: 0.2 INJECTION, SOLUTION INTRAMUSCULAR; INTRAVENOUS at 02:08

## 2020-08-07 RX ADMIN — HYDROMORPHONE HYDROCHLORIDE 0.4 MG: 2 INJECTION, SOLUTION INTRAMUSCULAR; INTRAVENOUS; SUBCUTANEOUS at 03:08

## 2020-08-07 RX ADMIN — PREGABALIN 50 MG: 50 CAPSULE ORAL at 09:08

## 2020-08-07 RX ADMIN — OXYCODONE 5 MG: 5 TABLET ORAL at 03:08

## 2020-08-07 RX ADMIN — NEOSTIGMINE METHYLSULFATE 5 MG: 1 INJECTION INTRAVENOUS at 02:08

## 2020-08-07 RX ADMIN — ALBUTEROL SULFATE 2.5 MG: 2.5 SOLUTION RESPIRATORY (INHALATION) at 10:08

## 2020-08-07 RX ADMIN — FENTANYL CITRATE 50 MCG: 50 INJECTION, SOLUTION INTRAMUSCULAR; INTRAVENOUS at 01:08

## 2020-08-07 RX ADMIN — ROCURONIUM BROMIDE 40 MG: 10 INJECTION, SOLUTION INTRAVENOUS at 12:08

## 2020-08-07 RX ADMIN — PROMETHAZINE HYDROCHLORIDE 6.25 MG: 25 INJECTION INTRAMUSCULAR; INTRAVENOUS at 02:08

## 2020-08-07 NOTE — DISCHARGE SUMMARY
[facility]  Brief Operative Note    Surgery Date: 8/7/2020     Surgeon(s) and Role:     * DEON Morris MD - Primary     * Stephanie Mckeon MD - Resident - Assisting    Pre-op Diagnosis:  Abnormal pregnancy, antepartum [O26.90]    Post-op Diagnosis:  Post-Op Diagnosis Codes:     * Abnormal pregnancy, antepartum [O26.90]    Procedure(s) (LRB):  LAPAROSCOPY, DIAGNOSTIC; ECTOPIC  (N/A)  DILATION AND CURETTAGE, UTERUS (N/A)    Anesthesia: General    Description of the findings of the procedure(s): Small amount of old blood in the pelvis. Filmly adhesions throughout pelvis and from liver edge to anterior abdominal wall, consistent with history of PID. Right tube with presumed ectopic. Left tube tortuous and dilated, adhered to left ovary. Left ovary with small functional cyst. Right salpingectomy performed without difficulty. Dilation and curettage performed with small amount of tissue obtained.    Estimated Blood Loss: minimal         Specimens:   Specimen (12h ago, onward)    None            Discharge Note    OUTCOME: Patient tolerated treatment/procedure well without complication and is now ready for discharge.    DISPOSITION: Home or Self Care    FINAL DIAGNOSIS:  S/P laparoscopy    FOLLOWUP: In clinic    DISCHARGE INSTRUCTIONS:    Discharge Procedure Orders   Pelvic Rest   Order Comments: Nothing in vagina for 6 weeks (no intercourse, douching, tampons, etc.)     No driving until:   Order Comments: No longer taking narcotics  Can press on the brake without pain     Notify your health care provider if you experience any of the following:   Order Comments: Soaking through 1 pad/hour for greater than 2 hours     Notify your health care provider if you experience any of the following:  increased confusion or weakness     Notify your health care provider if you experience any of the following:  persistent dizziness, light-headedness, or visual disturbances     Notify your health care provider  if you experience any of the following:  severe persistent headache     Notify your health care provider if you experience any of the following:  difficulty breathing or increased cough     Notify your health care provider if you experience any of the following:  redness, tenderness, or signs of infection (pain, swelling, redness, odor or green/yellow discharge around incision site)     Notify your health care provider if you experience any of the following:  severe uncontrolled pain     Notify your health care provider if you experience any of the following:  persistent nausea and vomiting or diarrhea     Notify your health care provider if you experience any of the following:  temperature >100.4     Other restrictions (specify):   Order Comments: Take stool softeners (200mg) twice daily and Miralax as needed for prevention of constipation     Weight bearing restrictions (specify):   Order Comments: Nothing heavier than 15 pounds       Stephanie Mckeon MD   OBGYN, PGY-3

## 2020-08-07 NOTE — INTERVAL H&P NOTE
The patient has been examined and the H&P has been reviewed:    I concur with the findings and changes have been noted since the H&P was written: repeat bHCG did not rise appropriately, pt with continued pain/bleeding, concern for ectopic given scarring visualized on US. Plan for diagnostic laparoscopy and possible left salpingectomy, possible D&C if no intraabdominal pathology    Surgery risks, benefits and alternative options discussed and understood by patient/family.          There are no hospital problems to display for this patient.

## 2020-08-07 NOTE — TRANSFER OF CARE
Anesthesia Transfer of Care Note    Patient: Heydi Goodman    Procedure(s) Performed: Procedure(s) (LRB):  LAPAROSCOPY, DIAGNOSTIC; ECTOPIC  (N/A)  DILATION AND CURETTAGE, UTERUS (N/A)    Patient location: PACU    Anesthesia Type: general    Transport from OR: Transported from OR on 2-3 L/min O2 by NC with adequate spontaneous ventilation    Post pain: adequate analgesia    Post assessment: no apparent anesthetic complications    Post vital signs: stable    Level of consciousness: awake, alert and oriented    Nausea/Vomiting: no nausea/vomiting    Complications: none    Transfer of care protocol was followed      Last vitals:   Visit Vitals  /78   Pulse 85   Temp 36.5 °C (97.7 °F)   Resp 18   SpO2 98%   Breastfeeding No

## 2020-08-07 NOTE — OP NOTE
OPERATIVE REPORT    Date of procedure: 08/07/2020    PREOPERATIVE DIAGNOSIS  1. Pregnancy of unknown location, concern for ectopic pregnancy  2. H/o ectopic pregnancy    POSTOPERATIVE DIAGNOSIS  same    PROCEDURE:  1. Diagnostic laparoscopy  2. Right salpingectomy  3. Dilation and curettage     SURGEON: DEON Morris MD    ASSISTANT: Stephanie Mckeon MD - PGY3    ANESTHESIA: General    COMPLICATIONS: None    EBL: 50 cc    IV FLUIDS: 1000 cc    URINE OUTPUT: see anesthesia report    FINDINGS: Small amount of old blood in the pelvis. Filmly adhesions throughout pelvis and from liver edge to anterior abdominal wall, consistent with history of PID. Right tube with presumed ectopic. Left tube tortuous and dilated, adhered to left ovary. Left ovary with small functional cyst. Right salpingectomy performed without difficulty. Dilation and curettage performed with small amount of tissue obtained.    PROCEDURE:   Patient was taken to the operating room where general anesthesia was administered and found to be adequate.  She was prepped and draped in the dorsal lithotomy position, both vaginally and abdominally. A surgical timeout was performed with patient's name, date of birth, allergies, and procedure to be performed verbalized. All OR staff were in agreement. No preoperative antibiotics were administered as none were indicated. A read was placed. A weighted sterile speculum was placed in the vagina.  The anterior lip of the cervix was grasped with a single tooth tenaculum.  The uterus was sounded to approximately 7 cm.  A Anevia uterine manipulator was placed in the endometrial cavity.    Gloves were changed.  A Veress needle was inserted into the umbilicus under tenting of the anterior abdominal wall.  Placement into the peritoneal cavity was confirmed via saline drop test.  The abdomen was insufflated to 15mm Hg using Carbon dioxide.  A 5 mm infraraumbilical skin incision was made with the scalpel.  A 5 mm Optiview  trocar was advanced through this incision.  Excellent hemostasis was noted.  The patient was placed in deep Trendelenburg.  A 5 mm left lateral skin incision was made with a scalpel and a 5 mm trocar was advanced through this incision under visualization of the camera.  A 5 mm right lateral skin incision was made with the scalpel.  A 5 mm trocar was advanced through this incision under visualization of the camera.  Excellent hemostasis was noted.      An abdominal survey revealed diffuse filmy adhesions throughout the pelvis and thin filmy adhesions from the liver edge to the anterior abdominal wall, consistent with the patient's history of PID.  Attention was then turned to the pelvis. There was a small amount of old blood in the pelvis.  There were filmy adhesions from the the uterus to the anterior abdominal wall.  The left tube appeared dilated and tortuous was adhered to the left ovary.  The fimbria appeared blunted.  The right tube had what appeared to be a small ectopic pregnancy in the distal portion of the tube close to the fimbria.  Some of the filmy adhesions from the right tube to the ovary were taken down with the LigaSure.  This allowed for the salpingectomy to be performed.  The tube was elevated, and the LigaSure was used to cauterize the mesosalpinx underneath the tube to the level of the cornua.  The tube was transected at the level of the cornua.  The umbilical port was replaced with a 10-mm port.  The tube was brought in to the 10-mm port, and the entire port was removed with the tube. The fascia was then closed with the Alberto Hylton.    The pelvis was copiously irrigated and suctioned.  Excellent hemostasis was noted. Bilateral ureteral peristalsis was seen.     Due to significant scarring in pelvis, no definitive IUP or ectopic visualized on imaging, small size of likely ectopic in right tube, and tortuous, dilated left tube, decision was made to also performed a dilation and curettage. The  hulka was removed. The cervix was serially dilated to allow for passage of a sharp curette. The endometrium was scraped until a gritty consistency was felt in all 4 quadrants. A small amount of tissue was collected.    Attention was turned to the anterior abdominal wall. The abdomen was deflated and all trocars were removed.  The skin was closed with 4-0 Monocryl in a subcuticular fashion.  Sponge, lap, and needle counts were correct x 2. The read was removed. The patient was taken to the recovery room in stable condition    Stephanie Mckeon MD   OBGYN, PGY-3      I was present and scrubbed for the entire procedure and performed the key portions of the case.  I agree with the procedure description as documented.  -- DEON Morris M.D.

## 2020-08-07 NOTE — DISCHARGE INSTRUCTIONS
Abdominal Laparoscopy Discharge Instructions      Activity  · Limit your activity for 4-6 weeks.  · Dont lift anything heavier than 5-10 pounds.  · Avoid strenuous activities, such as mowing the lawn, vacuuming, or playing sports.  · Limit your activity to short, slow walks. Gradually increase your pace and distance as you feel able.  · Listen to your body. If an activity causes pain, stop.  · Rest when you are tired.  · Dont have sexual intercourse or use tampons or douches until your doctor says its safe to do so.    Home Care  · Always keep your incision clean and dry  · Shower as instructed in 24 hours. You may wash your incision gently with mild soap and warm water and pat dry.  Avoid tub baths, hot tubs and swimming pools until seen by your physician for a post-op follow up.  · If you have steri strips they should fall off in 7-10 days.    · If you have band aids covering your incisions change daily or when soiled.  The band aids may be removed post op day 1 if they are clean and dry.  · Take your medication exactly as instructed by your doctor.  · Return to your diet as you feel able. Eat a healthy, well-balanced diet.  · Avoid constipation.  ¨ Use laxatives, stool softeners, or enemas as directed by your doctor.  ¨ Eat more high-fiber foods.  ¨ Drink 6-8 glasses of water every day, unless directed otherwise.  Follow-Up  Make a follow-up appointment as directed by our staff.       When to Call Your Doctor  Call your doctor right away if you have any of the following:  · Fever above 101.5°F  (38.6°C) or chills  · Bright red vaginal bleeding or a foul-smelling discharge  · Vaginal bleeding that soaks more than one sanitary pad per hour  · Trouble urinating or burning sensation when you urinate  · Severe abdominal pain or bloating  · Redness, swelling, or drainage at your incision site  · Shortness of breath        ________________________________________________________________  FOR EMERGENCIES:  If any  unusual problems or difficulties occur, contact                 ________________________ or the resident at [phone] (page ) or at the Clinic office, [phone].

## 2020-08-08 NOTE — PLAN OF CARE
Heydi Goodman has met all discharge criteria from Phase II. Vital Signs are stable, ambulating  without difficulty. Discharge instructions given, patient verbalized understanding. Discharged from facility via wheelchair in stable condition.

## 2020-08-10 ENCOUNTER — TELEPHONE (OUTPATIENT)
Dept: OBSTETRICS AND GYNECOLOGY | Facility: CLINIC | Age: 25
End: 2020-08-10

## 2020-08-10 NOTE — TELEPHONE ENCOUNTER
----- Message from Dayami Gregorio sent at 8/10/2020  9:54 AM CDT -----  Patient is calling in to speak w/ staff. Patient contact number [phone]. Patient needs a follow up appt and a work note for her 2 week.

## 2020-08-10 NOTE — TELEPHONE ENCOUNTER
Call returned message sent to provider concerning rtw note appt scheduled already pt informed of date and time

## 2020-08-12 ENCOUNTER — TELEPHONE (OUTPATIENT)
Dept: OBSTETRICS AND GYNECOLOGY | Facility: CLINIC | Age: 25
End: 2020-08-12

## 2020-08-12 NOTE — LETTER
August 12, 2020    Heydi Goodman  [address]  [place] [address]         [facility]  [address] [place] [address]  Phone: [phone] August 12, 2020     Patient: Heydi Goodman   YOB: 1995   Date of Visit: 8/12/2020       To Whom It May Concern:    It is my medical opinion that Heydi Goodman may return to work on 8/24/20.    If you have any questions or concerns, please don't hesitate to call.    Sincerely,        Kayla Conteh MD

## 2020-08-12 NOTE — TELEPHONE ENCOUNTER
----- Message from Cyndi Garzon MA sent at 8/12/2020 10:12 AM CDT -----  Please advise, pt of Dr. Morris had a D&C last week.  ----- Message -----  From: Patricia Luna  Sent: 8/12/2020   8:47 AM CDT  To: [facility] Staff    Pt needs a doctor letter for her job for 2wks. Pt can be reached at [phone].

## 2020-08-17 LAB
FINAL PATHOLOGIC DIAGNOSIS: NORMAL
GROSS: NORMAL

## 2020-09-01 ENCOUNTER — TELEPHONE (OUTPATIENT)
Dept: OBSTETRICS AND GYNECOLOGY | Facility: CLINIC | Age: 25
End: 2020-09-01

## 2020-09-01 NOTE — TELEPHONE ENCOUNTER
Post-op call to check for signs or symptoms of COVID-19 since surgery on 8/7.  The patient denied having any signs or symptoms of COVID-19.

## 2020-11-12 NOTE — DISCHARGE INSTRUCTIONS
"    Kidney Infection (Adult, Female)    An infection in one or both kidneys is called "pyelonephritis." It usually happens when bacteria (or rarely, viruses, fungi, or other disease-causing organisms) get into the kidney. The bacteria (or other disease-causing organisms) can enter the kidneys from the bladder or blood traveling from other parts of the body. A kidney infection can become serious. It can cause severe illness, scarring of the kidneys, or kidney failure if not treated properly.  Common causes for this problem include:  · Not keeping the genital area clean and dry, which promotes the growth of bacteria  · Wiping back to front which drags bacteria from the rectum toward the urinary opening (urethra)  · Wearing tight pants or underwear (this lets moisture build up in the genital area, which helps bacteria grow)  · Holding urine in for long periods of time  · Dehydration  Kidney infections can cause symptoms similar to a bladder infection. Symptoms include:  · Pain (or burning) when urinating  · Having to urinate more often than usual  · Blood in the urine (pink or red)  · Abdominal pain or discomfort, usually in the lower abdomen  · Pain in the side or back  · Pain above the pubic bone  · Fever or chills  · Vomiting  · Loss of appetite  Treatment is oral antibiotics, or in more severe cases, intramuscular or IV antibiotics. These are started right away and may be changed once urine culture results determine the infecting organisms. Treatment helps prevent a more serious kidney infection.  Medicines  Medicines can help in the treatment of a bladder infection:  · Take antibiotics until they are used up, even if you feel better. It is important to finish them to make sure the infection is gone.  · Unless another medicine was prescribed, you can use over-the-counter medicines for pain, fever, or discomfort. If you have chronic liver of kidney disease, talk with your healthcare provider before using these " Impression: Ophthalmoplegic migraine, not intractable: G43. B0. Plan: Discussed status in detail. VF 30-2 today, shallow central loss OD, questionable enlarged disc OS (normal disc appearance), no neuro pattern defect OU. Prior MRI performed with Dr. Socrates Jordan and pt had hyperintensity in white matter. Pt is waiting for neurology appt with [facility] in [place]. medicines. Also talk with your provider if you've ever had a stomach ulcer or gastrointestinal (GI) bleeding, or are taking blood thinners.  Home care  The following are general care guidelines:  · Stay home from work or school. Rest in bed until your fever breaks and you are feeling better, or as advised by your healthcare provider.  · Drink lots of fluid unless you must restrict fluids for other medical reasons. This will force the medicine into your urinary system and flush the bacteria out of your body. Ask your healthcare provider how much you should drink.  · Avoid sex until you have finished all of your medicine and your symptoms are gone.  · Avoid caffeine, alcohol, and spicy foods. These foods may irritate the kidneys and bladder.  · Avoid taking bubble baths. Sensitivity to the chemicals in bubble baths can irritate the urethra.  · Make sure you wipe from front to back after using the toilet.  · Wear loose cloths and cotton underwear.  Prevention  These self-care steps can help prevent future infections:  · Drink plenty of fluids to prevent dehydration and flush out the bladder. Do this unless you must restrict fluids for other health reasons, or your healthcare provider told you not to.  · Proper cleaning after going to the bathroom in important. Make sure you wipe from front to back after using the toilet.  · Urinate more often. Don't try to hold urine in for a long time.  · Avoid tight-fitting pants and underwear.  · Improve your diet to prevent constipation. Eat more fruits, vegetables, and fiber. Eat less junk and fatty foods. Constipation can make a urinary tract infection more likely. Talk with your healthcare provider if you have trouble with bowel movements.  · Urinate right after intercourse to flush out the bladder.  Follow-up care  Follow up with your healthcare provider, or as advised. Additional testing may be needed to make sure the infection has cleared. Close follow-up and further testing  is very important to find the cause and to prevent future infections.  If a urine culture was done, you will be contacted if your treatment needs to be changed. If directed, you may call to find out the results.  If you had an X-ray, CT scan, or other diagnostic test, you will be notified of any new findings that may affect your care.  Call 911  Call 911 if any of the following occur:  · Trouble breathing  · Fainting or loss of consciousness  · Rapid or very slow heart rate  · Weakness, dizziness, or fainting  · Difficulty arousing or confusion  When to seek medical advice  Call your healthcare provider right away if any of these occur:  · Fever 100.4°F (38°C) or higher after 48 hours of treatment, or as advised by your healthcare provider  · Not feeling better within 1 to 2 days after starting antibiotics  · Any symptom that continues after 3 days of treatment  · Increasing pain in the stomach, back, side, or groin area  · Repeated vomiting  · Not able to take prescribed medicine due to nausea or another reason  · Bloody, dark-colored, or foul smelling urine  · Trouble urinating or decreased urine output  · No urine for 8 hours, no tears when crying, sunken eyes, or dry mouth  Date Last Reviewed: 10/1/2016  © 2098-4606 [employer]. [address] [place] [address], [place] [address]. All rights reserved. This information is not intended as a substitute for professional medical care. Always follow your healthcare professional's instructions.

## 2021-04-15 ENCOUNTER — PATIENT MESSAGE (OUTPATIENT)
Dept: RESEARCH | Facility: HOSPITAL | Age: 26
End: 2021-04-15

## 2021-11-02 ENCOUNTER — HOSPITAL ENCOUNTER (EMERGENCY)
Facility: HOSPITAL | Age: 26
Discharge: HOME | End: 2021-11-02
Attending: EMERGENCY MEDICINE
Payer: MEDICAID

## 2021-11-02 VITALS
BODY MASS INDEX: 26.12 KG/M2 | TEMPERATURE: 99 F | HEIGHT: 64 IN | RESPIRATION RATE: 18 BRPM | OXYGEN SATURATION: 98 % | WEIGHT: 153 LBS | SYSTOLIC BLOOD PRESSURE: 131 MMHG | DIASTOLIC BLOOD PRESSURE: 75 MMHG | HEART RATE: 72 BPM

## 2021-11-02 DIAGNOSIS — H10.32 ACUTE CONJUNCTIVITIS OF LEFT EYE, UNSPECIFIED ACUTE CONJUNCTIVITIS TYPE: Primary | ICD-10-CM

## 2021-11-02 LAB
ALBUMIN SERPL BCP-MCNC: 4.2 G/DL (ref 3.5–5.2)
ALP SERPL-CCNC: 105 U/L (ref 55–135)
ALT SERPL W/O P-5'-P-CCNC: 15 U/L (ref 10–44)
ANION GAP SERPL CALC-SCNC: 9 MMOL/L (ref 8–16)
ANION GAP SERPL CALC-SCNC: 9 MMOL/L (ref 8–16)
AST SERPL-CCNC: 18 U/L (ref 10–40)
B-HCG UR QL: NEGATIVE
BILIRUB SERPL-MCNC: 0.4 MG/DL (ref 0.1–1)
BUN SERPL-MCNC: 9 MG/DL (ref 6–20)
BUN SERPL-MCNC: 9 MG/DL (ref 6–20)
CALCIUM SERPL-MCNC: 10.1 MG/DL (ref 8.7–10.5)
CALCIUM SERPL-MCNC: 10.1 MG/DL (ref 8.7–10.5)
CHLORIDE SERPL-SCNC: 105 MMOL/L (ref 95–110)
CHLORIDE SERPL-SCNC: 105 MMOL/L (ref 95–110)
CO2 SERPL-SCNC: 26 MMOL/L (ref 23–29)
CO2 SERPL-SCNC: 26 MMOL/L (ref 23–29)
CREAT SERPL-MCNC: 0.7 MG/DL (ref 0.5–1.4)
CREAT SERPL-MCNC: 0.7 MG/DL (ref 0.5–1.4)
CTP QC/QA: YES
EST. GFR  (AFRICAN AMERICAN): >60 ML/MIN/1.73 M^2
EST. GFR  (AFRICAN AMERICAN): >60 ML/MIN/1.73 M^2
EST. GFR  (NON AFRICAN AMERICAN): >60 ML/MIN/1.73 M^2
EST. GFR  (NON AFRICAN AMERICAN): >60 ML/MIN/1.73 M^2
GLUCOSE SERPL-MCNC: 84 MG/DL (ref 70–110)
GLUCOSE SERPL-MCNC: 84 MG/DL (ref 70–110)
POTASSIUM SERPL-SCNC: 4.2 MMOL/L (ref 3.5–5.1)
POTASSIUM SERPL-SCNC: 4.2 MMOL/L (ref 3.5–5.1)
PROT SERPL-MCNC: 7.5 G/DL (ref 6–8.4)
SODIUM SERPL-SCNC: 140 MMOL/L (ref 136–145)
SODIUM SERPL-SCNC: 140 MMOL/L (ref 136–145)

## 2021-11-02 PROCEDURE — 25000003 PHARM REV CODE 250: Performed by: EMERGENCY MEDICINE

## 2021-11-02 PROCEDURE — 25500020 PHARM REV CODE 255: Performed by: EMERGENCY MEDICINE

## 2021-11-02 PROCEDURE — 80053 COMPREHEN METABOLIC PANEL: CPT | Performed by: EMERGENCY MEDICINE

## 2021-11-02 PROCEDURE — 81025 URINE PREGNANCY TEST: CPT | Performed by: EMERGENCY MEDICINE

## 2021-11-02 PROCEDURE — 99285 EMERGENCY DEPT VISIT HI MDM: CPT | Mod: 25

## 2021-11-02 RX ORDER — ERYTHROMYCIN 5 MG/G
OINTMENT OPHTHALMIC
Qty: 3.5 G | Refills: 0 | Status: SHIPPED | OUTPATIENT
Start: 2021-11-02

## 2021-11-02 RX ORDER — PROPARACAINE HYDROCHLORIDE 5 MG/ML
1 SOLUTION/ DROPS OPHTHALMIC
Status: COMPLETED | OUTPATIENT
Start: 2021-11-02 | End: 2021-11-02

## 2021-11-02 RX ORDER — ERYTHROMYCIN 5 MG/G
OINTMENT OPHTHALMIC
Status: COMPLETED | OUTPATIENT
Start: 2021-11-02 | End: 2021-11-02

## 2021-11-02 RX ADMIN — ERYTHROMYCIN 1 INCH: 5 OINTMENT OPHTHALMIC at 11:11

## 2021-11-02 RX ADMIN — PROPARACAINE HYDROCHLORIDE 1 DROP: 5 SOLUTION/ DROPS OPHTHALMIC at 07:11

## 2021-11-02 RX ADMIN — FLUORESCEIN SODIUM 1 EACH: 1 STRIP OPHTHALMIC at 07:11

## 2021-11-02 RX ADMIN — IOHEXOL 50 ML: 350 INJECTION, SOLUTION INTRAVENOUS at 10:11

## 2021-11-04 ENCOUNTER — OFFICE VISIT (OUTPATIENT)
Dept: OPTOMETRY | Facility: CLINIC | Age: 26
End: 2021-11-04
Payer: MEDICAID

## 2021-11-04 DIAGNOSIS — L03.213 PRESEPTAL CELLULITIS OF LEFT EYE: Primary | ICD-10-CM

## 2021-11-04 PROCEDURE — 99999 PR PBB SHADOW E&M-EST. PATIENT-LVL II: ICD-10-PCS | Mod: PBBFAC,,, | Performed by: OPTOMETRIST

## 2021-11-04 PROCEDURE — 92002 PR EYE EXAM, NEW PATIENT,INTERMED: ICD-10-PCS | Mod: S$PBB,,, | Performed by: OPTOMETRIST

## 2021-11-04 PROCEDURE — 92002 INTRM OPH EXAM NEW PATIENT: CPT | Mod: S$PBB,,, | Performed by: OPTOMETRIST

## 2021-11-04 PROCEDURE — 99212 OFFICE O/P EST SF 10 MIN: CPT | Mod: PBBFAC | Performed by: OPTOMETRIST

## 2021-11-04 PROCEDURE — 99999 PR PBB SHADOW E&M-EST. PATIENT-LVL II: CPT | Mod: PBBFAC,,, | Performed by: OPTOMETRIST

## 2021-11-04 RX ORDER — AMOXICILLIN AND CLAVULANATE POTASSIUM 500; 125 MG/1; MG/1
1 TABLET, FILM COATED ORAL 3 TIMES DAILY
Qty: 30 TABLET | Refills: 0 | Status: SHIPPED | OUTPATIENT
Start: 2021-11-04 | End: 2021-11-14

## 2021-11-04 RX ORDER — NEOMYCIN SULFATE, POLYMYXIN B SULFATE AND DEXAMETHASONE 3.5; 10000; 1 MG/ML; [USP'U]/ML; MG/ML
1 SUSPENSION/ DROPS OPHTHALMIC 4 TIMES DAILY
Qty: 5 ML | Refills: 0 | Status: SHIPPED | OUTPATIENT
Start: 2021-11-04 | End: 2021-11-14

## 2021-11-08 ENCOUNTER — TELEPHONE (OUTPATIENT)
Dept: OPHTHALMOLOGY | Facility: CLINIC | Age: 26
End: 2021-11-08
Payer: MEDICAID

## 2021-11-08 ENCOUNTER — TELEPHONE (OUTPATIENT)
Dept: OPTOMETRY | Facility: CLINIC | Age: 26
End: 2021-11-08
Payer: MEDICAID

## 2021-12-07 ENCOUNTER — HOSPITAL ENCOUNTER (EMERGENCY)
Facility: HOSPITAL | Age: 26
Discharge: HOME | End: 2021-12-07
Attending: EMERGENCY MEDICINE
Payer: MEDICAID

## 2021-12-07 VITALS
BODY MASS INDEX: 25.61 KG/M2 | RESPIRATION RATE: 18 BRPM | HEIGHT: 64 IN | HEART RATE: 84 BPM | OXYGEN SATURATION: 99 % | TEMPERATURE: 99 F | DIASTOLIC BLOOD PRESSURE: 66 MMHG | SYSTOLIC BLOOD PRESSURE: 124 MMHG | WEIGHT: 150 LBS
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
## 2021-12-07 DIAGNOSIS — S39.012A BACK STRAIN, INITIAL ENCOUNTER: Primary | ICD-10-CM

## 2021-12-07 PROCEDURE — 99284 EMERGENCY DEPT VISIT MOD MDM: CPT

## 2021-12-07 PROCEDURE — 25000003 PHARM REV CODE 250: Performed by: EMERGENCY MEDICINE

## 2021-12-07 PROCEDURE — 99284 EMERGENCY DEPT VISIT MOD MDM: CPT | Mod: ,,, | Performed by: EMERGENCY MEDICINE

## 2021-12-07 PROCEDURE — 99284 PR EMERGENCY DEPT VISIT,LEVEL IV: ICD-10-PCS | Mod: ,,, | Performed by: EMERGENCY MEDICINE

## 2021-12-07 RX ORDER — BACLOFEN 20 MG/1
20 TABLET ORAL 3 TIMES DAILY
Qty: 9 TABLET | Refills: 0 | Status: SHIPPED | OUTPATIENT
Start: 2021-12-07 | End: 2021-12-10

## 2021-12-07 RX ORDER — LIDOCAINE 50 MG/G
1 PATCH TOPICAL DAILY
Qty: 30 PATCH | Refills: 3 | Status: SHIPPED | OUTPATIENT
Start: 2021-12-07

## 2021-12-07 RX ORDER — MELOXICAM 7.5 MG/1
7.5 TABLET ORAL DAILY
Qty: 5 TABLET | Refills: 0 | Status: SHIPPED | OUTPATIENT
Start: 2021-12-07

## 2021-12-07 RX ORDER — MELOXICAM 7.5 MG/1
7.5 TABLET ORAL DAILY
Status: DISCONTINUED | OUTPATIENT
Start: 2021-12-07 | End: 2021-12-07 | Stop reason: HOSPADM

## 2021-12-07 RX ORDER — LIDOCAINE 50 MG/G
1 PATCH TOPICAL
Status: DISCONTINUED | OUTPATIENT
Start: 2021-12-07 | End: 2021-12-07 | Stop reason: HOSPADM

## 2021-12-07 RX ORDER — BACLOFEN 10 MG/1
20 TABLET ORAL
Status: COMPLETED | OUTPATIENT
Start: 2021-12-07 | End: 2021-12-07

## 2021-12-07 RX ADMIN — BACLOFEN 20 MG: 10 TABLET ORAL at 11:12

## 2021-12-07 RX ADMIN — LIDOCAINE 5% 1 PATCH: 700 PATCH TOPICAL at 12:12

## 2021-12-07 RX ADMIN — MELOXICAM 7.5 MG: 7.5 TABLET ORAL at 12:12

## 2021-12-29 ENCOUNTER — PES CALL (OUTPATIENT)
Dept: ADMINISTRATIVE | Facility: CLINIC | Age: 26
End: 2021-12-29
Payer: MEDICAID

## 2022-05-02 ENCOUNTER — PATIENT MESSAGE (OUTPATIENT)
Dept: OBSTETRICS AND GYNECOLOGY | Facility: CLINIC | Age: 27
End: 2022-05-02
Payer: MEDICAID

## 2022-05-10 ENCOUNTER — TELEPHONE (OUTPATIENT)
Dept: OBSTETRICS AND GYNECOLOGY | Facility: CLINIC | Age: 27
End: 2022-05-10
Payer: MEDICAID

## 2022-05-10 NOTE — TELEPHONE ENCOUNTER
----- Message from DEON Morris MD sent at 5/10/2022  2:43 PM CDT -----  I haven't seen her recently.  See what she wants.  You can schedule her an appointment.  If it is about infertility, just make sure she knows that it will just be counseling.  I don't do infertility treatments.  Thanks.  ----- Message -----  From: Cyndi Garzon MA  Sent: 5/10/2022  10:06 AM CDT  To: DEON Morris MD    Do you know anything about this?  ----- Message -----  From: Patricia Luna  Sent: 5/10/2022   9:47 AM CDT  To: [facility] Staff    Pt needs to talk to nurse about her fertility treatment. Pt can be reached at [phone].

## 2022-06-07 ENCOUNTER — OFFICE VISIT (OUTPATIENT)
Dept: OBSTETRICS AND GYNECOLOGY | Facility: CLINIC | Age: 27
End: 2022-06-07
Payer: MEDICAID

## 2022-06-07 VITALS
HEIGHT: 64 IN | DIASTOLIC BLOOD PRESSURE: 70 MMHG | WEIGHT: 156.31 LBS | BODY MASS INDEX: 26.69 KG/M2 | SYSTOLIC BLOOD PRESSURE: 114 MMHG

## 2022-06-07 DIAGNOSIS — Z31.69 ENCOUNTER FOR PRECONCEPTION CONSULTATION: ICD-10-CM

## 2022-06-07 DIAGNOSIS — Z11.59 ENCOUNTER FOR HEPATITIS C SCREENING TEST FOR LOW RISK PATIENT: ICD-10-CM

## 2022-06-07 DIAGNOSIS — N97.9 INFERTILITY, FEMALE, SECONDARY: Primary | ICD-10-CM

## 2022-06-07 DIAGNOSIS — Z87.59 HISTORY OF ECTOPIC PREGNANCY: ICD-10-CM

## 2022-06-07 PROCEDURE — 3074F PR MOST RECENT SYSTOLIC BLOOD PRESSURE < 130 MM HG: ICD-10-PCS | Mod: CPTII,,, | Performed by: OBSTETRICS & GYNECOLOGY

## 2022-06-07 PROCEDURE — 99999 PR PBB SHADOW E&M-EST. PATIENT-LVL III: CPT | Mod: PBBFAC,,, | Performed by: OBSTETRICS & GYNECOLOGY

## 2022-06-07 PROCEDURE — 1159F MED LIST DOCD IN RCRD: CPT | Mod: CPTII,,, | Performed by: OBSTETRICS & GYNECOLOGY

## 2022-06-07 PROCEDURE — 1160F PR REVIEW ALL MEDS BY PRESCRIBER/CLIN PHARMACIST DOCUMENTED: ICD-10-PCS | Mod: CPTII,,, | Performed by: OBSTETRICS & GYNECOLOGY

## 2022-06-07 PROCEDURE — 3074F SYST BP LT 130 MM HG: CPT | Mod: CPTII,,, | Performed by: OBSTETRICS & GYNECOLOGY

## 2022-06-07 PROCEDURE — 3008F PR BODY MASS INDEX (BMI) DOCUMENTED: ICD-10-PCS | Mod: CPTII,,, | Performed by: OBSTETRICS & GYNECOLOGY

## 2022-06-07 PROCEDURE — 1159F PR MEDICATION LIST DOCUMENTED IN MEDICAL RECORD: ICD-10-PCS | Mod: CPTII,,, | Performed by: OBSTETRICS & GYNECOLOGY

## 2022-06-07 PROCEDURE — 99999 PR PBB SHADOW E&M-EST. PATIENT-LVL III: ICD-10-PCS | Mod: PBBFAC,,, | Performed by: OBSTETRICS & GYNECOLOGY

## 2022-06-07 PROCEDURE — 1160F RVW MEDS BY RX/DR IN RCRD: CPT | Mod: CPTII,,, | Performed by: OBSTETRICS & GYNECOLOGY

## 2022-06-07 PROCEDURE — 99213 PR OFFICE/OUTPT VISIT, EST, LEVL III, 20-29 MIN: ICD-10-PCS | Mod: S$PBB,,, | Performed by: OBSTETRICS & GYNECOLOGY

## 2022-06-07 PROCEDURE — 99213 OFFICE O/P EST LOW 20 MIN: CPT | Mod: PBBFAC,PN | Performed by: OBSTETRICS & GYNECOLOGY

## 2022-06-07 PROCEDURE — 3008F BODY MASS INDEX DOCD: CPT | Mod: CPTII,,, | Performed by: OBSTETRICS & GYNECOLOGY

## 2022-06-07 PROCEDURE — 3078F DIAST BP <80 MM HG: CPT | Mod: CPTII,,, | Performed by: OBSTETRICS & GYNECOLOGY

## 2022-06-07 PROCEDURE — 3078F PR MOST RECENT DIASTOLIC BLOOD PRESSURE < 80 MM HG: ICD-10-PCS | Mod: CPTII,,, | Performed by: OBSTETRICS & GYNECOLOGY

## 2022-06-07 PROCEDURE — 99213 OFFICE O/P EST LOW 20 MIN: CPT | Mod: S$PBB,,, | Performed by: OBSTETRICS & GYNECOLOGY

## 2022-06-07 NOTE — PROGRESS NOTES
"Past medical, surgical, social, family, and obstetric histories; medications; prior records and results; and available outside records were reviewed and updated in the EMR.  Pertinent findings were noted below.    Reason for Visit   Consult (FERTILITY)    HPI   27 y.o. female     Patient's last menstrual period was 2022.     Pt reports she has been trying to conceive with her current partner for one year without success.    Reports regular menstrual cycles that last ~ 4days. Denies AUB, denies abnormal vaginal discharge, dyspareunia.    Reports her partner has never had a child (he has completed a semen analysis work up-showed decreased motility)    States she is working with [employer] and would like to try IVF and continue to try naturally as well   Pt has presented today with lab requests for her infertility workup from [facility]    Pt with h/o 2 ectopic pregnancies.   -ectopic pregnancy in left tube treated with methotrexate  -ectopic pregnancy in right tube treated with laparoscopic right salpingectomy     Contraception: None  Pap: No result found, NILM (2018)  Mammogram: N/A    Exam   /70   Ht 5' 4" (1.626 m)   Wt 70.9 kg (156 lb 4.9 oz)   LMP 2022   BMI 26.83 kg/m²     Physical Exam  Constitutional:       General: She is not in acute distress.     Appearance: Normal appearance. She is well-developed.   Pulmonary:      Effort: No respiratory distress.       Assessment and Plan   Infertility, female, secondary  -     TSH; Future; Expected date: 2022  -     Prolactin; Future; Expected date: 2022  -     Hemoglobin A1C; Future; Expected date: 2022  -     Testosterone, Free; Future; Expected date: 2022  -     DHEA; Future; Expected date: 2022  -     Estradiol; Future; Expected date: 2022  -     Antimullerian Hormone (AMH); Future; Expected date: 2022  -     Follicle Stimulating Hormone; Future; Expected date: 2022  -     Progesterone; " Future; Expected date: 06/07/2022  -     LUTEINIZING HORMONE; Future; Expected date: 06/07/2022  -     US Pelvis Comp with Transvag NON-OB (xpd; Future; Expected date: 06/07/2022    History of ectopic pregnancy  -     US Pelvis Comp with Transvag NON-OB (xpd; Future; Expected date: 06/07/2022    Encounter for preconception consultation  -     CBC Auto Differential; Future; Expected date: 06/07/2022  -     Hemoglobin A1C; Future; Expected date: 06/07/2022  -     Rubella Antibody, IgG; Future; Expected date: 06/07/2022  -     Type & Screen; Future; Expected date: 06/07/2022  -     Hepatitis B Surface Antigen; Future; Expected date: 06/07/2022  -     HIV 1/2 Ag/Ab (4th Gen); Future; Expected date: 06/07/2022  -     Hepatitis C Antibody; Future; Expected date: 06/07/2022  -     RPR; Future; Expected date: 06/07/2022  -     Varicella Zoster Antibody, IgG; Future; Expected date: 06/07/2022  -     Comprehensive Metabolic Panel; Future; Expected date: 06/07/2022    Encounter for hepatitis C screening test for low risk patient  -     Hepatitis C Antibody; Future; Expected date: 06/07/2022       Labs and imaging requested by [facility] fertility ordered as above   Pt counseled on risks of recurrent ectopic pregnancy with history and s/p right salpingectomy   Pt confirms understanding   Paperwork for HSG study filled out    Rosie Perrin MD  OBGYN PGY-1    I have reviewed the Resident's progress note.  I have personally interviewed and examined the patient at bedside and agree with the findings as documented.  All patient questions answered.  -- DEON Morris M.D.

## 2022-10-15 ENCOUNTER — HOSPITAL ENCOUNTER (EMERGENCY)
Facility: HOSPITAL | Age: 27
Discharge: HOME | End: 2022-10-15
Attending: EMERGENCY MEDICINE
Payer: MEDICAID

## 2022-10-15 VITALS
BODY MASS INDEX: 27.18 KG/M2 | TEMPERATURE: 98 F | WEIGHT: 159.19 LBS | DIASTOLIC BLOOD PRESSURE: 86 MMHG | RESPIRATION RATE: 18 BRPM | OXYGEN SATURATION: 100 % | HEART RATE: 84 BPM | SYSTOLIC BLOOD PRESSURE: 132 MMHG | HEIGHT: 64 IN

## 2022-10-15 DIAGNOSIS — S89.92XA INJURY OF LEFT KNEE, INITIAL ENCOUNTER: ICD-10-CM

## 2022-10-15 DIAGNOSIS — S49.92XA INJURY OF LEFT SHOULDER, INITIAL ENCOUNTER: ICD-10-CM
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
## 2022-10-15 LAB
B-HCG UR QL: NEGATIVE
CTP QC/QA: YES

## 2022-10-15 PROCEDURE — 63600175 PHARM REV CODE 636 W HCPCS: Performed by: PHYSICIAN ASSISTANT

## 2022-10-15 PROCEDURE — 99284 PR EMERGENCY DEPT VISIT,LEVEL IV: ICD-10-PCS | Mod: ,,, | Performed by: PHYSICIAN ASSISTANT

## 2022-10-15 PROCEDURE — 99284 EMERGENCY DEPT VISIT MOD MDM: CPT | Mod: 25

## 2022-10-15 PROCEDURE — 96372 THER/PROPH/DIAG INJ SC/IM: CPT | Performed by: PHYSICIAN ASSISTANT

## 2022-10-15 PROCEDURE — 99284 EMERGENCY DEPT VISIT MOD MDM: CPT | Mod: ,,, | Performed by: PHYSICIAN ASSISTANT

## 2022-10-15 PROCEDURE — 81025 URINE PREGNANCY TEST: CPT | Performed by: PHYSICIAN ASSISTANT

## 2022-10-15 RX ORDER — KETOROLAC TROMETHAMINE 30 MG/ML
15 INJECTION, SOLUTION INTRAMUSCULAR; INTRAVENOUS
Status: COMPLETED | OUTPATIENT
Start: 2022-10-15 | End: 2022-10-15

## 2022-10-15 RX ADMIN — KETOROLAC TROMETHAMINE 15 MG: 30 INJECTION, SOLUTION INTRAMUSCULAR; INTRAVENOUS at 12:10

## 2022-10-15 NOTE — DISCHARGE INSTRUCTIONS
Take over-the-counter Tylenol and Ibuprofen for further management of your pain. Use the provided ACE wrap for support of your knee pain. You may also use ice-packs.     Follow-up with your primary care provider for further evaluation.     Return to the emergency room for new, worsening, or concerning symptoms.

## 2022-10-15 NOTE — Clinical Note
"Heydi Merritt" Rex was seen and treated in our emergency department on 10/15/2022.  She may return to work on 10/18/2022.       If you have any questions or concerns, please don't hesitate to call.       RN    "
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
## 2022-10-15 NOTE — Clinical Note
"Heydi Merritt" Rex was seen and treated in our emergency department on 10/15/2022.  She may return to work on 10/15/2022.       If you have any questions or concerns, please don't hesitate to call.       RN    "

## 2023-05-11 NOTE — ED NOTES
"Subjective   Andie Saavedra is a 57 y.o. female who presents for   She says that she feels her chest tightness might be from  is more pulmonary issues  Quit smoking 15 years ago  Feels she does not get a full breath  Was unable to have stress test due to inability to obtain IV access  Will get coronary cT    Her arm is doing ok as far as lymphedema    She has an appt for weight loss medicine in June    She is motivated to loose weight.     Review of Systems    Objective   BP (!) 144/104 (BP Location: Left arm, Patient Position: Sitting, BP Cuff Size: Large adult)   Pulse 72   Resp 18   Ht 1.676 m (5' 6\")   Wt (!) 150 kg (331 lb)   BMI 53.42 kg/m²    Physical Exam  Visit Vitals  /70 (BP Location: Left arm, Patient Position: Sitting)   Pulse 72   Resp 18   Ht 1.676 m (5' 6\")   Wt (!) 150 kg (331 lb)   BMI 53.42 kg/m²   Smoking Status Former   BSA 2.64 m²      GEN: NAD  HEENT: normal  NECK: no adenopathy, no thyroid enlargment  LUNGS: CTAB  CV: reg S1/S2 no murmurs  EXT: no leg edema   Assessment/Plan   Problem List Items Addressed This Visit    None    1 prediabetes: reman on metformin.  2. Neuropathy: remain on pregabalin.  3. Chest pain: order Cardiac CT to evaluate for possible CAD  4 Class 3 obesity with comorbidity.       " Lab arrived to draw blood cultures.

## 2023-05-25 ENCOUNTER — TELEPHONE (OUTPATIENT)
Dept: ORTHOPEDICS | Facility: CLINIC | Age: 28
End: 2023-05-25

## 2023-05-25 NOTE — TELEPHONE ENCOUNTER
Returned call to patient, LM  advising to see what she is being scheduled for and confirm medicaid insurance. Gave \[facility]\""/[facility] ortho clinic phone number for insurance restriction.   ----- Message from Arabella Rao sent at 5/25/2023  3:44 PM CDT -----  Regarding: RE: Appt  Contact: Pt [phone]  GE I am no longer on the phone with pt and we can't do call backs   ----- Message -----  From: Jenna Freedman MA  Sent: 5/25/2023   3:43 PM CDT  To: Arabella Rao  Subject: RE: Appt                                         What is the patient getting scheduled for?     We do not have any availability in our schedule due to insurance restrictions at this time but can refer you elsewhere. If pt is willing to go to [place] or [facility], there is limited NP Medicaid availability there. Give the pt our Medicaid expansion line at [phone] or [facility] [phone]  ----- Message -----  From: Arabella Rao  Sent: 5/25/2023   3:38 PM CDT  To: [facility] Orth[facility] Clinical Support  Subject: Appt                                             Pt is calling to schedule a appt please call

## 2023-06-20 ENCOUNTER — PATIENT MESSAGE (OUTPATIENT)
Dept: RESEARCH | Facility: HOSPITAL | Age: 28
End: 2023-06-20

## 2024-01-12 ENCOUNTER — TELEPHONE (OUTPATIENT)
Dept: CARDIOLOGY | Facility: CLINIC | Age: 29
End: 2024-01-12

## 2024-01-12 PROBLEM — U07.1 COVID-19 VIRUS INFECTION: Status: ACTIVE | Noted: 2024-01-12

## 2024-01-12 NOTE — TELEPHONE ENCOUNTER
Left detailed voice mail for patient, your missed new patient appointment has been rescheduled with Dr. Perez at 9:40 on 03/08/2024.  If you are unable to keep this appointment and need to reschedule, our call back is [phone].

## 2024-01-12 NOTE — TELEPHONE ENCOUNTER
----- Message from Maria Dolores Aldana sent at 1/12/2024  2:41 PM CST -----  Type:  Appointment Request         Name of Caller:pt  When is the first available appointment?No access  Symptoms:chest pains, palpitations   Would the patient rather a call back or a response via [facility]? call  Best Call Back Number:[phone]   Additional Information: Pt would like to reschedule appt due to testing positive for Covid. Please call back to reschedule appt at later date. Thank You.

## 2024-06-10 ENCOUNTER — OFFICE VISIT (OUTPATIENT)
Dept: OBSTETRICS AND GYNECOLOGY | Facility: CLINIC | Age: 29
End: 2024-06-10
Payer: COMMERCIAL

## 2024-06-10 VITALS
HEIGHT: 64 IN | HEART RATE: 70 BPM | DIASTOLIC BLOOD PRESSURE: 78 MMHG | WEIGHT: 171.94 LBS | BODY MASS INDEX: 29.35 KG/M2 | SYSTOLIC BLOOD PRESSURE: 132 MMHG

## 2024-06-10 DIAGNOSIS — Z01.419 ENCOUNTER FOR GYNECOLOGICAL EXAMINATION (GENERAL) (ROUTINE) WITHOUT ABNORMAL FINDINGS: Primary | ICD-10-CM

## 2024-06-10 DIAGNOSIS — Z11.51 ENCOUNTER FOR SCREENING FOR HUMAN PAPILLOMAVIRUS (HPV): ICD-10-CM

## 2024-06-10 DIAGNOSIS — E34.9 DISORDER OF ENDOCRINE SYSTEM: ICD-10-CM

## 2024-06-10 DIAGNOSIS — Z12.4 ENCOUNTER FOR SCREENING FOR MALIGNANT NEOPLASM OF CERVIX: ICD-10-CM

## 2024-06-10 DIAGNOSIS — Z20.2 POSSIBLE EXPOSURE TO STD: ICD-10-CM

## 2024-06-10 DIAGNOSIS — N91.4 SECONDARY OLIGOMENORRHEA: ICD-10-CM

## 2024-06-10 PROCEDURE — 99395 PREV VISIT EST AGE 18-39: CPT | Mod: S$GLB,,, | Performed by: OBSTETRICS & GYNECOLOGY

## 2024-06-10 PROCEDURE — 3075F SYST BP GE 130 - 139MM HG: CPT | Mod: CPTII,S$GLB,, | Performed by: OBSTETRICS & GYNECOLOGY

## 2024-06-10 PROCEDURE — 88175 CYTOPATH C/V AUTO FLUID REDO: CPT | Performed by: OBSTETRICS & GYNECOLOGY

## 2024-06-10 PROCEDURE — 3008F BODY MASS INDEX DOCD: CPT | Mod: CPTII,S$GLB,, | Performed by: OBSTETRICS & GYNECOLOGY

## 2024-06-10 PROCEDURE — 87591 N.GONORRHOEAE DNA AMP PROB: CPT | Performed by: OBSTETRICS & GYNECOLOGY

## 2024-06-10 PROCEDURE — 1160F RVW MEDS BY RX/DR IN RCRD: CPT | Mod: CPTII,S$GLB,, | Performed by: OBSTETRICS & GYNECOLOGY

## 2024-06-10 PROCEDURE — 99999 PR PBB SHADOW E&M-EST. PATIENT-LVL III: CPT | Mod: PBBFAC,,, | Performed by: OBSTETRICS & GYNECOLOGY

## 2024-06-10 PROCEDURE — 87624 HPV HI-RISK TYP POOLED RSLT: CPT | Performed by: OBSTETRICS & GYNECOLOGY

## 2024-06-10 PROCEDURE — 3078F DIAST BP <80 MM HG: CPT | Mod: CPTII,S$GLB,, | Performed by: OBSTETRICS & GYNECOLOGY

## 2024-06-10 PROCEDURE — 87491 CHLMYD TRACH DNA AMP PROBE: CPT | Performed by: OBSTETRICS & GYNECOLOGY

## 2024-06-10 PROCEDURE — 1159F MED LIST DOCD IN RCRD: CPT | Mod: CPTII,S$GLB,, | Performed by: OBSTETRICS & GYNECOLOGY

## 2024-06-11 LAB
C TRACH DNA SPEC QL NAA+PROBE: NOT DETECTED
N GONORRHOEA DNA SPEC QL NAA+PROBE: NOT DETECTED

## 2024-06-11 NOTE — PROGRESS NOTES
"Past medical, surgical, social, family, and obstetric histories; medications; prior records and results; and available outside records were reviewed and updated in the EMR.  Pertinent findings were noted below.    Reason for Visit   Well Woman    HPI   29 y.o. female     New patient: No    Menopausal: No    History of abnormal paps: DENIES  Abnormal or postmenopausal bleeding:  SKIPPING CYCLES.  ONLY 2 CYCLES OVER THE PAST 5 MONTHS  History of abnormal mammograms:N/A   Family history of breast or ovarian cancer: DENIES  Any breast masses, pain, skin changes, or nipple discharge: DENIES  Possible recent STD exposure: DESIRES TESTING  Contraception: None    Pap: 2018, NILM  Mammogram: N/A  Allergies: Ciprofloxacin    Review of Systems   Constitutional:  Negative for fever.   Respiratory:  Negative for shortness of breath.    Cardiovascular:  Negative for chest pain.   Gastrointestinal:  Negative for abdominal pain, nausea and vomiting.   Endocrine: Negative for hot flashes.   Genitourinary:  Positive for menstrual problem.   Integumentary:  Negative for breast mass, nipple discharge and breast skin changes.   Neurological:  Negative for headaches.   Hematological:  Does not bruise/bleed easily.   Psychiatric/Behavioral:  Negative for depression.    Breast: Negative for mass, mastodynia, nipple discharge and skin changes      Exam   /78   Pulse 70   Ht 5' 4" (1.626 m)   Wt 78 kg (171 lb 15.3 oz)   LMP 2024 (Exact Date)   BMI 29.52 kg/m²     Physical Exam  Constitutional:       General: She is not in acute distress.     Appearance: Normal appearance.     Genitourinary:    Vulva, urethra, bladder, vagina, uterus and urethral meatus normal.   The external female genitalia was normal.   Right adnexum displays no tenderness and no fullness. Left adnexum displays no tenderness and no fullness. Cervix is normal. Uterus is not enlarged and not tender. Breasts:     Right: No mass, nipple discharge, skin " change or tenderness.      Left: No mass, nipple discharge, skin change or tenderness.   Neck:      Thyroid: No thyroid mass.   Cardiovascular:      Rate and Rhythm: Normal rate.   Pulmonary:      Effort: Pulmonary effort is normal. No respiratory distress.   Abdominal:      Palpations: Abdomen is soft. There is no hepatomegaly, splenomegaly or mass.      Tenderness: There is no abdominal tenderness.      Hernia: No hernia is present.   Musculoskeletal:      Cervical back: Normal range of motion and neck supple.      Right lower leg: No edema.      Left lower leg: No edema.   Lymphadenopathy:      Upper Body:      Right upper body: No axillary adenopathy.      Left upper body: No axillary adenopathy.   Neurological:      Mental Status: She is alert and oriented to person, place, and time.   Skin:     Findings: No rash.   Psychiatric:         Mood and Affect: Mood and affect normal.   Vitals reviewed. Exam conducted with a chaperone present.       Assessment and Plan   Encounter for gynecological examination (general) (routine) without abnormal findings  -     Liquid-Based Pap Smear, Screening  -     HPV High Risk Genotypes, PCR    Encounter for screening for malignant neoplasm of cervix  -     Liquid-Based Pap Smear, Screening    Encounter for screening for human papillomavirus (HPV)  -     HPV High Risk Genotypes, PCR    Possible exposure to STD  -     Treponema Pallidium Antibodies IgG, IgM; Future; Expected date: 06/10/2024  -     C. trachomatis/N. gonorrhoeae by AMP DNA  -     HIV 1/2 Ag/Ab (4th Gen); Future; Expected date: 06/10/2024    Secondary oligomenorrhea  -     TSH; Future; Expected date: 06/10/2024  -     Follicle Stimulating Hormone; Future; Expected date: 06/10/2024  -     Prolactin; Future; Expected date: 06/10/2024  -     Hemoglobin A1C; Future; Expected date: 06/10/2024    Disorder of endocrine system  -     Hemoglobin A1C; Future; Expected date: 06/10/2024      Annual exam  Breast and pelvic exam:  NORMAL  Patient was counseled on ASCCP guidelines for cervical cytology screening  Cervical screening: CO-TESTING DONE TODAY  Patient was counseled on current recommendations for breast cancer screening  Mammogram screening: N/A  STD testing: LABS AS ABOVE  LABS AS ABOVE FOR OLIGOMENORRHEA.  F/U IN ABOUT 4 WEEKS.    She was counseled to follow up with her PCP for other routine health maintenance

## 2024-06-14 LAB
HPV HR 12 DNA SPEC QL NAA+PROBE: NEGATIVE
HPV16 AG SPEC QL: NEGATIVE
HPV18 DNA SPEC QL NAA+PROBE: NEGATIVE

## 2024-06-18 LAB
FINAL PATHOLOGIC DIAGNOSIS: NORMAL
Lab: NORMAL

## 2025-01-13 ENCOUNTER — OFFICE VISIT (OUTPATIENT)
Dept: FAMILY MEDICINE | Facility: CLINIC | Age: 30
End: 2025-01-13
Payer: MEDICAID

## 2025-01-13 VITALS
WEIGHT: 173.06 LBS | SYSTOLIC BLOOD PRESSURE: 120 MMHG | DIASTOLIC BLOOD PRESSURE: 86 MMHG | HEIGHT: 64 IN | HEART RATE: 80 BPM | TEMPERATURE: 98 F | OXYGEN SATURATION: 98 % | BODY MASS INDEX: 29.55 KG/M2 | RESPIRATION RATE: 20 BRPM

## 2025-01-13 DIAGNOSIS — R68.89 FLU-LIKE SYMPTOMS: Primary | ICD-10-CM

## 2025-01-13 LAB
INFLUENZA A, MOLECULAR: NOT DETECTED
INFLUENZA B, MOLECULAR: NOT DETECTED
RSV AG BY MOLECULAR METHOD: NOT DETECTED
SARS-COV-2 RNA RESP QL NAA+PROBE: NOT DETECTED

## 2025-01-13 PROCEDURE — 99213 OFFICE O/P EST LOW 20 MIN: CPT | Mod: S$PBB,,,

## 2025-01-13 PROCEDURE — 3079F DIAST BP 80-89 MM HG: CPT | Mod: CPTII,,,

## 2025-01-13 PROCEDURE — 0241U SARS-COV2 (COVID) WITH FLU/RSV BY PCR: CPT

## 2025-01-13 PROCEDURE — 3008F BODY MASS INDEX DOCD: CPT | Mod: CPTII,,,

## 2025-01-13 PROCEDURE — 3074F SYST BP LT 130 MM HG: CPT | Mod: CPTII,,,

## 2025-01-13 PROCEDURE — 1159F MED LIST DOCD IN RCRD: CPT | Mod: CPTII,,,

## 2025-01-13 PROCEDURE — 99213 OFFICE O/P EST LOW 20 MIN: CPT | Mod: PBBFAC,PO

## 2025-01-13 PROCEDURE — 99999 PR PBB SHADOW E&M-EST. PATIENT-LVL III: CPT | Mod: PBBFAC,,,

## 2025-01-13 RX ORDER — PROMETHAZINE HYDROCHLORIDE AND DEXTROMETHORPHAN HYDROBROMIDE 6.25; 15 MG/5ML; MG/5ML
5 SYRUP ORAL EVERY 4 HOURS PRN
Qty: 118 ML | Refills: 0 | Status: SHIPPED | OUTPATIENT
Start: 2025-01-13 | End: 2025-01-23

## 2025-01-13 NOTE — PROGRESS NOTES
Family Medicine      Patient Name: Heydi Goodman  MRN: [id number]  : 1995    PCP: Hayley Donald MD       HPI      Heydi Goodman is a 30 y.o. female with multiple medical diagnoses as listed in the medical history and problem list that presents for   Chief Complaint   Patient presents with    Fever     Pt states she had 102 fever    Nausea    Sore Throat    Headache    Generalized Body Aches       HPI    History of Present Illness    Patient presents today with flu-like symptoms. She reports fever of 102°F with associated chills and sweats. She also experiences throat swelling, nausea, and vomiting. History of two ectopic pregnancies.      ROS:  General: +fever, +chills, -fatigue, -weight gain, -weight loss  Eyes: -vision changes, -redness, -discharge  ENT: -ear pain, +nasal congestion, -sore throat  Cardiovascular: -chest pain, -palpitations, -lower extremity edema  Respiratory: -cough, -shortness of breath  Gastrointestinal: -abdominal pain, +nausea, +vomiting, -diarrhea, -constipation, -blood in stool  Genitourinary: -dysuria, -hematuria, -frequency  Musculoskeletal: -joint pain, -muscle pain  Skin: -rash, -lesion  Neurological: -headache, -dizziness, -numbness, -tingling  Psychiatric: -anxiety, -depression, -sleep difficulty  Endocrine: +excessive sweating              History      Past Medical History:  Past Medical History:   Diagnosis Date    History of ectopic pregnancy 2018    History of kidney stones     History of PID 10/21/2013    History of pyelonephritis        Past Surgical History:  Past Surgical History:   Procedure Laterality Date    DIAGNOSTIC LAPAROSCOPY N/A 2020    Procedure: LAPAROSCOPY, DIAGNOSTIC; ECTOPIC ;  Surgeon: EDON Morris MD;  Location: [facility];  Service: OB/GYN;  Laterality: N/A;    DILATION AND CURETTAGE OF UTERUS      DILATION AND CURETTAGE OF UTERUS N/A 2020    Procedure: DILATION AND CURETTAGE, UTERUS;   "Surgeon: DEON Morris MD;  Location: [facility];  Service: OB/GYN;  Laterality: N/A;    MOUTH SURGERY         Social History:  Social History     Socioeconomic History    Marital status: Legally    Tobacco Use    Smoking status: Former     Current packs/day: 0.25     Average packs/day: 0.3 packs/day for 6.0 years (1.5 ttl pk-yrs)     Types: Cigarettes    Smokeless tobacco: Current   Substance and Sexual Activity    Alcohol use: Yes     Alcohol/week: 10.0 standard drinks of alcohol     Types: 5 Glasses of wine, 5 Shots of liquor per week     Comment: everyday "start with 1/2 pint but sometimes more"    Drug use: No    Sexual activity: Not Currently     Partners: Male     Birth control/protection: None       Family History:  Family History   Problem Relation Name Age of Onset    Diabetes Neg Hx      Hypertension Neg Hx      Stroke Neg Hx      Heart disease Neg Hx         Allergies and Medications: (updated and reviewed)  Review of patient's allergies indicates:   Allergen Reactions    Ciprofloxacin      Patient stated that when taking this medication she burns and itches and upon getting it via IV she can see the redness travel up her arm.     Current Outpatient Medications   Medication Sig Dispense Refill    ibuprofen (ADVIL,MOTRIN) 800 MG tablet Take 1 tablet (800 mg total) by mouth every 6 (six) hours as needed for Pain. 20 tablet 0    promethazine-dextromethorphan (PROMETHAZINE-DM) 6.25-15 mg/5 mL Syrp Take 5 mLs by mouth every 4 (four) hours as needed (cough and congestion). 118 mL 0     No current facility-administered medications for this visit.       Patient Care Team:  Hayley Donald MD as PCP - General (Family Medicine)         Exam      Review of Systems:  (as noted above)  Review of Systems    Physical Exam:  Physical Exam  Vitals reviewed.   Constitutional:       General: She is not in acute distress.     Appearance: Normal appearance. She is normal weight. She is ill-appearing.   HENT:    " "  Head: Normocephalic and atraumatic.      Nose: Congestion present.      Mouth/Throat:      Mouth: Mucous membranes are moist.      Pharynx: Pharyngeal swelling and posterior oropharyngeal erythema present. No oropharyngeal exudate.   Eyes:      Extraocular Movements: Extraocular movements intact.      Pupils: Pupils are equal, round, and reactive to light.   Cardiovascular:      Rate and Rhythm: Normal rate.      Pulses: Normal pulses.   Pulmonary:      Effort: Pulmonary effort is normal.   Abdominal:      General: Abdomen is flat.   Musculoskeletal:         General: Normal range of motion.      Cervical back: Normal range of motion.   Neurological:      Mental Status: She is alert and oriented to person, place, and time. Mental status is at baseline.   Psychiatric:         Mood and Affect: Mood normal.         Behavior: Behavior normal.       Vitals:    01/13/25 1348   BP: 120/86   BP Location: Right arm   Patient Position: Sitting   Pulse: 80   Resp: 20   Temp: 97.5 °F (36.4 °C)   TempSrc: Temporal   SpO2: 98%   Weight: 78.5 kg (173 lb 1 oz)   Height: 5' 4" (1.626 m)      Body mass index is 29.71 kg/m².    Physical Exam                   Assessment & Plan      1. Flu-like symptoms  - SARS-Cov2 (COVID) with FLU/RSV by PCR  - promethazine-dextromethorphan (PROMETHAZINE-DM) 6.25-15 mg/5 mL Syrp; Take 5 mLs by mouth every 4 (four) hours as needed (cough and congestion).  Dispense: 118 mL; Refill: 0       Assessment & Plan    IMPRESSION:  - Assessed patient with suspected influenza based on exposure to family members with confirmed flu and presenting symptoms  - Determined Tamiflu unnecessary given patient's overall health status  - Recommend symptomatic treatment and supportive care    INFLUENZA:  - Assessed the patient's condition, suspecting influenza based on symptoms and exposure to family members with flu.  - Observed slightly enlarged throat due to infection.  - Noted patient woke up with a fever of 102°F and " took ibuprofen.  - Patient reported feeling clammy, sweating but freezing, having a sore throat, and experiencing nausea with vomiting twice in the morning.  - Prescribed Promethazine DM syrup for cough, congestion, and sore throat symptoms, to be taken every 4-6 hours as needed for at least a week, up to 10 days.  - Recommend alternating acetaminophen and ibuprofen for fever and pain management.  - Advised using cough drops for sore throat relief.  - Instructed to increase fluid intake, take vitamin C supplements, and get extra rest.  - Educated the patient on flu symptoms, expected course of illness, and warning signs requiring emergency care, such as dyspnea or wheezing.  - Explained that antibiotics are not indicated for viral infections like influenza.  - Advised to follow up in 24 hours after becoming symptom-free to return to work.  - Instructed to contact the office if dyspnea or wheezing develops.  - Ordered combination test for influenza, COVID-19, and RSV.  - Advised the patient to check [facility] for test results, which may be available within 24-48 hours.      ECTOPIC PREGNANCY:  - Noted patient's history of two ectopic pregnancies.           --------------------------------------------      Health Maintenance:  Health Maintenance         Date Due Completion Date    Lipid Panel Never done ---    TETANUS VACCINE 04/18/2015 4/18/2005    Influenza Vaccine (1) 09/01/2024 11/8/2017    COVID-19 Vaccine (1 - 2024-25 season) Never done ---    Cervical Cancer Screening 06/10/2025 6/10/2024    RSV Vaccine (Age 60+ and Pregnant patients) (1 - 1-dose 75+ series) 01/10/2069 ---            Health maintenance reviewed    Follow Up:  No follow-ups on file.       - The patient is given an After Visit Summary that lists all medications with directions, allergies, education, orders placed during this encounter and follow-up instructions.      - I have reviewed the patient's medical information including past medical,  family, and social history sections including the medications and allergies.      - We discussed the patient's current medications.     This note was generated with the assistance of ambient listening technology. Verbal consent was obtained by the patient and accompanying visitor(s) for the recording of patient appointment to facilitate this note. I attest to having reviewed and edited the generated note for accuracy, though some syntax or spelling errors may persist. Please contact the author of this note for any clarification.      This note was created by combination of typed  and MModal dictation.  Transcription errors may be present.  If there are any questions, please contact me.     Inocencia Whitman PA-C  [facility]

## 2025-01-13 NOTE — LETTER
January 13, 2025      [facility]  [address]  [facility] [address]  Phone: [phone]  Fax: [phone]       Patient: Heydi Goodman   YOB: 1995  Date of Visit: 01/13/2025    To Whom It May Concern:    Brad Goodman  was at [facility] on 01/13/2025. She has been exposed to Influenza A/B and her results are currently pending. The patient may return to work 24 hours after she is symptom free with no restrictions. If you have any questions or concerns, or if I can be of further assistance, please do not hesitate to contact me.    Sincerely,    Inocencia Whitman PA-C

## 2025-01-19 ENCOUNTER — HOSPITAL ENCOUNTER (EMERGENCY)
Facility: HOSPITAL | Age: 30
Discharge: HOME | End: 2025-01-19
Attending: EMERGENCY MEDICINE
Payer: MEDICAID

## 2025-01-19 VITALS
RESPIRATION RATE: 18 BRPM | DIASTOLIC BLOOD PRESSURE: 78 MMHG | HEART RATE: 71 BPM | HEIGHT: 64 IN | OXYGEN SATURATION: 97 % | TEMPERATURE: 98 F | WEIGHT: 173 LBS | SYSTOLIC BLOOD PRESSURE: 135 MMHG | BODY MASS INDEX: 29.53 KG/M2

## 2025-01-19 DIAGNOSIS — R22.0 LEFT FACIAL SWELLING: ICD-10-CM

## 2025-01-19 DIAGNOSIS — R51.9 LEFT FACIAL PAIN: Primary | ICD-10-CM

## 2025-01-19 LAB
ALBUMIN SERPL BCP-MCNC: 4.2 G/DL (ref 3.5–5.2)
ALP SERPL-CCNC: 105 U/L (ref 40–150)
ALT SERPL W/O P-5'-P-CCNC: 30 U/L (ref 10–44)
ANION GAP SERPL CALC-SCNC: 10 MMOL/L (ref 8–16)
AST SERPL-CCNC: 26 U/L (ref 10–40)
B-HCG UR QL: NEGATIVE
BASOPHILS # BLD AUTO: 0.07 K/UL (ref 0–0.2)
BASOPHILS NFR BLD: 0.7 % (ref 0–1.9)
BILIRUB SERPL-MCNC: 0.2 MG/DL (ref 0.1–1)
BILIRUB UR QL STRIP: NEGATIVE
BUN SERPL-MCNC: 11 MG/DL (ref 6–20)
CALCIUM SERPL-MCNC: 9.4 MG/DL (ref 8.7–10.5)
CHLORIDE SERPL-SCNC: 108 MMOL/L (ref 95–110)
CLARITY UR: CLEAR
CO2 SERPL-SCNC: 26 MMOL/L (ref 23–29)
COLOR UR: YELLOW
CREAT SERPL-MCNC: 0.8 MG/DL (ref 0.5–1.4)
CTP QC/QA: YES
DIFFERENTIAL METHOD BLD: ABNORMAL
EOSINOPHIL # BLD AUTO: 0.2 K/UL (ref 0–0.5)
EOSINOPHIL NFR BLD: 1.7 % (ref 0–8)
ERYTHROCYTE [DISTWIDTH] IN BLOOD BY AUTOMATED COUNT: 12.3 % (ref 11.5–14.5)
EST. GFR  (NO RACE VARIABLE): >60 ML/MIN/1.73 M^2
GLUCOSE SERPL-MCNC: 67 MG/DL (ref 70–110)
GLUCOSE UR QL STRIP: NEGATIVE
HCT VFR BLD AUTO: 44 % (ref 37–48.5)
HGB BLD-MCNC: 14.6 G/DL (ref 12–16)
HGB UR QL STRIP: ABNORMAL
IMM GRANULOCYTES # BLD AUTO: 0.06 K/UL (ref 0–0.04)
IMM GRANULOCYTES NFR BLD AUTO: 0.6 % (ref 0–0.5)
KETONES UR QL STRIP: NEGATIVE
LEUKOCYTE ESTERASE UR QL STRIP: NEGATIVE
LYMPHOCYTES # BLD AUTO: 2 K/UL (ref 1–4.8)
LYMPHOCYTES NFR BLD: 19.5 % (ref 18–48)
MCH RBC QN AUTO: 31.9 PG (ref 27–31)
MCHC RBC AUTO-ENTMCNC: 33.2 G/DL (ref 32–36)
MCV RBC AUTO: 96 FL (ref 82–98)
MONOCYTES # BLD AUTO: 0.7 K/UL (ref 0.3–1)
MONOCYTES NFR BLD: 6.2 % (ref 4–15)
NEUTROPHILS # BLD AUTO: 7.4 K/UL (ref 1.8–7.7)
NEUTROPHILS NFR BLD: 71.3 % (ref 38–73)
NITRITE UR QL STRIP: NEGATIVE
NRBC BLD-RTO: 0 /100 WBC
PH UR STRIP: 6 [PH] (ref 5–8)
PLATELET # BLD AUTO: 320 K/UL (ref 150–450)
PMV BLD AUTO: 9.6 FL (ref 9.2–12.9)
POTASSIUM SERPL-SCNC: 3.8 MMOL/L (ref 3.5–5.1)
PROT SERPL-MCNC: 7.4 G/DL (ref 6–8.4)
PROT UR QL STRIP: NEGATIVE
RBC # BLD AUTO: 4.57 M/UL (ref 4–5.4)
SODIUM SERPL-SCNC: 144 MMOL/L (ref 136–145)
SP GR UR STRIP: 1.03 (ref 1–1.03)
URN SPEC COLLECT METH UR: ABNORMAL
UROBILINOGEN UR STRIP-ACNC: ABNORMAL EU/DL
WBC # BLD AUTO: 10.43 K/UL (ref 3.9–12.7)

## 2025-01-19 PROCEDURE — 81003 URINALYSIS AUTO W/O SCOPE: CPT | Performed by: NURSE PRACTITIONER

## 2025-01-19 PROCEDURE — 96374 THER/PROPH/DIAG INJ IV PUSH: CPT

## 2025-01-19 PROCEDURE — 85025 COMPLETE CBC W/AUTO DIFF WBC: CPT | Performed by: NURSE PRACTITIONER

## 2025-01-19 PROCEDURE — 63600175 PHARM REV CODE 636 W HCPCS: Mod: TB | Performed by: NURSE PRACTITIONER

## 2025-01-19 PROCEDURE — 25500020 PHARM REV CODE 255: Performed by: EMERGENCY MEDICINE

## 2025-01-19 PROCEDURE — 80053 COMPREHEN METABOLIC PANEL: CPT | Performed by: NURSE PRACTITIONER

## 2025-01-19 PROCEDURE — 81025 URINE PREGNANCY TEST: CPT | Performed by: EMERGENCY MEDICINE

## 2025-01-19 PROCEDURE — 99285 EMERGENCY DEPT VISIT HI MDM: CPT | Mod: 25

## 2025-01-19 RX ORDER — KETOROLAC TROMETHAMINE 30 MG/ML
15 INJECTION, SOLUTION INTRAMUSCULAR; INTRAVENOUS
Status: COMPLETED | OUTPATIENT
Start: 2025-01-19 | End: 2025-01-19

## 2025-01-19 RX ORDER — HYDROCODONE BITARTRATE AND ACETAMINOPHEN 5; 325 MG/1; MG/1
1 TABLET ORAL EVERY 6 HOURS PRN
Qty: 12 TABLET | Refills: 0 | Status: SHIPPED | OUTPATIENT
Start: 2025-01-19

## 2025-01-19 RX ORDER — AMOXICILLIN AND CLAVULANATE POTASSIUM 875; 125 MG/1; MG/1
1 TABLET, FILM COATED ORAL 2 TIMES DAILY
Qty: 14 TABLET | Refills: 0 | Status: SHIPPED | OUTPATIENT
Start: 2025-01-19

## 2025-01-19 RX ADMIN — IOHEXOL 75 ML: 350 INJECTION, SOLUTION INTRAVENOUS at 12:01

## 2025-01-19 RX ADMIN — KETOROLAC TROMETHAMINE 15 MG: 30 INJECTION, SOLUTION INTRAMUSCULAR; INTRAVENOUS at 11:01

## 2025-01-19 NOTE — DISCHARGE INSTRUCTIONS
Take antibiotics as ordered. You have been given a medication that causes drowsiness.  Do not operate motor vehicles, drink alcohol, or operate heavy machinery while taking this medication. Return to the Emergency Department for any worsening, change in condition, or any emergent concerns.

## 2025-01-19 NOTE — ED PROVIDER NOTES
Encounter Date: 1/19/2025       History     Chief Complaint   Patient presents with    Facial Swelling     Pt to ED c/o swelling noted near L ear. Reports earache x's the last 4 days and woke up with a knot in front of left ear this morning with left sided neck pain. Denies sore throat.      Chief complaint:  Left-sided facial swelling     History of present illness: Patient is a 30-year-old female who states that her left ear has been stopped up for approximately 4 days then today she awoke with left-sided facial swelling and pain that is excruciating in severity.  She reports there is pressure in the ear and it feels like it is popping it radiates to her left eye.   She has taken no medications or treatments for this issue.  She reports having had some sinus congestion and nausea and vomiting during this week.    The history is provided by the patient. No  was used.     Review of patient's allergies indicates:   Allergen Reactions    Ciprofloxacin      Patient stated that when taking this medication she burns and itches and upon getting it via IV she can see the redness travel up her arm.     Past Medical History:   Diagnosis Date    History of ectopic pregnancy 05/2018    History of kidney stones     History of PID 10/21/2013    History of pyelonephritis      Past Surgical History:   Procedure Laterality Date    DIAGNOSTIC LAPAROSCOPY N/A 8/7/2020    Procedure: LAPAROSCOPY, DIAGNOSTIC; ECTOPIC ;  Surgeon: DEON Morris MD;  Location: [facility] OR;  Service: OB/GYN;  Laterality: N/A;    DILATION AND CURETTAGE OF UTERUS  2018    DILATION AND CURETTAGE OF UTERUS N/A 8/7/2020    Procedure: DILATION AND CURETTAGE, UTERUS;  Surgeon: DEON Morris MD;  Location: [facility] OR;  Service: OB/GYN;  Laterality: N/A;    MOUTH SURGERY       Family History   Problem Relation Name Age of Onset    Diabetes Neg Hx      Hypertension Neg Hx      Stroke Neg Hx      Heart disease Neg Hx       Social History  "    Tobacco Use    Smoking status: Former     Current packs/day: 0.25     Average packs/day: 0.3 packs/day for 6.0 years (1.5 ttl pk-yrs)     Types: Cigarettes    Smokeless tobacco: Current   Substance Use Topics    Alcohol use: Yes     Alcohol/week: 10.0 standard drinks of alcohol     Types: 5 Glasses of wine, 5 Shots of liquor per week     Comment: everyday "start with 1/2 pint but sometimes more"    Drug use: No     Review of Systems   HENT:  Positive for sinus pressure. Ear pain: pos for ear pressure.   Gastrointestinal:  Positive for nausea and vomiting.       Physical Exam     Initial Vitals [01/19/25 1034]   BP Pulse Resp Temp SpO2   (!) 154/79 88 16 98.2 °F (36.8 °C) 97 %      MAP       --         Physical Exam    Nursing note and vitals reviewed.  Constitutional: She appears well-developed and well-nourished.   HENT:   Head: Normocephalic and atraumatic.   Right Ear: Hearing, tympanic membrane, external ear and ear canal normal.   Left Ear: Hearing, tympanic membrane, external ear and ear canal normal.   Nose: Nose normal. No mucosal edema or rhinorrhea. No epistaxis. Right sinus exhibits no maxillary sinus tenderness and no frontal sinus tenderness. Left sinus exhibits no maxillary sinus tenderness and no frontal sinus tenderness. Mouth/Throat: Uvula is midline, oropharynx is clear and moist and mucous membranes are normal. No oral lesions. Normal dentition.   Left ear auricle and tragus tenderness to palpation.  Mastoid without bogginess or redness.  Mild left sided facial swelling running along the scm muscle group.   Eyes: Conjunctivae and EOM are normal. Pupils are equal, round, and reactive to light. Right eye exhibits no discharge. Left eye exhibits no discharge.   Neck:   Normal range of motion.  Abdominal: She exhibits no distension.   Musculoskeletal:         General: Normal range of motion.      Cervical back: Normal range of motion.     Neurological: She is alert and oriented to person, place, " and time.   Skin: Skin is dry. Capillary refill takes less than 2 seconds.         ED Course   Procedures  Labs Reviewed   CBC W/ AUTO DIFFERENTIAL - Abnormal       Result Value    WBC 10.43      RBC 4.57      Hemoglobin 14.6      Hematocrit 44.0      MCV 96      MCH 31.9 (*)     MCHC 33.2      RDW 12.3      Platelets 320      MPV 9.6      Immature Granulocytes 0.6 (*)     Gran # (ANC) 7.4      Immature Grans (Abs) 0.06 (*)     Lymph # 2.0      Mono # 0.7      Eos # 0.2      Baso # 0.07      nRBC 0      Gran % 71.3      Lymph % 19.5      Mono % 6.2      Eosinophil % 1.7      Basophil % 0.7      Differential Method Automated     COMPREHENSIVE METABOLIC PANEL - Abnormal    Sodium 144      Potassium 3.8      Chloride 108      CO2 26      Glucose 67 (*)     BUN 11      Creatinine 0.8      Calcium 9.4      Total Protein 7.4      Albumin 4.2      Total Bilirubin 0.2      Alkaline Phosphatase 105      AST 26      ALT 30      eGFR >60      Anion Gap 10     URINALYSIS, REFLEX TO URINE CULTURE - Abnormal    Specimen UA Urine, Clean Catch      Color, UA Yellow      Appearance, UA Clear      pH, UA 6.0      Specific Gravity, UA 1.030      Protein, UA Negative      Glucose, UA Negative      Ketones, UA Negative      Bilirubin (UA) Negative      Occult Blood UA Trace (*)     Nitrite, UA Negative      Urobilinogen, UA 2.0-3.0 (*)     Leukocytes, UA Negative      Narrative:     Specimen Source->Urine   POCT URINE PREGNANCY    POC Preg Test, Ur Negative       Acceptable Yes            Imaging Results              CT Soft Tissue Neck With Contrast (Final result)  Result time 01/19/25 12:53:51      Final result by Yuval Olsen DO (01/19/25 12:53:51)                   Impression:      Unremarkable postcontrast CT neck as detailed above specifically without evidence for focal collection or enhancing lesion.      Electronically signed by: Yuval Olsen DO  Date:    01/19/2025  Time:    12:53               Narrative:     EXAMINATION:  CT SOFT TISSUE NECK WITH CONTRAST    CLINICAL HISTORY:  Neck abscess, deep tissue;    TECHNIQUE:  Low dose axial images as well as sagittal and coronal reconstructions were performed from the skull base to the clavicles following the intravenous administration of 75 mL of Omnipaque 350.    COMPARISON:  None    FINDINGS:  Pharynx/larynx: Nasopharynx, oropharynx, hypopharynx larynx and visualized trachea unremarkable.  No consolidation visualized lungs    Glands: No focal parotid, submandibular or thyroid gland lesion    Allowing for artifact from dental metal no definite focal oral tongue or buccal space lesion    Globes and extraocular muscles relatively symmetric.  No evidence for enhancing mass lesion throughout the neck    Few scattered prominent cervical chain lymph nodes likely reactive.  No focal collection throughout the neck to suggest drainable abscess    No evidence for acute fracture or traumatic subluxation cervical spine.    Mild mucosal thickening right ethmoid air cells anteriorly.  There is trace mucosal thickening measuring approximately 1 mm in thickness left ventral sphenoid sinus remaining paranasal sinuses and mastoid air cells are clear    Major vasculature grossly patent throughout the neck.                                       Medications   ketorolac injection 15 mg (15 mg Intravenous Given 1/19/25 1119)   iohexoL (OMNIPAQUE 350) injection 75 mL (75 mLs Intravenous Given 1/19/25 1222)     Medical Decision Making  Patient is a 30-year-old female who states that her left ear has been stopped up for approximately 4 days then today she awoke with left-sided facial swelling and pain that is excruciating in severity.  She reports there is pressure in the ear and it feels like it is popping it radiates to her left eye.   She has taken no medications or treatments for this issue.  She reports having had some sinus congestion and nausea and vomiting during this week.    Left ear  "auricle and tragus tenderness to palpation.  Mastoid without bogginess or redness.  Mild left sided facial swelling running along the scm muscle group.     Differential diagnosis includes deep space abscess, otitis externa, mastoiditis    Problems Addressed:  Left facial pain: acute illness or injury     Details: CT scan did not reveal the presence of any obvious infection or other abnormalities to the area involved.  I started the patient on Augmentin and Norco for pain.  She understands to follow up with improvement does not occur or if there is worsening.  Drowsy warning provided.    Amount and/or Complexity of Data Reviewed  Labs: ordered. Decision-making details documented in ED Course.  Radiology: ordered. Decision-making details documented in ED Course.  Discussion of management or test interpretation with external provider(s): Vital signs at the time of disposition were:  /78 (BP Location: Right arm, Patient Position: Sitting)   Pulse 71   Temp 98.2 °F (36.8 °C) (Oral)   Resp 18   Ht 5' 4" (1.626 m)   Wt 78.5 kg (173 lb)   LMP 01/03/2025 (Exact Date)   SpO2 97%   Breastfeeding No   BMI 29.70 kg/m²       See AVS for additional recommendations. Medications listed herein were prescribed after reviewing the patient's allergies, medication list, history, most recent laboratories as available.  Referrals below were provided after reviewing the patient's previous medical providers. She understands she  should return for any worsening or changes in condition.  Prior to discharge the patient was asked if she  had any additional concerns or complaints and she declined. The patient was given an opportunity to ask questions and all were answered to her satisfaction.     Risk  Prescription drug management.  Diagnosis or treatment significantly limited by social determinants of health.               ED Course as of 01/19/25 1827   Sun Jan 19, 2025   1147 CBC auto differential(!)  Normal cbc. [VC]   1147 " Comprehensive metabolic panel(!)  Normal cmp. [VC]   1147 hCG Qualitative, Urine: Negative [VC]   1147 BP(!): 154/79 [VC]   1147 Temp: 98.2 °F (36.8 °C) [VC]   1147 Temp Source: Oral [VC]   1147 Pulse: 88 [VC]   1147 Resp: 16 [VC]   1147 SpO2: 97 % [VC]   1215 Urinalysis, Reflex to Urine Culture Urine, Clean Catch(!)  Neg ua [VC]   1256 CT Soft Tissue Neck With Contrast    Unremarkable postcontrast CT neck as detailed above specifically without evidence for focal collection or enhancing lesion.      [VC]      ED Course User Index  [VC] Costa Eckert DNP                           Clinical Impression:  Final diagnoses:  [R51.9] Left facial pain (Primary)  [R22.0] Left facial swelling          ED Disposition Condition    Discharge Stable          ED Prescriptions       Medication Sig Dispense Start Date End Date Auth. Provider    HYDROcodone-acetaminophen (NORCO) 5-325 mg per tablet Take 1 tablet by mouth every 6 (six) hours as needed for Pain. 12 tablet 1/19/2025 -- Costa Eckert DNP    amoxicillin-clavulanate 875-125mg (AUGMENTIN) 875-125 mg per tablet Take 1 tablet by mouth 2 (two) times daily. 14 tablet 1/19/2025 -- Costa Eckert DNP          Follow-up Information       Follow up With Specialties Details Why Contact Info    Hayley Donald MD Family Medicine Schedule an appointment as soon as possible for a visit   [address] [facility]  [address] [place] [id number]  [phone]               Costa Eckert DNP  01/19/25 9398

## 2025-05-05 ENCOUNTER — OFFICE VISIT (OUTPATIENT)
Facility: CLINIC | Age: 30
End: 2025-05-05
Payer: MEDICAID

## 2025-05-05 ENCOUNTER — LAB VISIT (OUTPATIENT)
Dept: LAB | Facility: HOSPITAL | Age: 30
End: 2025-05-05
Payer: MEDICAID

## 2025-05-05 VITALS
DIASTOLIC BLOOD PRESSURE: 72 MMHG | SYSTOLIC BLOOD PRESSURE: 106 MMHG | HEART RATE: 74 BPM | HEIGHT: 64 IN | TEMPERATURE: 98 F | WEIGHT: 175.25 LBS | BODY MASS INDEX: 29.92 KG/M2 | RESPIRATION RATE: 18 BRPM | OXYGEN SATURATION: 97 %

## 2025-05-05 DIAGNOSIS — F10.21 ALCOHOL DEPENDENCE IN REMISSION: ICD-10-CM

## 2025-05-05 DIAGNOSIS — F43.23 ADJUSTMENT REACTION WITH ANXIETY AND DEPRESSION: Primary | ICD-10-CM

## 2025-05-05 DIAGNOSIS — E66.09 CLASS 1 OBESITY DUE TO EXCESS CALORIES WITHOUT SERIOUS COMORBIDITY WITH BODY MASS INDEX (BMI) OF 30.0 TO 30.9 IN ADULT: ICD-10-CM

## 2025-05-05 DIAGNOSIS — Z00.00 ENCOUNTER FOR ANNUAL PHYSICAL EXAM: ICD-10-CM

## 2025-05-05 DIAGNOSIS — R53.83 OTHER FATIGUE: ICD-10-CM

## 2025-05-05 DIAGNOSIS — F50.819 BINGE EATING DISORDER, UNSPECIFIED SEVERITY: ICD-10-CM

## 2025-05-05 DIAGNOSIS — R79.9 ABNORMAL BLOOD CHEMISTRY: ICD-10-CM

## 2025-05-05 DIAGNOSIS — Z13.31 POSITIVE DEPRESSION SCREENING: ICD-10-CM

## 2025-05-05 DIAGNOSIS — E66.811 CLASS 1 OBESITY DUE TO EXCESS CALORIES WITHOUT SERIOUS COMORBIDITY WITH BODY MASS INDEX (BMI) OF 30.0 TO 30.9 IN ADULT: ICD-10-CM

## 2025-05-05 DIAGNOSIS — R51.9 FREQUENT HEADACHES: ICD-10-CM

## 2025-05-05 DIAGNOSIS — F17.200 CURRENT EVERY DAY SMOKER: ICD-10-CM

## 2025-05-05 DIAGNOSIS — F43.23 ADJUSTMENT REACTION WITH ANXIETY AND DEPRESSION: ICD-10-CM

## 2025-05-05 LAB
ABSOLUTE EOSINOPHIL (OHS): 0.22 K/UL
ABSOLUTE MONOCYTE (OHS): 0.65 K/UL (ref 0.3–1)
ABSOLUTE NEUTROPHIL COUNT (OHS): 5.56 K/UL (ref 1.8–7.7)
ALBUMIN SERPL BCP-MCNC: 4 G/DL (ref 3.5–5.2)
ALP SERPL-CCNC: 79 UNIT/L (ref 40–150)
ALT SERPL W/O P-5'-P-CCNC: 14 UNIT/L (ref 10–44)
ANION GAP (OHS): 9 MMOL/L (ref 8–16)
AST SERPL-CCNC: 13 UNIT/L (ref 11–45)
BASOPHILS # BLD AUTO: 0.07 K/UL
BASOPHILS NFR BLD AUTO: 0.8 %
BILIRUB SERPL-MCNC: 0.3 MG/DL (ref 0.1–1)
BUN SERPL-MCNC: 9 MG/DL (ref 6–20)
CALCIUM SERPL-MCNC: 9.1 MG/DL (ref 8.7–10.5)
CHLORIDE SERPL-SCNC: 106 MMOL/L (ref 95–110)
CHOLEST SERPL-MCNC: 183 MG/DL (ref 120–199)
CHOLEST/HDLC SERPL: 3.7 {RATIO} (ref 2–5)
CO2 SERPL-SCNC: 25 MMOL/L (ref 23–29)
CREAT SERPL-MCNC: 0.7 MG/DL (ref 0.5–1.4)
ERYTHROCYTE [DISTWIDTH] IN BLOOD BY AUTOMATED COUNT: 12.2 % (ref 11.5–14.5)
GFR SERPLBLD CREATININE-BSD FMLA CKD-EPI: >60 ML/MIN/1.73/M2
GLUCOSE SERPL-MCNC: 80 MG/DL (ref 70–110)
HCT VFR BLD AUTO: 41.2 % (ref 37–48.5)
HDLC SERPL-MCNC: 49 MG/DL (ref 40–75)
HDLC SERPL: 26.8 % (ref 20–50)
HGB BLD-MCNC: 13.1 GM/DL (ref 12–16)
IMM GRANULOCYTES # BLD AUTO: 0.03 K/UL (ref 0–0.04)
IMM GRANULOCYTES NFR BLD AUTO: 0.3 % (ref 0–0.5)
LDLC SERPL CALC-MCNC: 125 MG/DL (ref 63–159)
LYMPHOCYTES # BLD AUTO: 2.28 K/UL (ref 1–4.8)
MCH RBC QN AUTO: 31.3 PG (ref 27–31)
MCHC RBC AUTO-ENTMCNC: 31.8 G/DL (ref 32–36)
MCV RBC AUTO: 98 FL (ref 82–98)
NONHDLC SERPL-MCNC: 134 MG/DL
NUCLEATED RBC (/100WBC) (OHS): 0 /100 WBC
PLATELET # BLD AUTO: 292 K/UL (ref 150–450)
PMV BLD AUTO: 10.1 FL (ref 9.2–12.9)
POTASSIUM SERPL-SCNC: 3.6 MMOL/L (ref 3.5–5.1)
PROT SERPL-MCNC: 7.2 GM/DL (ref 6–8.4)
RBC # BLD AUTO: 4.19 M/UL (ref 4–5.4)
RELATIVE EOSINOPHIL (OHS): 2.5 %
RELATIVE LYMPHOCYTE (OHS): 25.9 % (ref 18–48)
RELATIVE MONOCYTE (OHS): 7.4 % (ref 4–15)
RELATIVE NEUTROPHIL (OHS): 63.1 % (ref 38–73)
SODIUM SERPL-SCNC: 140 MMOL/L (ref 136–145)
TRIGL SERPL-MCNC: 45 MG/DL (ref 30–150)
TSH SERPL-ACNC: 0.78 UIU/ML (ref 0.4–4)
WBC # BLD AUTO: 8.81 K/UL (ref 3.9–12.7)

## 2025-05-05 PROCEDURE — 80053 COMPREHEN METABOLIC PANEL: CPT

## 2025-05-05 PROCEDURE — 36415 COLL VENOUS BLD VENIPUNCTURE: CPT | Mod: PN

## 2025-05-05 PROCEDURE — 85025 COMPLETE CBC W/AUTO DIFF WBC: CPT

## 2025-05-05 PROCEDURE — 99215 OFFICE O/P EST HI 40 MIN: CPT | Mod: PBBFAC,PN

## 2025-05-05 PROCEDURE — 83036 HEMOGLOBIN GLYCOSYLATED A1C: CPT

## 2025-05-05 PROCEDURE — 82306 VITAMIN D 25 HYDROXY: CPT

## 2025-05-05 PROCEDURE — 84443 ASSAY THYROID STIM HORMONE: CPT

## 2025-05-05 PROCEDURE — 80061 LIPID PANEL: CPT

## 2025-05-05 PROCEDURE — 99999 PR PBB SHADOW E&M-EST. PATIENT-LVL V: CPT | Mod: PBBFAC,,,

## 2025-05-05 RX ORDER — FLUOXETINE 10 MG/1
10 CAPSULE ORAL DAILY
Qty: 30 CAPSULE | Refills: 1 | Status: SHIPPED | OUTPATIENT
Start: 2025-05-05

## 2025-05-05 NOTE — PROGRESS NOTES
"SUBJECTIVE     Chief Complaint   Patient presents with    [facility]  Heydi Goodman is a 30 y.o. female with multiple medical diagnoses as listed in the medical history and problem list that presents to clinic to establish care. Pt is new to me. She has a good appetite and eats well. She does not exercise. She sleeps for ~6 hours nightly. Pt does not take OTC supplements. She does not have any current stressors. Pt is UTD on age appropriate CA screening. Dental exam is UTD. Eye exam is UTD.     [facility] [place]    History of Present Illness    CHIEF COMPLAINT:  Patient presents today with concerns about anxiety and depression    PSYCHIATRIC HISTORY:  She has a history of ADHD diagnosis and underwent multiple childhood mental health evaluations, with possible diagnoses including schizophrenia, though only ADHD was definitively established. She reports mood changes since childhood.    CURRENT MENTAL HEALTH:  She reports severe anxiety symptoms, describing feeling anxious constantly with "bad nerves" and inability to stay still. She experiences depressive episodes, staying in bed for up to three days and lacking motivation for work. She also experiences mood swings cycling between periods of high energy and low mood, lasting up to 2-3 weeks. These symptoms significantly impact her ability to work, maintain household responsibilities, and maintain interpersonal relationships. Patient denies any suicidal ideations.     MEDICATIONS:  She previously took medication for ADHD management. She had a trial of Lexapro which was discontinued due to significant weight gain of 45-50 lbs over three months.    SLEEP:  She averages 6 hours of sleep per night and reports good sleep quality despite experiencing nightmares.    HEADACHES:  She experiences headaches lasting 2-3 days, occurring predominantly on weekends when at home, accompanied by lightheadedness and dizziness.    SOCIAL HISTORY:  She has a 12-year history " of tobacco use and continues to smoke. She has a history of severe alcohol use but reports sobriety since January 11th of this year. She does not attend AA meetings.    PREVENTIVE CARE:  Last dental and eye exams were performed last year. Pap smear in June 2024 was normal.    ALLERGIES:  She has an allergy to Cipro.    SURGICAL HISTORY:  She has had all four wisdom teeth removed.      ROS:  General: -fever, -chills, +fatigue, -weight gain, -weight loss, -excessive drowsiness  Eyes: -vision changes, -redness, -discharge  ENT: -ear pain, -nasal congestion, -sore throat  Cardiovascular: -chest pain, -palpitations, -lower extremity edema  Respiratory: -cough, -shortness of breath  Gastrointestinal: -abdominal pain, -nausea, -vomiting, -diarrhea, -constipation, -blood in stool, -food binging, -appetite changes, -increased appetite, +loss of appetite  Genitourinary: -dysuria, -hematuria, -frequency  Musculoskeletal: -joint pain, -muscle pain  Skin: -rash, -lesion  Neurological: +headache, -dizziness, -numbness, -tingling, -lightheadedness  Psychiatric: +anxiety, +depression, +sleep difficulty, +nervousness, +sleep disturbances, +mood swings         PAST MEDICAL HISTORY:  Past Medical History:   Diagnosis Date    History of ectopic pregnancy 05/2018    History of kidney stones     History of PID 10/21/2013    History of pyelonephritis        PAST SURGICAL HISTORY:  Past Surgical History:   Procedure Laterality Date    DIAGNOSTIC LAPAROSCOPY N/A 8/7/2020    Procedure: LAPAROSCOPY, DIAGNOSTIC; ECTOPIC ;  Surgeon: DEON Morris MD;  Location: [facility];  Service: OB/GYN;  Laterality: N/A;    DILATION AND CURETTAGE OF UTERUS  2018    DILATION AND CURETTAGE OF UTERUS N/A 8/7/2020    Procedure: DILATION AND CURETTAGE, UTERUS;  Surgeon: DEON Morris MD;  Location: [facility];  Service: OB/GYN;  Laterality: N/A;    MOUTH SURGERY         SOCIAL HISTORY:  Social History[1]    FAMILY HISTORY:  Family History   Problem Relation  "Name Age of Onset    Diabetes Neg Hx      Hypertension Neg Hx      Stroke Neg Hx      Heart disease Neg Hx         ALLERGIES AND MEDICATIONS: updated and reviewed.  Review of patient's allergies indicates:   Allergen Reactions    Ciprofloxacin Other (See Comments)     Patient stated that when taking this medication she burns and itches and upon getting it via IV she can see the redness travel up her arm.    Phlebitis and burning w/ IV admin     Current Outpatient Medications   Medication Sig Dispense Refill    FLUoxetine 10 MG capsule Take 1 capsule (10 mg total) by mouth once daily. 30 capsule 1     Current Facility-Administered Medications   Medication Dose Route Frequency Provider Last Rate Last Admin    mening vac A,C,Y,W135 dip (PF) (MENVEO) 10-5 mcg/0.5 mL vaccine (PREFERRED)(10 - 56 YO) 0.5 mL  0.5 mL Intramuscular 1 time in Clinic/HOD            OBJECTIVE     Physical Exam  Vitals:    05/05/25 0831   BP: 106/72   Pulse: 74   Resp: 18   Temp: 97.7 °F (36.5 °C)    Body mass index is 30.08 kg/m².  Weight: 79.5 kg (175 lb 4.3 oz)   Height: 5' 4" (162.6 cm)     Physical Exam  Vitals reviewed.   Constitutional:       General: She is not in acute distress.  HENT:      Right Ear: External ear normal.      Left Ear: External ear normal.      Nose: Nose normal.      Mouth/Throat:      Mouth: Mucous membranes are moist.   Eyes:      Extraocular Movements: Extraocular movements intact.      Conjunctiva/sclera: Conjunctivae normal.      Pupils: Pupils are equal, round, and reactive to light.   Cardiovascular:      Rate and Rhythm: Normal rate and regular rhythm.      Pulses: Normal pulses.      Heart sounds: Normal heart sounds.   Pulmonary:      Effort: Pulmonary effort is normal.      Breath sounds: Normal breath sounds.   Abdominal:      General: Bowel sounds are normal. There is no distension.      Palpations: Abdomen is soft.   Musculoskeletal:         General: No swelling. Normal range of motion.      Cervical " back: Normal range of motion.   Skin:     General: Skin is warm and dry.      Findings: No rash.   Neurological:      General: No focal deficit present.      Mental Status: She is alert and oriented to person, place, and time.   Psychiatric:         Mood and Affect: Mood normal.         Behavior: Behavior normal.         Thought Content: Thought content normal.         Judgment: Judgment normal.       Health Maintenance         Date Due Completion Date    COVID-19 Vaccine (1 - 2024-25 season) Never done ---    Cervical Cancer Screening 06/10/2027 (Originally 6/10/2025) 6/10/2024    Influenza Vaccine (Season Ended) 09/01/2025 11/8/2017    TETANUS VACCINE 05/12/2035 5/12/2025    RSV Vaccine (Age 60+ and Pregnant patients) (1 - 1-dose 75+ series) 01/10/2069 ---          ASSESSMENT     30 y.o. female with     1. Adjustment reaction with anxiety and depression    2. Alcohol dependence in remission    3. Other fatigue    4. Binge eating disorder, unspecified severity    5. Frequent headaches    6. Class 1 obesity due to excess calories without serious comorbidity with body mass index (BMI) of 30.0 to 30.9 in adult    7. Positive depression screening    8. Current every day smoker    9. Abnormal blood chemistry    10. Encounter for annual physical exam        PLAN:     1. Adjustment reaction with anxiety and depression  New. Addressing.   - TSH; Future  - FLUoxetine 10 MG capsule; Take 1 capsule (10 mg total) by mouth once daily.  Dispense: 30 capsule; Refill: 1  - Ambulatory referral/consult to Psychiatry; Future    2. Alcohol dependence in remission  Ongoing. Addressing. Patient reports no alcohol since January 11, 2024.     3. Other fatigue  Ordered labs to check levels. Will treat accordingly once labs are reviewed.    Explained to patient that fatigue, while a very common symptom is usually a very complicated symptom to diagnose due to the high number of differential diagnosis.  Explained how routine blood work can  "often eliminate many of the more common high risk conditions and also explained that an exhaustive work up often still reveals no definitive diagnosis.  Counseled patient that in a lot of cases fatigue is a symptom of over all poor exercise habits, nutrition and sleep/stress.  Counseled that no matter what plan of care shows, we should move forward with increasing exercise (especially aerobic), improving nutrition from processed foods to more natural whole foods prepared at home and to work on sleep hygiene and stress management.  Patient voiced understanding.   - TSH; Future  - Vitamin D; Future    4. Binge eating disorder, unspecified severity  New. Addressing.     5. Frequent headaches  New. Addressing.   Get rest and drink fluids. Get plenty of rest. Counseled on keeping a headache log.   Watch for increase pain, fever, vomiting, neck pain or mental confusion.   You can also use tylenol or ibuprofen as directed as long as you don't have any allergies to these medications or medical conditions such as ulcers, liver or kidney disease or blood thinners etc that would prevent you from taking these meds.      - Hemoglobin A1C; Future  - Comprehensive Metabolic Panel; Future  Red Flag signs include; thunderclap headache, weakness, blurry vision or other sudden sensory deficit, new onset numbness or tingling, shortness of breath, chest pain, nausea, sweating or fatigue. Please go immediately to the Emergency Department with any of these signs.     6. Class 1 obesity due to excess calories without serious comorbidity with body mass index (BMI) of 30.0 to 30.9 in adult  Behavioral counseling for obesity.  History:  - Patient's BMI: Estimated body mass index is 30.08 kg/m² as calculated from the following:    Height as of this encounter: 5' 4" (1.626 m).    Weight as of this encounter: 79.5 kg (175 lb 4.3 oz).  - Patient reports challenges related to weight management.  - Patient meets criteria for obesity counseling: BMI " >= 30 kg/m² or >= 25 kg/m² with associated comorbidities.  - Discussion focused on the benefits of sustained weight loss and its impact on health outcomes.  Plan:  Dietary Guidance: Recommended caloric intake tailored to patient needs, emphasizing nutrient-dense, low-calorie foods. Discussed portion control and meal planning strategies.  Physical Activity: Encouraged moderate physical activity for at least 150 minutes per week, tailored to patients current physical abilities and goals.  Behavioral Strategies: Addressed behaviors contributing to weight gain and set actionable goals, including [food diary, mindful eating, avoiding late-night snacks, etc.]  Motivational Counseling: Discussed the importance of intrinsic and extrinsic motivators in achieving sustained weight management. Reinforced positive behavior changes.  Follow-Up Plan: Patient instructed to return for follow-up for progress evaluation and continued counseling.  Time Spent: A total of 15 minutes was spent in face-to-face behavioral counseling focused on weight loss and obesity management.  Patient Understanding: Patient actively participated in the session and verbalized understanding of the discussed plan.     7. Positive depression screening  - Ambulatory referral/consult to Primary Care Behavioral Health (Non-Opioids); Future    8. Current every day smoker  Patient agreed to smoking cessation program. Discussion time: 3 minutes  - Ambulatory referral/consult to Smoking Cessation Program; Future    9. Abnormal blood chemistry  Ordered labs to check levels. Will treat accordingly once labs are reviewed.    - CBC Auto Differential; Future  - Comprehensive Metabolic Panel; Future    10. Encounter for annual physical exam  - Discussed age and gender appropriate screenings at this visit and encouraged a healthy diet low in simple carbohydrates, and increased physical activity.  Counseled on medically appropriate vaccines based on age and current health  condition.  Screening test reviewed and discussed with patient.    - Hemoglobin A1C; Future  - Lipid Panel; Future  - CBC Auto Differential; Future  - Comprehensive Metabolic Panel; Future  - TSH; Future      Assessment & Plan    IMPRESSION:  - Assessed anxiety using SHU-7 questionnaire, resulting in a score of 18 indicating significant anxiety.  - Considered possibility of bipolar disorder or other mood disorders based on reported symptoms and history.    ATTENTION-DEFICIT HYPERACTIVITY DISORDER:  - Patient reports having ADHD and previously took medication for it.    GENERALIZED ANXIETY DISORDER:  - Patient reports feeling nervous, anxious, or on edge more than half the days in the last 2 weeks, unable to stop worrying every day, trouble relaxing all the time, restless all the time, easily annoyed most of the time, and afraid all the time.  - Evaluated with a standardized questionnaire, resulting in a severe anxiety score of 18.  - Discussed anxiety and its impact on depression, and the need for behavioral health evaluation.    DEPRESSION:  - Patient reports feeling depressed all the time, with periods of severe depression lasting up to 3 weeks.  - Evaluated that the patient experiences manic spirals triggered by anxiety, leading to depression.  - Discussed depression and its impact on patient's life.    BINGE EATING DISORDER:  - Patient reports periods of eating too much and then eating too little, with cravings for sweets during headaches.  - Evaluated that the patient experiences binge eating driven by depression.  - Discussed binge eating and its relation to depression.    HEADACHES:  - Patient reports severe headaches lasting 2-3 days, mostly occurring on weekends, with symptoms of lightheadedness and dizziness.  - Patient denies sensitivity to light or sound, and does not believe headaches are migraines.  - Discussed headaches and the need to track them, with a headache tracker provided.  - Instructed to  track headache occurrences, including potential triggers and duration.  - Recommend using OTC pain relievers like Excedrin Migraine or ibuprofen for headache management.    NICOTINE DEPENDENCE:  - Patient has been smoking cigarettes for 12 years.  - Referred to smoking cessation program.    ALCOHOL DEPENDENCE IN REMISSION:  - Patient has been sober since January 11th of this year and does not attend AA meetings.    FOLLOW-UP AND LABS:  - Initiated Prozac daily for management of anxiety, depression, and binge eating behaviors.  - Provided 2-month supply.  - Explained potential benefits, role of SSRIs in managing mood disorders, their potential side effects, and importance of finding the right medication with potential need for trials of different medications to determine optimal treatment.  - Instructed the patient to contact office if experiencing adverse effects from Prozac.         Rashmi Poon, MSN, APRN, FNP-C  [facility]  05/05/2025 8:57 AM    I spent a total of 35 minutes on the day of the visit.This includes face to face time and non-face to face time preparing to see the patient (eg, review of tests), obtaining and/or reviewing separately obtained history, documenting clinical information in the electronic or other health record, independently interpreting results and communicating results to the patient/family/caregiver, or care coordinator.     This note was generated with the assistance of ambient listening technology. Verbal consent was obtained by the patient and accompanying visitor(s) for the recording of patient appointment to facilitate this note. I attest to having reviewed and edited the generated note for accuracy, though some syntax or spelling errors may persist. Please contact the author of this note for any clarification.    Follow up in about 4 weeks (around 6/2/2025) for Virtual Visit.    I have reviewed the positive depression score which warrants active treatment with  "psychotherapy and/or medications.          [1]   Social History  Socioeconomic History    Marital status: Legally    Tobacco Use    Smoking status: Former     Current packs/day: 0.25     Average packs/day: 0.3 packs/day for 6.0 years (1.5 ttl pk-yrs)     Types: Cigarettes    Smokeless tobacco: Current   Substance and Sexual Activity    Alcohol use: Yes     Alcohol/week: 10.0 standard drinks of alcohol     Types: 5 Glasses of wine, 5 Shots of liquor per week     Comment: everyday "start with 1/2 pint but sometimes more"    Drug use: No    Sexual activity: Yes     Partners: Male     Birth control/protection: None     "

## 2025-05-06 LAB
25(OH)D3+25(OH)D2 SERPL-MCNC: 17 NG/ML (ref 30–96)
EAG (OHS): 100 MG/DL (ref 68–131)
HBA1C MFR BLD: 5.1 % (ref 4–5.6)

## 2025-05-09 ENCOUNTER — TELEPHONE (OUTPATIENT)
Facility: CLINIC | Age: 30
End: 2025-05-09
Payer: MEDICAID

## 2025-05-09 DIAGNOSIS — Z23 IMMUNIZATION DUE: Primary | ICD-10-CM

## 2025-05-09 DIAGNOSIS — Z01.84 IMMUNITY STATUS TESTING: ICD-10-CM

## 2025-05-09 NOTE — TELEPHONE ENCOUNTER
----- Message from Norman Eckert sent at 5/9/2025  2:29 PM CDT -----  Contact: Patient, [phone]    ----- Message -----  From: Rashmi Poon FNP  Sent: 5/9/2025   2:25 PM CDT  To: Tomeka Marlow MA    Contact patient to send us the paperwork.  ----- Message -----  From: Tomeka Marlow MA  Sent: 5/9/2025   1:43 PM CDT  To: MARI Graham    Want me to schedule an appt.?  ----- Message -----  From: Vicki Montalvo  Sent: 5/9/2025  12:42 PM CDT  To: Cleve Caraballo Staff    .[email or web address] Patient is calling for Medical Advice regarding: Calling because she needs vaccines for college./ Please call her. Thanks.How long has patient had these symptoms: N/APharmacy name and phone#: N/APatient wants a call back or thru [facility]: N/AComments: N/APlease advise patient replies from provider may take up to 48 hours.

## 2025-05-12 ENCOUNTER — CLINICAL SUPPORT (OUTPATIENT)
Facility: CLINIC | Age: 30
End: 2025-05-12
Payer: MEDICAID

## 2025-05-12 ENCOUNTER — LAB VISIT (OUTPATIENT)
Dept: LAB | Facility: HOSPITAL | Age: 30
End: 2025-05-12
Payer: MEDICAID

## 2025-05-12 DIAGNOSIS — Z20.2 POSSIBLE EXPOSURE TO STD: ICD-10-CM

## 2025-05-12 DIAGNOSIS — Z01.84 IMMUNITY STATUS TESTING: ICD-10-CM

## 2025-05-12 DIAGNOSIS — N91.4 SECONDARY OLIGOMENORRHEA: ICD-10-CM

## 2025-05-12 DIAGNOSIS — E34.9 DISORDER OF ENDOCRINE SYSTEM: ICD-10-CM

## 2025-05-12 DIAGNOSIS — Z23 IMMUNIZATION DUE: Primary | ICD-10-CM

## 2025-05-12 LAB
EAG (OHS): 97 MG/DL (ref 68–131)
FSH SERPL-ACNC: 2.9 MIU/ML
HBA1C MFR BLD: 5 % (ref 4–5.6)
PROLACTIN SERPL IA-MCNC: 18.2 NG/ML (ref 5.2–26.5)
TSH SERPL-ACNC: 1.27 UIU/ML (ref 0.4–4)

## 2025-05-12 PROCEDURE — 86593 SYPHILIS TEST NON-TREP QUANT: CPT

## 2025-05-12 PROCEDURE — 84443 ASSAY THYROID STIM HORMONE: CPT

## 2025-05-12 PROCEDURE — 83036 HEMOGLOBIN GLYCOSYLATED A1C: CPT

## 2025-05-12 PROCEDURE — 86762 RUBELLA ANTIBODY: CPT

## 2025-05-12 PROCEDURE — 86735 MUMPS ANTIBODY: CPT

## 2025-05-12 PROCEDURE — 99999PBSHW PR PBB SHADOW TECHNICAL ONLY FILED TO HB: Mod: PBBFAC,,,

## 2025-05-12 PROCEDURE — 90715 TDAP VACCINE 7 YRS/> IM: CPT | Mod: PBBFAC,PN

## 2025-05-12 PROCEDURE — 86765 RUBEOLA ANTIBODY: CPT

## 2025-05-12 PROCEDURE — 84146 ASSAY OF PROLACTIN: CPT

## 2025-05-12 PROCEDURE — 90471 IMMUNIZATION ADMIN: CPT | Mod: PBBFAC,PN

## 2025-05-12 PROCEDURE — 87389 HIV-1 AG W/HIV-1&-2 AB AG IA: CPT

## 2025-05-12 PROCEDURE — 36415 COLL VENOUS BLD VENIPUNCTURE: CPT | Mod: PN

## 2025-05-12 PROCEDURE — 83001 ASSAY OF GONADOTROPIN (FSH): CPT

## 2025-05-12 RX ADMIN — CLOSTRIDIUM TETANI TOXOID ANTIGEN (FORMALDEHYDE INACTIVATED), CORYNEBACTERIUM DIPHTHERIAE TOXOID ANTIGEN (FORMALDEHYDE INACTIVATED), BORDETELLA PERTUSSIS TOXOID ANTIGEN (GLUTARALDEHYDE INACTIVATED), BORDETELLA PERTUSSIS FILAMENTOUS HEMAGGLUTININ ANTIGEN (FORMALDEHYDE INACTIVATED), BORDETELLA PERTUSSIS PERTACTIN ANTIGEN, AND BORDETELLA PERTUSSIS FIMBRIAE 2/3 ANTIGEN 0.5 ML: 5; 2; 2.5; 5; 3; 5 INJECTION, SUSPENSION INTRAMUSCULAR at 08:05

## 2025-05-13 LAB
HIV 1+2 AB+HIV1 P24 AG SERPL QL IA: NORMAL
T PALLIDUM IGG+IGM SER QL: NORMAL

## 2025-05-14 LAB
MUMPS IGG (OHS): 22.6 AU/ML
MUMPS IGG INTERPRETATION (OHS): POSITIVE
RUBEOLA (MEASLES) IGG (OHS): 5.8 AU/ML
RUBEOLA IGG INTERP. (OHS): NEGATIVE
RUBV IGG SER-ACNC: 9.2 IU/ML
RUBV IGG SER-IMP: ABNORMAL

## 2025-05-16 ENCOUNTER — RESULTS FOLLOW-UP (OUTPATIENT)
Dept: PRIMARY CARE CLINIC | Facility: CLINIC | Age: 30
End: 2025-05-16

## 2025-05-16 DIAGNOSIS — Z23 IMMUNIZATION DUE: Primary | ICD-10-CM

## 2025-05-26 ENCOUNTER — HOSPITAL ENCOUNTER (EMERGENCY)
Facility: HOSPITAL | Age: 30
Discharge: HOME | End: 2025-05-26
Attending: EMERGENCY MEDICINE
Payer: MEDICAID

## 2025-05-26 VITALS
RESPIRATION RATE: 18 BRPM | SYSTOLIC BLOOD PRESSURE: 101 MMHG | BODY MASS INDEX: 30.04 KG/M2 | TEMPERATURE: 98 F | HEART RATE: 63 BPM | OXYGEN SATURATION: 96 % | DIASTOLIC BLOOD PRESSURE: 57 MMHG | WEIGHT: 175 LBS

## 2025-05-26 DIAGNOSIS — M54.30 SCIATIC LEG PAIN: Primary | ICD-10-CM

## 2025-05-26 DIAGNOSIS — R10.2 PELVIC PAIN: ICD-10-CM

## 2025-05-26 LAB
ABSOLUTE EOSINOPHIL (OHS): 0.21 K/UL
ABSOLUTE MONOCYTE (OHS): 0.41 K/UL (ref 0.3–1)
ABSOLUTE NEUTROPHIL COUNT (OHS): 5.17 K/UL (ref 1.8–7.7)
ALBUMIN SERPL BCP-MCNC: 4.3 G/DL (ref 3.5–5.2)
ALP SERPL-CCNC: 105 UNIT/L (ref 40–150)
ALT SERPL W/O P-5'-P-CCNC: 17 UNIT/L (ref 10–44)
ANION GAP (OHS): 10 MMOL/L (ref 8–16)
AST SERPL-CCNC: 19 UNIT/L (ref 11–45)
B-HCG UR QL: NEGATIVE
BASOPHILS # BLD AUTO: 0.08 K/UL
BASOPHILS NFR BLD AUTO: 1 %
BILIRUB SERPL-MCNC: 0.2 MG/DL (ref 0.1–1)
BILIRUB UR QL STRIP.AUTO: NEGATIVE
BUN SERPL-MCNC: 7 MG/DL (ref 6–20)
CALCIUM SERPL-MCNC: 9.4 MG/DL (ref 8.7–10.5)
CHLORIDE SERPL-SCNC: 111 MMOL/L (ref 95–110)
CLARITY UR: CLEAR
CO2 SERPL-SCNC: 21 MMOL/L (ref 23–29)
COLOR UR AUTO: COLORLESS
CREAT SERPL-MCNC: 0.7 MG/DL (ref 0.5–1.4)
CTP QC/QA: YES
ERYTHROCYTE [DISTWIDTH] IN BLOOD BY AUTOMATED COUNT: 11.9 % (ref 11.5–14.5)
GFR SERPLBLD CREATININE-BSD FMLA CKD-EPI: >60 ML/MIN/1.73/M2
GLUCOSE SERPL-MCNC: 86 MG/DL (ref 70–110)
GLUCOSE UR QL STRIP: NEGATIVE
HCT VFR BLD AUTO: 43.9 % (ref 37–48.5)
HGB BLD-MCNC: 14.6 GM/DL (ref 12–16)
HGB UR QL STRIP: NEGATIVE
HOLD SPECIMEN: NORMAL
IMM GRANULOCYTES # BLD AUTO: 0.05 K/UL (ref 0–0.04)
IMM GRANULOCYTES NFR BLD AUTO: 0.6 % (ref 0–0.5)
KETONES UR QL STRIP: NEGATIVE
LEUKOCYTE ESTERASE UR QL STRIP: NEGATIVE
LYMPHOCYTES # BLD AUTO: 2.18 K/UL (ref 1–4.8)
MCH RBC QN AUTO: 31.7 PG (ref 27–31)
MCHC RBC AUTO-ENTMCNC: 33.3 G/DL (ref 32–36)
MCV RBC AUTO: 95 FL (ref 82–98)
NITRITE UR QL STRIP: NEGATIVE
NUCLEATED RBC (/100WBC) (OHS): 0 /100 WBC
PH UR STRIP: 6 [PH]
PLATELET # BLD AUTO: 353 K/UL (ref 150–450)
PMV BLD AUTO: 9.5 FL (ref 9.2–12.9)
POTASSIUM SERPL-SCNC: 4.3 MMOL/L (ref 3.5–5.1)
PROT SERPL-MCNC: 8 GM/DL (ref 6–8.4)
PROT UR QL STRIP: NEGATIVE
RBC # BLD AUTO: 4.6 M/UL (ref 4–5.4)
RELATIVE EOSINOPHIL (OHS): 2.6 %
RELATIVE LYMPHOCYTE (OHS): 26.9 % (ref 18–48)
RELATIVE MONOCYTE (OHS): 5.1 % (ref 4–15)
RELATIVE NEUTROPHIL (OHS): 63.8 % (ref 38–73)
SODIUM SERPL-SCNC: 142 MMOL/L (ref 136–145)
SP GR UR STRIP: 1
UROBILINOGEN UR STRIP-ACNC: NEGATIVE EU/DL
WBC # BLD AUTO: 8.1 K/UL (ref 3.9–12.7)

## 2025-05-26 PROCEDURE — 96374 THER/PROPH/DIAG INJ IV PUSH: CPT

## 2025-05-26 PROCEDURE — 82040 ASSAY OF SERUM ALBUMIN: CPT

## 2025-05-26 PROCEDURE — 81003 URINALYSIS AUTO W/O SCOPE: CPT

## 2025-05-26 PROCEDURE — 87491 CHLMYD TRACH DNA AMP PROBE: CPT

## 2025-05-26 PROCEDURE — 63600175 PHARM REV CODE 636 W HCPCS: Mod: JZ,TB

## 2025-05-26 PROCEDURE — 99285 EMERGENCY DEPT VISIT HI MDM: CPT | Mod: 25

## 2025-05-26 PROCEDURE — 81025 URINE PREGNANCY TEST: CPT

## 2025-05-26 PROCEDURE — 85025 COMPLETE CBC W/AUTO DIFF WBC: CPT

## 2025-05-26 RX ORDER — LIDOCAINE 50 MG/G
1 PATCH TOPICAL DAILY
Qty: 5 PATCH | Refills: 0 | Status: SHIPPED | OUTPATIENT
Start: 2025-05-26 | End: 2025-05-31

## 2025-05-26 RX ORDER — METHOCARBAMOL 500 MG/1
500 TABLET, FILM COATED ORAL 3 TIMES DAILY
Qty: 15 TABLET | Refills: 0 | Status: SHIPPED | OUTPATIENT
Start: 2025-05-26 | End: 2025-05-31

## 2025-05-26 RX ORDER — MELOXICAM 15 MG/1
15 TABLET ORAL DAILY
Qty: 5 TABLET | Refills: 0 | Status: SHIPPED | OUTPATIENT
Start: 2025-05-26 | End: 2025-05-31

## 2025-05-26 RX ORDER — KETOROLAC TROMETHAMINE 30 MG/ML
15 INJECTION, SOLUTION INTRAMUSCULAR; INTRAVENOUS
Status: COMPLETED | OUTPATIENT
Start: 2025-05-26 | End: 2025-05-26

## 2025-05-26 RX ADMIN — KETOROLAC TROMETHAMINE 15 MG: 30 INJECTION, SOLUTION INTRAMUSCULAR; INTRAVENOUS at 04:05

## 2025-05-26 NOTE — ED TRIAGE NOTES
"Pt BIB EMS reporting Abd pain that started 30 minutes before ems arrival to the home. Per EMS pt reports left lower abd pain and pelvic pain. Pt denies n/v/d, SOB, or CP. Pt reports walking up the stairs and feeling a popping sensation and feeling numbness and tingling radiating down right leg. Pt describes the feeling as "pins and needles" in her right leg.  "

## 2025-05-26 NOTE — Clinical Note
"Heydi Merritt" Rex was seen and treated in our emergency department on 5/26/2025.  She may return to work on 05/28/2025.       If you have any questions or concerns, please don't hesitate to call.      Gemini JAVIER    "

## 2025-05-26 NOTE — ED NOTES
"Pt reports "sparkling tingle" sensation radiating up her right side and right elbow. Magnus MCGOVERN, notified.    "

## 2025-05-26 NOTE — DISCHARGE INSTRUCTIONS
Thank you for coming to our Emergency Department today. It is important to remember that some problems or medical conditions are difficult to diagnose and may not be found or addressed during your Emergency Department visit.  These conditions often start with non-specific symptoms and can only be diagnosed on follow up visits with your primary care physician or specialist when the symptoms continue or change. Please remember that all medical conditions can change, and we cannot predict how you will be feeling tomorrow or the next day. Return to the ER with any questions/concerns, new/concerning symptoms, worsening or failure to improve.       Be sure to follow up with your primary care doctor and review all labs/imaging/tests that were performed during your ER visit with them. It is very common for us to identify non-emergent incidental findings which must be followed up with your primary care physician.  Some labs/imaging/tests may be outside of the normal range, and require non-emergent follow-up and/or further investigation/treatment/procedures/testing to help diagnose/exclude/prevent complications or other potentially serious medical conditions. Some abnormalities may not have been discussed or addressed during your ER visit.     An ER visit does not replace a primary care visit, and many screening tests or follow-up tests cannot be ordered by an ER doctor or performed by the ER. Some tests may even require pre-approval.    If you do not have a primary care doctor, you may contact the one listed on your discharge paperwork or you may also call the [facility] Appointment Desk at [phone] , or [address] [place] [address] at  [phone] to schedule an appointment, or establish care with a primary care doctor or even a specialist and to obtain information about local resources. It is important to your health that you have a primary care doctor.    Please take all medications as directed. We have done our best to select  a medication for you that will treat your condition however, all medications may potentially have side-effects and it is impossible to predict which medications may give you side-effects or what those side-effects (if any) those medications may give you.  If you feel that you are having a negative effect or side-effect of any medication you should stop taking those medications immediately and seek medical attention. If you feel that you are having a life-threatening reaction call 911.        Do not drive, swim, climb to height, take a bath, operate heavy machinery, drink alcohol or take potentially sedating medications, sign any legal documents or make any important decisions for 24 hours if you have received any pain medications, sedatives or mood altering drugs during your ER visit or within 24 hours of taking them if they have been prescribed to you.     You can find additional resources for Dentists, hearing aids, durable medical equipment, low cost pharmacies and other resources at [email or web address]

## 2025-05-26 NOTE — ED PROVIDER NOTES
Encounter Date: 5/26/2025    SCRIBE #1 NOTE: I, Cecilia Vazquez, am scribing for, and in the presence of,  Светлана Agudelo PA-C. I have scribed the following portions of the note - Other sections scribed: HPI, ROS, PE.       History     Chief Complaint   Patient presents with    Abdominal Pain     Pt arrived via ems, pt chief complaint is abdominal pain. Pt began having sudden onset of right lower abd to pelvic are that came on within last 30 min.      Heydi Goodman is a 30 y.o. female with PMHx of ectopic pregnancy, nephrolithiasis, PID, and pyelonephritis who presents to the ED with right lower quadrant abdominal pain that began approximately 30 minutes PTA. Patient reports the pain wraps around from her right lower back into her right lower quadrant abdomen and pelvic area. States the pain feels different from any abdominal/pelvic pain she has experienced in the past. She also reports the back pain began to radiate down her right lower extremity after walking up the stairs and feeling a popping sensation. She reports associated numbness in her RLE, most notable in her right hip, knee and toes. No other exacerbating or alleviating factors. Denies any problems with her LLE. Denies any urinary/bowel incontinence or other associated symptoms. She takes daily Prozac. Reports drug allergy to ciprofloxacin.     The history is provided by the patient. No  was used.     Review of patient's allergies indicates:   Allergen Reactions    Ciprofloxacin Other (See Comments)     Patient stated that when taking this medication she burns and itches and upon getting it via IV she can see the redness travel up her arm.    Phlebitis and burning w/ IV admin     Past Medical History:   Diagnosis Date    History of ectopic pregnancy 05/2018    History of kidney stones     History of PID 10/21/2013    History of pyelonephritis      Past Surgical History:   Procedure Laterality Date    DIAGNOSTIC LAPAROSCOPY  N/A 8/7/2020    Procedure: LAPAROSCOPY, DIAGNOSTIC; ECTOPIC ;  Surgeon: DEON Morris MD;  Location: [facility] OR;  Service: OB/GYN;  Laterality: N/A;    DILATION AND CURETTAGE OF UTERUS  2018    DILATION AND CURETTAGE OF UTERUS N/A 8/7/2020    Procedure: DILATION AND CURETTAGE, UTERUS;  Surgeon: DEON Morris MD;  Location: [facility] OR;  Service: OB/GYN;  Laterality: N/A;    MOUTH SURGERY       Family History   Problem Relation Name Age of Onset    Diabetes Neg Hx      Hypertension Neg Hx      Stroke Neg Hx      Heart disease Neg Hx       Social History[1]  Review of Systems   Constitutional:  Negative for chills and fever.   HENT:  Negative for congestion and sore throat.    Eyes:  Negative for visual disturbance.   Respiratory:  Negative for cough and shortness of breath.    Cardiovascular:  Negative for chest pain.   Gastrointestinal:  Positive for abdominal pain (RLQ). Negative for nausea and vomiting.        (-)Bowel/urinary incontinence    Genitourinary:  Positive for pelvic pain (R). Negative for dysuria, enuresis and vaginal discharge.   Musculoskeletal:  Positive for back pain (R lower back) and myalgias (RLE).   Skin:  Negative for rash.   Neurological:  Positive for numbness (RLE). Negative for headaches.   Psychiatric/Behavioral:  Negative for decreased concentration.        Physical Exam     Initial Vitals [05/26/25 1506]   BP Pulse Resp Temp SpO2   (!) 141/60 80 16 98.4 °F (36.9 °C) 98 %      MAP       --         Physical Exam    Nursing note and vitals reviewed.  Constitutional: She appears well-developed and well-nourished. She is not diaphoretic. No distress.   HENT:   Head: Normocephalic and atraumatic.   Right Ear: External ear normal.   Left Ear: External ear normal.   Eyes: Conjunctivae and EOM are normal.   Neck: No tracheal deviation present. No JVD present.   Normal range of motion.  Cardiovascular:  Normal rate.           Pulmonary/Chest: No stridor. No respiratory distress.   Abdominal:    There is right lower quadrant pelvic pain.    Musculoskeletal:      Cervical back: Normal range of motion.      Comments: Normal lower extremities, bilaterally.      Neurological: She is alert and oriented to person, place, and time.   Skin: No rash noted. No erythema.   Psychiatric: She has a normal mood and affect.         ED Course   Procedures  Labs Reviewed   URINALYSIS, REFLEX TO URINE CULTURE - Abnormal       Result Value    Color, UA Colorless (*)     Appearance, UA Clear      pH, UA 6.0      Spec Grav UA 1.005      Protein, UA Negative      Glucose, UA Negative      Ketones, UA Negative      Bilirubin, UA Negative      Blood, UA Negative      Nitrites, UA Negative      Urobilinogen, UA Negative      Leukocyte Esterase, UA Negative     COMPREHENSIVE METABOLIC PANEL - Abnormal    Sodium 142      Potassium 4.3      Chloride 111 (*)     CO2 21 (*)     Glucose 86      BUN 7      Creatinine 0.7      Calcium 9.4      Protein Total 8.0      Albumin 4.3      Bilirubin Total 0.2            AST 19      ALT 17      Anion Gap 10      eGFR >60     CBC WITH DIFFERENTIAL - Abnormal    WBC 8.10      RBC 4.60      HGB 14.6      HCT 43.9      MCV 95      MCH 31.7 (*)     MCHC 33.3      RDW 11.9      Platelet Count 353      MPV 9.5      Nucleated RBC 0      Neut % 63.8      Lymph % 26.9      Mono % 5.1      Eos % 2.6      Basophil % 1.0      Imm Grans % 0.6 (*)     Neut # 5.17      Lymph # 2.18      Mono # 0.41      Eos # 0.21      Baso # 0.08      Imm Grans # 0.05 (*)    CBC W/ AUTO DIFFERENTIAL    Narrative:     The following orders were created for panel order CBC auto differential.  Procedure                               Abnormality         Status                     ---------                               -----------         ------                     CBC with Differential[[id number]]       Abnormal            Final result                 Please view results for these tests on the individual orders.   GREY TOP  URINE HOLD    Extra Tube Hold for add-ons.     C. TRACHOMATIS/N. GONORRHOEAE BY AMP DNA   POCT URINE PREGNANCY    POC Preg Test, Ur Negative       Acceptable Yes            Imaging Results              US Pelvis Comp with Transvag NON-OB (xpd (Final result)  Result time 05/26/25 18:32:08      Final result by Cecilia Price MD (05/26/25 18:32:08)                   Impression:      No uterine masses.    Small fluid adjacent to the right ovary.  Question possibility of right ovarian cyst rupture.      Electronically signed by: Cecilia Price  Date:    05/26/2025  Time:    18:32               Narrative:    EXAMINATION:  PELVIC ULTRASOUND    CLINICAL HISTORY:  R lower pelvic pain;    TECHNIQUE:  Real-time ultrasound of the pelvis was performed transabdominally and transvaginally.    COMPARISON:  None.    FINDINGS:  The uterus measures 8.4 x 2.8 x 4 cm.  The endometrial stripe measures 6 mm.  There are no uterine masses.    The right ovary measures 4.1 x 2 x 2.7 cm. Small fluid is seen posterior to the right ovary.  The irregularly shaped area of fluid measures 2.1 x 1.0 x 0.8 cm.  Vascular flow is seen in the right ovary.    The left ovary measures 3.3 x 1.6 x 3.5 cm. Vascular flow is seen in the left ovary.    Bowel gas is noted in bilateral adnexa                                       Medications   ketorolac injection 15 mg (15 mg Intravenous Given 5/26/25 1609)     Medical Decision Making  Heydi Goodman is a 30 y.o. female with PMHx of ectopic pregnancy, nephrolithiasis, PID, and pyelonephritis who presents to the ED with right lower quadrant abdominal pain that began approximately 30 minutes PTA. Patient reports the pain wraps around from her right lower back into her right lower quadrant abdomen and pelvic area. States the pain feels different from any abdominal/pelvic pain she has experienced in the past. She also reports the back pain began to radiate down her right lower  extremity after walking up the stairs and feeling a popping sensation. She reports associated numbness in her RLE, most notable in her right hip, knee and toes. No other exacerbating or alleviating factors. Denies any problems with her LLE. Denies any urinary/bowel incontinence or other associated symptoms. She takes daily Prozac. Reports drug allergy to ciprofloxacin.     Differentials include but are not limited to muscle strain, sciatica, appendicitis, ovarian torsion, PID, tubo-ovarian abscess, STI, UTI    Pelvic ultrasound with small amount of free fluid in the adjacent to the right ovary, could represent ruptured ovarian cyst.  Discussed with the patient that I feel that this is where her pelvic pain is coming from.  In addition feel that patient has a muscle strain with a sciatic component.  Patient given Toradol in the emergency department with improvement in her symptoms.  We will discharge with anti-inflammatories, lidocaine patch and muscle relaxers.  All questions and concerns addressed prior to discharge.  Patient given return precautions. I discussed with the patient the diagnosis, treatment plan, indications for return to the emergency department, and for expected follow-up. The patient verbalized an understanding. The patient is asked if there are any questions or concerns. We discuss the case, until all issues are addressed to the patient's satisfaction. Patient understands and is agreeable to the plan.     Amount and/or Complexity of Data Reviewed  Labs: ordered. Decision-making details documented in ED Course.  Radiology: ordered. Decision-making details documented in ED Course.    Risk  Prescription drug management.            Scribe Attestation:   Scribe #1: I performed the above scribed service and the documentation accurately describes the services I performed. I attest to the accuracy of the note.                             I, Светлана Agudelo PA-C, personally performed the services described in  this documentation. All medical record entries made by the scribe were at my direction and in my presence. I have reviewed the chart and agree that the record reflects my personal performance and is accurate and complete.      DISCLAIMER: This note was prepared with [employer] voice recognition transcription software. Garbled syntax, mangled pronouns, and other bizarre constructions may be attributed to that software system.    Clinical Impression:  Final diagnoses:  [M54.30] Sciatic leg pain (Primary)  [R10.2] Pelvic pain          ED Disposition Condition    Discharge Stable          ED Prescriptions       Medication Sig Dispense Start Date End Date Auth. Provider    LIDOcaine (LIDODERM) 5 % Place 1 patch onto the skin once daily. Remove & Discard patch within 12 hours or as directed by MD for 5 days 5 patch 5/26/2025 5/31/2025 Светлана Agudelo PA-C    methocarbamoL (ROBAXIN) 500 MG Tab Take 1 tablet (500 mg total) by mouth 3 (three) times daily. for 5 days 15 tablet 5/26/2025 5/31/2025 Светлана Agudelo PA-C    meloxicam (MOBIC) 15 MG tablet Take 1 tablet (15 mg total) by mouth once daily. for 5 days 5 tablet 5/26/2025 5/31/2025 Светлана Agudelo PA-C          Follow-up Information       Follow up With Specialties Details Why Contact Info    Rosetta Banuelos MD Family Medicine   [address] [place] [address] [id number] [phone]      [facility] Emergency Dept Emergency Medicine Go to  for new or worsening symptoms [address] [facility] [place] [address] [phone]                   [1]   Social History  Tobacco Use    Smoking status: Former     Current packs/day: 0.25     Average packs/day: 0.3 packs/day for 6.0 years (1.5 ttl pk-yrs)     Types: Cigarettes    Smokeless tobacco: Current   Substance Use Topics    Alcohol use: Yes     Alcohol/week: 10.0 standard drinks of alcohol     Types: 5 Glasses of wine, 5 Shots of liquor per week     Comment: everyday  ""start with 1/2 pint but sometimes more"    Drug use: Светлана Jones PA-C  05/26/25 2107    "

## 2025-05-28 ENCOUNTER — DOCUMENTATION ONLY (OUTPATIENT)
Dept: PRIMARY CARE CLINIC | Facility: CLINIC | Age: 30
End: 2025-05-28
Payer: MEDICAID

## 2025-05-28 NOTE — PROGRESS NOTES
Pregnancy test needed to be performed, arrived out of timeframe. Here for Depo Provera Injection,  UPT obtained with Negative results . Patient with no current complaints of pain prior to or after injection. Advised to wait 5 minutes. Return between September 5 - 19 /2018 for next injection.  PATIENT SUPPLIED MEDICATION.  See medication Card for RX information  Order verified, inspected package,storage verified,expiration verified      APPOINTMENT MADE FOR NEXT INJECTION    Site - LD   Detail Level: Detailed General Sunscreen Counseling: I recommended a broad spectrum sunscreen with a SPF of 30 or higher.  I explained that SPF 30 sunscreens block approximately 97 percent of the sun's harmful rays.  Sunscreens should be applied at least 15 minutes prior to expected sun exposure and then every 2 hours after that as long as sun exposure continues. If swimming or exercising sunscreen should be reapplied every 45 minutes to an hour after getting wet or sweating.  One ounce, or the equivalent of a shot glass full of sunscreen, is adequate to protect the skin not covered by a bathing suit. I also recommended a lip balm with a sunscreen as well. Sun protective clothing can be used in lieu of sunscreen but must be worn the entire time you are exposed to the sun's rays.

## 2025-05-29 DIAGNOSIS — F43.23 ADJUSTMENT REACTION WITH ANXIETY AND DEPRESSION: ICD-10-CM

## 2025-05-29 LAB
C TRACH DNA SPEC QL NAA+PROBE: NOT DETECTED
CTGC SOURCE (OHS) ORD-325: NORMAL
N GONORRHOEA DNA UR QL NAA+PROBE: NOT DETECTED

## 2025-05-30 ENCOUNTER — PATIENT MESSAGE (OUTPATIENT)
Facility: CLINIC | Age: 30
End: 2025-05-30
Payer: MEDICAID

## 2025-05-30 ENCOUNTER — TELEPHONE (OUTPATIENT)
Facility: CLINIC | Age: 30
End: 2025-05-30
Payer: MEDICAID

## 2025-05-30 RX ORDER — FLUOXETINE 10 MG/1
10 CAPSULE ORAL DAILY
Qty: 30 CAPSULE | Refills: 1 | Status: SHIPPED | OUTPATIENT
Start: 2025-05-30

## 2025-05-30 NOTE — TELEPHONE ENCOUNTER
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  Extended note to 29th - worked but was sent home due to pain.How long has patient had these symptoms:Pharmacy name and phone#:[facility] back or [facility]: call backComments:Please advise patient replies from provider may take up to 48 hours.

## 2025-05-30 NOTE — LETTER
May 30, 2025      [facility] [place] - Primary Care  [address]  [place] [address]  Phone: [phone]  Fax: [phone]       Patient: Heydi Goodman   YOB: 1995  Date of Visit: 05/30/2025    To Whom It May Concern:    Brad Goodman  was at [facility] on 05/26/2025 The patient is not yet cleared to return to work and must follow up with her primary care provider 6/6/2025 for clearance. If you have any questions or concerns, or if I can be of further assistance, please do not hesitate to contact me.    Sincerely,    Rosetta Banuelos MD  Supervising Physician

## 2025-06-02 ENCOUNTER — OFFICE VISIT (OUTPATIENT)
Dept: PSYCHIATRY | Facility: CLINIC | Age: 30
End: 2025-06-02
Payer: MEDICAID

## 2025-06-02 VITALS
HEIGHT: 64 IN | WEIGHT: 170.88 LBS | DIASTOLIC BLOOD PRESSURE: 63 MMHG | SYSTOLIC BLOOD PRESSURE: 107 MMHG | HEART RATE: 68 BPM | BODY MASS INDEX: 29.17 KG/M2

## 2025-06-02 DIAGNOSIS — F43.10 PTSD (POST-TRAUMATIC STRESS DISORDER): ICD-10-CM

## 2025-06-02 DIAGNOSIS — F60.3 BORDERLINE PERSONALITY DISORDER IN ADULT: ICD-10-CM

## 2025-06-02 DIAGNOSIS — F19.11 SUBSTANCE ABUSE IN REMISSION: ICD-10-CM

## 2025-06-02 DIAGNOSIS — F43.23 ADJUSTMENT REACTION WITH ANXIETY AND DEPRESSION: ICD-10-CM

## 2025-06-02 DIAGNOSIS — F31.81 BIPOLAR 2 DISORDER: ICD-10-CM

## 2025-06-02 DIAGNOSIS — F60.5 OBSESSIVE-COMPULSIVE PERSONALITY DISORDER: ICD-10-CM

## 2025-06-02 DIAGNOSIS — F90.2 ATTENTION DEFICIT HYPERACTIVITY DISORDER (ADHD), COMBINED TYPE: ICD-10-CM

## 2025-06-02 DIAGNOSIS — F39 MILD MOOD DISORDER: Primary | ICD-10-CM

## 2025-06-02 DIAGNOSIS — G47.00 INSOMNIA, UNSPECIFIED TYPE: ICD-10-CM

## 2025-06-02 PROCEDURE — 99999 PR PBB SHADOW E&M-EST. PATIENT-LVL III: CPT | Mod: PBBFAC,SA,HB,

## 2025-06-02 PROCEDURE — 99213 OFFICE O/P EST LOW 20 MIN: CPT | Mod: PBBFAC

## 2025-06-02 RX ORDER — LURASIDONE HYDROCHLORIDE 20 MG/1
20 TABLET, FILM COATED ORAL DAILY
Qty: 30 TABLET | Refills: 2 | Status: SHIPPED | OUTPATIENT
Start: 2025-06-02

## 2025-06-02 RX ORDER — TRAZODONE HYDROCHLORIDE 50 MG/1
TABLET ORAL
Qty: 30 TABLET | Refills: 2 | Status: SHIPPED | OUTPATIENT
Start: 2025-06-02

## 2025-06-06 ENCOUNTER — OFFICE VISIT (OUTPATIENT)
Facility: CLINIC | Age: 30
End: 2025-06-06
Payer: MEDICAID

## 2025-06-06 DIAGNOSIS — F43.23 ADJUSTMENT REACTION WITH ANXIETY AND DEPRESSION: ICD-10-CM

## 2025-06-06 DIAGNOSIS — R53.83 OTHER FATIGUE: Primary | ICD-10-CM

## 2025-06-06 DIAGNOSIS — M54.41 ACUTE RIGHT-SIDED LOW BACK PAIN WITH RIGHT-SIDED SCIATICA: ICD-10-CM

## 2025-06-06 DIAGNOSIS — M25.561 ACUTE PAIN OF RIGHT KNEE: ICD-10-CM

## 2025-06-06 PROCEDURE — 98006 SYNCH AUDIO-VIDEO EST MOD 30: CPT | Mod: 95,,,

## 2025-06-06 PROCEDURE — G2211 COMPLEX E/M VISIT ADD ON: HCPCS | Mod: 95,,,

## 2025-06-06 PROCEDURE — 1159F MED LIST DOCD IN RCRD: CPT | Mod: CPTII,95,,

## 2025-06-06 PROCEDURE — 3044F HG A1C LEVEL LT 7.0%: CPT | Mod: CPTII,95,,

## 2025-06-06 PROCEDURE — 1160F RVW MEDS BY RX/DR IN RCRD: CPT | Mod: CPTII,95,,

## 2025-06-06 RX ORDER — CYCLOBENZAPRINE HCL 5 MG
5 TABLET ORAL NIGHTLY
Qty: 30 TABLET | Refills: 0 | Status: SHIPPED | OUTPATIENT
Start: 2025-06-06 | End: 2025-07-06

## 2025-06-06 RX ORDER — DICLOFENAC SODIUM 75 MG/1
75 TABLET, DELAYED RELEASE ORAL 2 TIMES DAILY
Qty: 60 TABLET | Refills: 0 | Status: SHIPPED | OUTPATIENT
Start: 2025-06-06 | End: 2025-07-06

## 2025-06-06 NOTE — PROGRESS NOTES
The patient's location is:  Patient's Home   The chief complaint leading to consultation is:  Other fatigue [R53.83]    Visit type: audiovisual    Time spent in discussion with the patient: 25  30 minutes of total time spent on the encounter. This includes time spent in discussion with the patient and time preparing for the patient appointment: review of tests, obtaining and/or reviewing separately obtained history, documenting clinical information in the electronic or other health record, independently interpreting results (not separately reported), and communicating results to the patient/family/caregiver or care coordination (not separately reported).     Each patient to whom he or she provides medical services by telemedicine is: (1) informed of the relationship between the physician and patient and the respective role of any other health care provider with respect to management of the patient; and (2) notified that he or she may decline to receive medical services by telemedicine and may withdraw from such care at any time.      Subjective:   Heydi Goodman is a 30 y.o. female who presents for Other fatigue [R53.83].       HPI    History of Present Illness    CHIEF COMPLAINT:  Patient presents today for follow up of right leg and back pain after recent ER visit    BACK AND RIGHT LEG PAIN:  She reports pain localized to the right hip and lower back, specifically above the right buttock, that started after a fall on stairs. The pain is described as shocking and radiates down the right leg to the toes. She experiences constant numbness and tingling in the right leg, which impairs her mobility. She notes that stepping down on the affected foot sends pain up her back. Her work in a restaurant, which involves prolonged standing, lifting heavy objects, and frequent up and down motions, may be contributing to her symptoms.    Pertinent negatives include no abdominal pain, bladder incontinence, bowel  incontinence, chest pain, dysuria, fever, headaches, pelvic pain, perianal numbness, tingling, weakness, or weight loss.     RIGHT KNEE PAIN:  She reports right knee pain localized to the posterior and lateral aspects of the knee, specifically underneath and towards the outside. She describes the pain as severe and shocking in nature. The pain is intermittent, with episodes lasting for several days followed by periods of relief lasting 2-4 days. The pain can onset suddenly, even when lying down to sleep. She experiences increased pain when getting up after sitting with knees bent.    MENTAL HEALTH:  She is undergoing evaluation to identify specific mood disorder, with no indication of bipolar disorder. She reports positive response to Prozac, describing significant improvement in mood and anxiety symptoms. Initially experienced sleep disturbances with Prozac, staying awake until 3-4 AM for the first 3-4 days. Was prescribed Trazodone as needed for sleep support during Prozac initiation. She has also started Latuda.    LABS:  Hemoglobin A1C, lipid panel, CBC, kidney function, liver function, and thyroid function were all normal. Vitamin D levels were low.    MEDICATIONS:  She is taking vitamin D3 5000 IU supplement.     Review of Systems   Constitutional:  Positive for activity change. Negative for unexpected weight change.   HENT:  Negative for hearing loss, rhinorrhea and trouble swallowing.    Eyes:  Negative for discharge and visual disturbance.   Respiratory:  Negative for chest tightness and wheezing.    Cardiovascular:  Negative for chest pain and palpitations.   Gastrointestinal:  Negative for blood in stool, constipation, diarrhea and vomiting.   Endocrine: Negative for polydipsia and polyuria.   Genitourinary:  Negative for difficulty urinating, dysuria, hematuria and menstrual problem.   Musculoskeletal:  Negative for arthralgias, joint swelling and neck pain.   Neurological:  Negative for weakness and  headaches.   Psychiatric/Behavioral:  Negative for confusion and dysphoric mood.          Current Outpatient Medications   Medication Instructions    cyclobenzaprine (FLEXERIL) 5 mg, Oral, Nightly    diclofenac (VOLTAREN) 75 mg, Oral, 2 times daily    FLUoxetine 10 mg, Oral, Daily    lurasidone (LATUDA) 20 mg, Oral, Daily    traZODone (DESYREL) 50 MG tablet Take 0.5 tablet (25 mg) by mouth nightly as needed for insomnia; If insomnia persists, may increase to 1 tablet (50 mg).       Objective:   No home vitals reported.     Physical Exam  Constitutional:       Appearance: Normal appearance.   Pulmonary:      Effort: Pulmonary effort is normal.   Neurological:      General: No focal deficit present.      Mental Status: She is alert and oriented to person, place, and time.   Psychiatric:         Mood and Affect: Mood normal.         Behavior: Behavior normal.           Assessment/Plan:        Diagnoses and all orders for this visit:    Other fatigue    Adjustment reaction with anxiety and depression    Acute pain of right knee  -     X-Ray Knee Complete 4 Or More Views Right; Future  -     diclofenac (VOLTAREN) 75 MG EC tablet; Take 1 tablet (75 mg total) by mouth 2 (two) times daily.    Acute right-sided low back pain with right-sided sciatica  -     X-Ray Lumbar Spine AP And Lateral; Future  -     diclofenac (VOLTAREN) 75 MG EC tablet; Take 1 tablet (75 mg total) by mouth 2 (two) times daily.  -     cyclobenzaprine (FLEXERIL) 5 MG tablet; Take 1 tablet (5 mg total) by mouth nightly.      Assessment & Plan      FALL ON STAIRS:  - Patient reports falling on stairs when leg gave out due to sciatic pain.    LUMBAR RADICULOPATHY AND RIGHT-SIDED SCIATICA:  - Patient reports lower back pain on the right side with radiation down the right leg, accompanied by tingling, numbness, and shooting pain down to the toes.  - Evaluated right-sided lower back and leg pain, considering possible sciatica versus ruptured ovarian cyst  "based on ER visit findings.  - Ordered lumbar spine x-ray to assess for degenerative changes before considering further imaging or specialist referrals.  - If x-rays are inconclusive, will proceed to PT.  - If PT does not provide relief, will request MRI from insurance.    RIGHT KNEE PAIN:  - Patient reports right knee pain, especially when stepping down or sitting with knees bent, describing it as a shocking constant pain.  - Ordered right knee x-ray to assess for degenerative changes before considering further imaging or specialist referrals.  - Instructed patient to obtain and use knee brace for stabilization, especially during work activities.    DEPRESSION:  - Patient reports feeling better with fluoxetine, no longer feeling "super cranked or wired." Evaluated and noted improvement in anxiety and depression.  - Continued current fluoxetine regimen as patient finds it effective.    VITAMIN D DEFICIENCY:  - Noted low vitamin D level from previous tests.  - Continued vitamin D3 supplementation at 125 MCG (5000 IU).    FOLLOW-UP AND ADDITIONAL INFORMATION:  - Assessed recent illness with fatigue, cough, and sore throat - likely viral infection.  - Reviewed recent lab results including hemoglobin A1C, lipid panel, CBC, kidney and liver function tests, and thyroid panel - all within normal limits.  - Follow up in 4 weeks to reassess condition and determine next steps.            MARI Saavedra  [facility]  06/06/2025    This note was generated with the assistance of ambient listening technology. Verbal consent was obtained by the patient and accompanying visitor(s) for the recording of patient appointment to facilitate this note. I attest to having reviewed and edited the generated note for accuracy, though some syntax or spelling errors may persist. Please contact the author of this note for any clarification.The patient location is: [place]      "

## 2025-06-06 NOTE — LETTER
June 6, 2025      [facility] - [facility]  [address]  [place] [address]  Phone: [phone]  Fax: [phone]       Patient: Heydi Goodman   YOB: 1995  Date of Visit: 06/06/2025    To Whom It May Concern:    Brad Goodman  was at [facility] on 06/06/2025. The patient may return to work on 06/10/2025 with restrictions. May need to take extra breaks during work hours. If you have any questions or concerns, or if I can be of further assistance, please do not hesitate to contact me.    Sincerely,       MARI Graham

## 2025-06-16 ENCOUNTER — APPOINTMENT (OUTPATIENT)
Dept: RADIOLOGY | Facility: HOSPITAL | Age: 30
End: 2025-06-16
Payer: MEDICAID

## 2025-06-16 DIAGNOSIS — M25.561 ACUTE PAIN OF RIGHT KNEE: ICD-10-CM

## 2025-06-16 PROCEDURE — 73564 X-RAY EXAM KNEE 4 OR MORE: CPT | Mod: 26,RT,, | Performed by: RADIOLOGY

## 2025-06-16 PROCEDURE — 73564 X-RAY EXAM KNEE 4 OR MORE: CPT | Mod: TC,FY,PN,RT

## 2025-06-19 ENCOUNTER — OFFICE VISIT (OUTPATIENT)
Facility: CLINIC | Age: 30
End: 2025-06-19
Payer: MEDICAID

## 2025-06-19 ENCOUNTER — OFFICE VISIT (OUTPATIENT)
Dept: PSYCHIATRY | Facility: CLINIC | Age: 30
End: 2025-06-19
Payer: MEDICAID

## 2025-06-19 VITALS
SYSTOLIC BLOOD PRESSURE: 95 MMHG | RESPIRATION RATE: 18 BRPM | BODY MASS INDEX: 29.4 KG/M2 | DIASTOLIC BLOOD PRESSURE: 66 MMHG | OXYGEN SATURATION: 97 % | WEIGHT: 172.19 LBS | HEART RATE: 75 BPM | TEMPERATURE: 98 F | HEIGHT: 64 IN

## 2025-06-19 DIAGNOSIS — F41.1 GAD (GENERALIZED ANXIETY DISORDER): ICD-10-CM

## 2025-06-19 DIAGNOSIS — G47.00 INSOMNIA, UNSPECIFIED TYPE: ICD-10-CM

## 2025-06-19 DIAGNOSIS — F43.10 PTSD (POST-TRAUMATIC STRESS DISORDER): ICD-10-CM

## 2025-06-19 DIAGNOSIS — F43.23 ADJUSTMENT REACTION WITH ANXIETY AND DEPRESSION: ICD-10-CM

## 2025-06-19 DIAGNOSIS — M25.50 ARTHRALGIA, UNSPECIFIED JOINT: Primary | ICD-10-CM

## 2025-06-19 DIAGNOSIS — F34.81 DISRUPTIVE MOOD DYSREGULATION DISORDER: Primary | ICD-10-CM

## 2025-06-19 DIAGNOSIS — F90.2 ATTENTION DEFICIT HYPERACTIVITY DISORDER (ADHD), COMBINED TYPE: ICD-10-CM

## 2025-06-19 DIAGNOSIS — F31.81 BIPOLAR 2 DISORDER: ICD-10-CM

## 2025-06-19 PROCEDURE — 99999 PR PBB SHADOW E&M-EST. PATIENT-LVL IV: CPT | Mod: PBBFAC,,, | Performed by: STUDENT IN AN ORGANIZED HEALTH CARE EDUCATION/TRAINING PROGRAM

## 2025-06-19 PROCEDURE — 3008F BODY MASS INDEX DOCD: CPT | Mod: CPTII,,, | Performed by: STUDENT IN AN ORGANIZED HEALTH CARE EDUCATION/TRAINING PROGRAM

## 2025-06-19 PROCEDURE — 3078F DIAST BP <80 MM HG: CPT | Mod: CPTII,,, | Performed by: STUDENT IN AN ORGANIZED HEALTH CARE EDUCATION/TRAINING PROGRAM

## 2025-06-19 PROCEDURE — 99214 OFFICE O/P EST MOD 30 MIN: CPT | Mod: PBBFAC,PN | Performed by: STUDENT IN AN ORGANIZED HEALTH CARE EDUCATION/TRAINING PROGRAM

## 2025-06-19 PROCEDURE — 3044F HG A1C LEVEL LT 7.0%: CPT | Mod: CPTII,,, | Performed by: STUDENT IN AN ORGANIZED HEALTH CARE EDUCATION/TRAINING PROGRAM

## 2025-06-19 PROCEDURE — 1160F RVW MEDS BY RX/DR IN RCRD: CPT | Mod: CPTII,,, | Performed by: STUDENT IN AN ORGANIZED HEALTH CARE EDUCATION/TRAINING PROGRAM

## 2025-06-19 PROCEDURE — 99214 OFFICE O/P EST MOD 30 MIN: CPT | Mod: S$PBB,,, | Performed by: STUDENT IN AN ORGANIZED HEALTH CARE EDUCATION/TRAINING PROGRAM

## 2025-06-19 PROCEDURE — 3074F SYST BP LT 130 MM HG: CPT | Mod: CPTII,,, | Performed by: STUDENT IN AN ORGANIZED HEALTH CARE EDUCATION/TRAINING PROGRAM

## 2025-06-19 PROCEDURE — 1159F MED LIST DOCD IN RCRD: CPT | Mod: CPTII,,, | Performed by: STUDENT IN AN ORGANIZED HEALTH CARE EDUCATION/TRAINING PROGRAM

## 2025-06-19 RX ORDER — LURASIDONE HYDROCHLORIDE 20 MG/1
20 TABLET, FILM COATED ORAL DAILY
Qty: 90 TABLET | Refills: 1 | Status: SHIPPED | OUTPATIENT
Start: 2025-06-19

## 2025-06-19 RX ORDER — TRAZODONE HYDROCHLORIDE 100 MG/1
TABLET ORAL
Qty: 90 TABLET | Refills: 1 | Status: SHIPPED | OUTPATIENT
Start: 2025-06-19

## 2025-06-19 RX ORDER — PREDNISONE 20 MG/1
60 TABLET ORAL DAILY
COMMUNITY

## 2025-06-19 RX ORDER — EPINEPHRINE 0.3 MG/.3ML
INJECTION SUBCUTANEOUS
COMMUNITY

## 2025-06-19 RX ORDER — FLUOXETINE 10 MG/1
10 CAPSULE ORAL DAILY
Qty: 90 CAPSULE | Refills: 1 | Status: SHIPPED | OUTPATIENT
Start: 2025-06-19

## 2025-06-19 RX ORDER — METHYLPHENIDATE HYDROCHLORIDE 5 MG/1
TABLET ORAL
Qty: 60 TABLET | Refills: 0 | Status: SHIPPED | OUTPATIENT
Start: 2025-06-19

## 2025-06-19 NOTE — PROGRESS NOTES
The patient location is: [place]  The chief complaint leading to consultation is:     Visit type: audiovisual    Face to Face time with patient: 20  30 minutes of total time spent on the encounter, which includes face to face time and non-face to face time preparing to see the patient (eg, review of tests), Obtaining and/or reviewing separately obtained history, Documenting clinical information in the electronic or other health record, Independently interpreting results (not separately reported) and communicating results to the patient/family/caregiver, or Care coordination (not separately reported).         Each patient to whom he or she provides medical services by telemedicine is:  (1) informed of the relationship between the physician and patient and the respective role of any other health care provider with respect to management of the patient; and (2) notified that he or she may decline to receive medical services by telemedicine and may withdraw from such care at any time.    Notes:             Outpatient Psychiatry Follow-Up Visit (MD/NP)    6/19/2025    Clinical Status of Patient:  Outpatient (Ambulatory)    Chief Complaint:  Heydi Goodman is a 30 y.o. female who presents today for follow-up of mood disorder, anxiety, and insomnia.  Met with patient.      Plan of care previous visit:   Started Latuda 20 mg tablet, take 1 tablet by mouth daily at night with a meal (at least 350 calories).  - Continued Prozac 10 mg daily as previously prescribed to target anxiety and depression.  - Started trazodone 50 mg tablet, take half tablet (25 mg) by mouth nightly as needed for insomnia. If insomnia persists, may increase to 1 full tablet (50 mg).    Interval History and Content of Current Session:  Interim Events/Subjective Report/Content of Current Session:   Reports mood has been a little better since starting latuda (less snappy and irritated). She denies depression and anxiety; Reports trazodone  "was not helpful for insomnia (25-50 mg). Reports appetite as "good". She complains of inattention and distraction affecting her work. Nakul she has been diagnosed with ADHD since she was a child and took medications until teenage years when she had difficulty with securing care. Currently reports symptoms including difficulty sustaining attention, distracted, difficulty following through, dislikes tasks that require sustained attention, forgetful during activities, difficulty managing sequential tasks, restless, uncomfortable being still for an extended amount of time, leaves situations where remaining is expected, feels as if she's driven by a motor, difficulty waiting her turn. She has trialed both amphetamines and methylphenidate medications when she wasz young. Reports they made her feel "zombified" and she is apprehensive of these symptoms returning.     Psychotherapy:  Target symptoms: distractability  Why chosen therapy is appropriate versus another modality: relevant to diagnosis  Outcome monitoring methods: self-report  Therapeutic intervention type: insight oriented psychotherapy, supportive psychotherapy  Topics discussed/themes: building skills sets for symptom management, symptom recognition  The patient's response to the intervention is accepting. The patient's progress toward treatment goals is good.   Duration of intervention: 6 minutes.    Review of Systems   PSYCHIATRIC: Pertinant items are noted in the narrative.    Past Medical, Family and Social History: The patient's past medical, family and social history have been reviewed and updated as appropriate within the electronic medical record - see encounter notes.    Compliance: yes    Side effects: None    Risk Parameters:  Patient reports no suicidal ideation  Patient reports no homicidal ideation  Patient reports no self-injurious behavior  Patient reports no violent behavior    Exam (detailed: at least 9 elements; comprehensive: all 15 " "elements)   Constitutional  Vitals:  Most recent vital signs, dated less than 90 days prior to this appointment, were reviewed.   There were no vitals filed for this visit.     General:  unremarkable, age appropriate, casually dressed, neatly groomed     Musculoskeletal  Muscle Strength/Tone:  not examined   Gait & Station:  Not examined     Psychiatric  Speech:  no latency; no press, spontaneous   Mood & Affect:  anxious  mood-congruent   Thought Process:  normal and logical, goal-directed   Associations:  intact   Thought Content:  normal, no suicidality, no homicidality, delusions, or paranoia   Insight:  intact, has awareness of illness   Judgement: behavior is adequate to circumstances   Orientation:  grossly intact   Memory: intact for content of interview   Language: grossly intact   Attention Span & Concentration:  able to focus   Fund of Knowledge:  intact and appropriate to age and level of education     Assessment and Diagnosis   Status/Progress: Based on the examination today, the patient's problem(s) is/are inadequately controlled.  New problems have not been presented today.   Co-morbidities are not complicating management of the primary condition.  There are no active rule-out diagnoses for this patient at this time.     Assessment & Plan    IMPRESSION:  - Assessed for mood disorder, anxiety, ADHD.  - Mood slightly improved on latuda "less snappy"  - DX with ADHD as a child and took medications until teen years when she stopped due to feeling "zombiefied". Currently symptoms of ADHD are affecting her job  - Insomnia persists with 25-50 mg trazodone    PATIENT EDUCATION:    -Discussed diagnosis, risks and benefits of proposed treatment above vs alternative treatments vs no treatment, and potential side effects of these treatments, and the inherent unpredictability of individual response to treatment.  The patient expresses understanding and gives informed consent to pursue treatment.  The potential " benefits outweigh the potential risks. Patient has no other questions. Risks/adverse effects discussed at this time including but not limited to: GI side effects, sexual dysfunction, activation vs sedation, triggering of suicidal thoughts, and serotonin syndrome.      - Stimulants: discussed and educated the patient on risks of abuse, misuse, and habit forming risks with stimulant treatment. Informed patient that misuse of this drug may cause heart-related side effects or even sudden death. The patient was also informed of possible adverse effects including, but not limited to: headaches, increased HR and blood pressure, decreased appetite and weight loss, increased anxiety/nervousness, jitteriness, insomnia, dry mouth, agitation, exacerbation of romeo, and in rare cases psychosis. Patient made aware that an EKG will be ordered prior to administration with any history of cardiac disease. Patient advised not to mix the medication with alcohol. The patient expressed understanding. Alternatives to this medication were also discussed with the patient, including risks of not taking medications, and the patient was agreeable to the above choice.     1. Disruptive mood dysregulation disorder        2. SHU (generalized anxiety disorder)        3. Attention deficit hyperactivity disorder (ADHD), combined type  methylphenidate HCl (RITALIN) 5 MG tablet      4. Insomnia, unspecified type  traZODone (DESYREL) 100 MG tablet      5. PTSD (post-traumatic stress disorder)       Visit today included increased complexity associated with the care of the episodic problem Disruptive mood disorder, SHU, ADHD, PTSD, insomnia addressed and managing the longitudinal care of the patient due to the serious and/or complex managed problem(s).     Intervention/Counseling/Treatment Plan   - Medication Management: Continue current medications. The risks and benefits of medication were discussed with the patient.  - Labs, Diagnostic Studies:  reviewed most recent    MEDICATIONS:  - Continued Latuda 20 mg tablet, take 1 tablet by mouth daily at night with a meal (at least 350 calories).  - Continued Prozac 10 mg daily as previously prescribed to target anxiety and depression.  - Increase trazodone 50 mg tablet, take 2 tablets (100 mg) by mouth nightly as needed for insomnia.   - Start methylphenidate 5 mg tablet; take 1 tablet 5 mg after breakfast and 1 tablet 5 mg after lunch for a total daily dose of 10 mg.     FOLLOW UP:  - Follow up in 2 weeks.     Safety:   - Patient has been educated on safety plan should any SI/HI, medication side effects &/or emergency arise. Patient verbalized understanding and agreement to contact a trusted friend or loved one, call 911 or go to nearest ER should any emergency occur.    JAMIR HusseinP

## 2025-06-19 NOTE — PROGRESS NOTES
"SUBJECTIVE     History of Present Illness    CHIEF COMPLAINT:  Patient presents today for follow-up.    MUSCULOSKELETAL:  She reports current absence of back and knee pain after taking prednisone for an allergic reaction. Her right knee is consistently swollen, even when not experiencing pain, and she describes current swelling as the "most normal it's been". She works 12 hours daily on her feet and has visible venous changes. Recent X-ray showed normal results. She has significant family history of knee problems, with all three aunts having double knee replacements, grandmother having one knee replacement, and mother having poor knee condition. She expresses concern about potential autoimmune conditions such as rheumatoid arthritis or lupus due to family history. She denies current pain but acknowledges anticipation of pain recurrence once prednisone effects subside.    ALLERGIC REACTION:  She experienced an allergic reaction to mangoes that prompted hospital visit two days prior. She was administered prednisone 40 mg, Benadryl, and additional medications in the emergency department. She describes experiencing significant improvement in overall well-being following medication administration, stating she felt "like Superwoman" the day after treatment.    ADHD:  She reports ongoing challenges with ADHD symptoms, specifically noting difficulties with focus, concentration, and motivation. She describes persistent struggles with maintaining engagement, highlighting that during previous virtual schooling attempts, she became disinterested after only three weeks. She acknowledges her upcoming academic pursuits and expresses awareness of her potential difficulties with sustained attention and motivation. She currently feels her mood is stable but remains concerned about her ability to maintain consistent concentration and drive to complete tasks. She is interested in discussing potential ADHD medication management to " "address these ongoing cognitive and motivational challenges.    CURRENT MEDICATIONS:  She is currently taking Latuda 40 mg as a mood stabilizer. She reports uncertainty about the medication's specific purpose.      ROS:  General: -fever, -chills, -fatigue, -weight gain, -weight loss  Eyes: -vision changes, -redness, -discharge  ENT: -ear pain, -nasal congestion, -sore throat  Cardiovascular: -chest pain, -palpitations, -lower extremity edema  Respiratory: -cough, -shortness of breath  Gastrointestinal: -abdominal pain, -nausea, -vomiting, -diarrhea, -constipation, -blood in stool  Genitourinary: -dysuria, -hematuria, -frequency  Musculoskeletal: -joint pain, -muscle pain, +joint swelling, +limb swelling  Skin: -rash, -lesion  Neurological: -headache, -dizziness, -numbness, -tingling  Psychiatric: -anxiety, -depression, -sleep difficulty, +lack of focus/concentration  Allergic: +allergic reactions           PAST MEDICAL HISTORY:  Past Medical History:   Diagnosis Date    History of ectopic pregnancy 05/2018    History of kidney stones     History of PID 10/21/2013    History of pyelonephritis        ALLERGIES AND MEDICATIONS: updated and reviewed.  Review of patient's allergies indicates:   Allergen Reactions    Ciprofloxacin Other (See Comments)     Patient stated that when taking this medication she burns and itches and upon getting it via IV she can see the redness travel up her arm.    Phlebitis and burning w/ IV admin    [place] flavor Hives     Current Medications[1]      OBJECTIVE     Physical Exam  Vitals:    06/19/25 0911   BP: 95/66   Pulse: 75   Resp: 18   Temp: 97.8 °F (36.6 °C)    Body mass index is 29.55 kg/m².  Weight: 78.1 kg (172 lb 2.9 oz)   Height: 5' 4" (162.6 cm)     Physical Exam  Vitals reviewed.   Constitutional:       General: She is not in acute distress.  HENT:      Right Ear: External ear normal.      Left Ear: External ear normal.      Nose: Nose normal.      Mouth/Throat:      Mouth: " Mucous membranes are moist.   Eyes:      Extraocular Movements: Extraocular movements intact.      Conjunctiva/sclera: Conjunctivae normal.      Pupils: Pupils are equal, round, and reactive to light.   Pulmonary:      Effort: Pulmonary effort is normal.   Abdominal:      General: There is no distension.      Palpations: Abdomen is soft.   Musculoskeletal:         General: No swelling. Normal range of motion.      Cervical back: Normal range of motion.   Skin:     General: Skin is warm and dry.      Findings: No rash.   Neurological:      General: No focal deficit present.      Mental Status: She is alert and oriented to person, place, and time.   Psychiatric:         Mood and Affect: Mood normal.         Behavior: Behavior normal.           Health Maintenance         Date Due Completion Date    Pneumococcal Vaccines (Age 0-49) (1 of 2 - PCV) Never done ---    COVID-19 Vaccine (1 - 2024-25 season) Never done ---    Cervical Cancer Screening 06/10/2027 (Originally 6/10/2025) 6/10/2024    Influenza Vaccine (Season Ended) 09/01/2025 11/8/2017    TETANUS VACCINE 05/12/2035 5/12/2025    RSV Vaccine (Age 60+ and Pregnant patients) (1 - 1-dose 75+ series) 01/10/2069 ---            Assessment & Plan    M25.461 Effusion, right knee  F90.9 Attention-deficit hyperactivity disorder, unspecified type  L50.0 Allergic urticaria  I87.2 Venous insufficiency (chronic) (peripheral)  Z82.69 Family history of other diseases of the musculoskeletal system and connective tissue  Z79.52 Long term (current) use of systemic steroids    IMPRESSION:  Considered potential autoimmune etiology (e.g., rheumatoid arthritis, lupus) for persistent knee swelling and pain, given family history of knee issues.  Noted recent normal XR results, acknowledging limitations in detecting autoimmune conditions.  Reviewed past lab results from 2011 showing slightly positive rheumatology markers.  Deferred management of Latuda (mood stabilizer) and potential ADHD  medication to Ms. Patel.    RIGHT KNEE EFFUSION:  - Patient reports persistent knee swelling even when pain-free.  - X-ray of the knee appeared completely normal.  - Discussed potential autoimmune causes for knee effusion, such as rheumatoid arthritis or lupus, given family history.  - Ordered rheumatology labs to be drawn when pain returns, noting that recent prednisone use may suppress results.  - CBC Auto Differential; Future  - C-Reactive Protein; Future  - Rheumatoid Factor; Future  - Sedimentation rate; Future  - Cyclic Citrullinated Peptide Antibody, IgG; Future  - DIA Screen w/Reflex; Future    ATTENTION-DEFICIT HYPERACTIVITY DISORDER:  - Patient reports issues with focus, concentration, and motivation, especially when starting school.  - Discussed starting ADHD medication and advised patient to consult with Ms. Liliane Patel regarding medication options.    ALLERGIC URTICARIA:  - Patient experienced allergic reaction to mangoes and was treated with prednisone, famotidine, and diphenhydramine.  - Instructed to complete the prescribed prednisone course.    VENOUS INSUFFICIENCY:  - Patient reports having small veins protruding in multiple areas.    FAMILY HISTORY OF MUSCULOSKELETAL DISEASES:  - Noted patient's family history of double knee replacements on maternal side, including aunts and grandmother.    LONG-TERM STEROID USE:  - Instructed patient to complete the prescribed prednisone course for allergic reaction.             Rosetta Banuelos MD  06/19/2025 9:15 AM        Follow up in about 3 months (around 9/19/2025).    This note was generated with the assistance of ambient listening technology. Verbal consent was obtained by the patient and accompanying visitor(s) for the recording of patient appointment to facilitate this note. I attest to having reviewed and edited the generated note for accuracy, though some syntax or spelling errors may persist. Please contact the author of this note for any  clarification.                     [1]   Current Outpatient Medications   Medication Sig Dispense Refill    cyclobenzaprine (FLEXERIL) 5 MG tablet Take 1 tablet (5 mg total) by mouth nightly. 30 tablet 0    EPINEPHrine (EPIPEN) 0.3 mg/0.3 mL AtIn Inject into the muscle.      FLUoxetine 10 MG capsule Take 1 capsule (10 mg total) by mouth once daily. 30 capsule 1    lurasidone (LATUDA) 20 mg Tab tablet Take 1 tablet (20 mg total) by mouth once daily. 30 tablet 2    predniSONE (DELTASONE) 20 MG tablet Take 60 mg by mouth once daily.      traZODone (DESYREL) 50 MG tablet Take 0.5 tablet (25 mg) by mouth nightly as needed for insomnia; If insomnia persists, may increase to 1 tablet (50 mg). 30 tablet 2    diclofenac (VOLTAREN) 75 MG EC tablet Take 1 tablet (75 mg total) by mouth 2 (two) times daily. (Patient not taking: Reported on 6/19/2025) 60 tablet 0     Current Facility-Administered Medications   Medication Dose Route Frequency Provider Last Rate Last Admin    mening vac A,C,Y,W135 dip (PF) (MENVEO) 10-5 mcg/0.5 mL vaccine (PREFERRED)(10 - 56 YO) 0.5 mL  0.5 mL Intramuscular 1 time in Clinic/HOD

## 2025-07-05 ENCOUNTER — RESULTS FOLLOW-UP (OUTPATIENT)
Dept: PRIMARY CARE CLINIC | Facility: CLINIC | Age: 30
End: 2025-07-05

## 2025-07-22 DIAGNOSIS — F90.2 ATTENTION DEFICIT HYPERACTIVITY DISORDER (ADHD), COMBINED TYPE: ICD-10-CM

## 2025-07-22 RX ORDER — METHYLPHENIDATE HYDROCHLORIDE 5 MG/1
TABLET ORAL
Qty: 60 TABLET | Refills: 0 | Status: SHIPPED | OUTPATIENT
Start: 2025-07-22

## 2025-08-21 DIAGNOSIS — F43.23 ADJUSTMENT REACTION WITH ANXIETY AND DEPRESSION: ICD-10-CM

## 2025-08-21 DIAGNOSIS — F90.2 ATTENTION DEFICIT HYPERACTIVITY DISORDER (ADHD), COMBINED TYPE: ICD-10-CM

## 2025-08-25 RX ORDER — METHYLPHENIDATE HYDROCHLORIDE 5 MG/1
TABLET ORAL
Qty: 60 TABLET | Refills: 0 | Status: SHIPPED | OUTPATIENT
Start: 2025-08-25

## 2025-08-25 RX ORDER — FLUOXETINE 10 MG/1
10 CAPSULE ORAL DAILY
Qty: 90 CAPSULE | Refills: 1 | Status: SHIPPED | OUTPATIENT
Start: 2025-08-25

## 2025-08-26 ENCOUNTER — PATIENT MESSAGE (OUTPATIENT)
Facility: CLINIC | Age: 30
End: 2025-08-26
Payer: MEDICAID

## (undated) DEVICE — BAG TISS RETRV MONARCH 10MM

## (undated) DEVICE — UNDERGLOVES BIOGEL PI SIZE 8

## (undated) DEVICE — SEE MEDLINE ITEM 152622

## (undated) DEVICE — KIT WING PAD POSITIONING

## (undated) DEVICE — TROCAR ENDOPATH XCEL 5X100MM

## (undated) DEVICE — ADHESIVE DERMABOND ADVANCED

## (undated) DEVICE — GLOVE BIOGEL SKINSENSE PI 7.5

## (undated) DEVICE — SEE MEDLINE ITEM 157110

## (undated) DEVICE — SOL 9P NACL IRR PIC IL

## (undated) DEVICE — SYR 10CC LUER LOCK

## (undated) DEVICE — KIT PROCEDURE STER INLET CLOSU

## (undated) DEVICE — IRRIGATOR ENDOSCOPY DISP.

## (undated) DEVICE — CANNULA ENDOPATH XCEL 5X100MM

## (undated) DEVICE — PAD ABD 8X10 STERILE

## (undated) DEVICE — SEALER LIGASURE LAP 37CM 5MM

## (undated) DEVICE — VACURETTE 7MM CURVED

## (undated) DEVICE — PACK LAPAROSCOPY BAPTIST

## (undated) DEVICE — GLOVE BIOGEL SKINSENSE PI 8.0

## (undated) DEVICE — SUT MCRYL PLUS 4-0 PS2 27IN

## (undated) DEVICE — NDL INSUF ULTRA VERESS 120MM

## (undated) DEVICE — PENCIL ELECTROSURG HOLST W/BLD

## (undated) DEVICE — SOL CLEARIFY VISUALIZATION LAP